# Patient Record
Sex: FEMALE | Race: WHITE | NOT HISPANIC OR LATINO | Employment: UNEMPLOYED
[De-identification: names, ages, dates, MRNs, and addresses within clinical notes are randomized per-mention and may not be internally consistent; named-entity substitution may affect disease eponyms.]

---

## 2017-10-12 ENCOUNTER — RECORDS - HEALTHEAST (OUTPATIENT)
Dept: ADMINISTRATIVE | Facility: OTHER | Age: 57
End: 2017-10-12

## 2018-01-16 ENCOUNTER — RECORDS - HEALTHEAST (OUTPATIENT)
Dept: ADMINISTRATIVE | Facility: OTHER | Age: 58
End: 2018-01-16

## 2019-01-11 ENCOUNTER — RECORDS - HEALTHEAST (OUTPATIENT)
Dept: ADMINISTRATIVE | Facility: OTHER | Age: 59
End: 2019-01-11

## 2019-06-27 ENCOUNTER — OFFICE VISIT - HEALTHEAST (OUTPATIENT)
Dept: FAMILY MEDICINE | Facility: CLINIC | Age: 59
End: 2019-06-27

## 2019-06-27 DIAGNOSIS — R05.9 COUGH: ICD-10-CM

## 2019-06-27 DIAGNOSIS — J01.90 ACUTE NON-RECURRENT SINUSITIS, UNSPECIFIED LOCATION: ICD-10-CM

## 2019-06-27 RX ORDER — GABAPENTIN 300 MG/1
300 CAPSULE ORAL
Status: SHIPPED | COMMUNITY
Start: 2019-03-14 | End: 2021-10-20

## 2019-06-27 RX ORDER — ALBUTEROL SULFATE 90 UG/1
AEROSOL, METERED RESPIRATORY (INHALATION)
Status: SHIPPED | COMMUNITY
Start: 2019-03-14 | End: 2022-10-07

## 2019-07-03 ENCOUNTER — OFFICE VISIT - HEALTHEAST (OUTPATIENT)
Dept: FAMILY MEDICINE | Facility: CLINIC | Age: 59
End: 2019-07-03

## 2019-07-03 DIAGNOSIS — Z91.09 ENVIRONMENTAL ALLERGIES: ICD-10-CM

## 2019-07-03 DIAGNOSIS — G25.81 RESTLESS LEG SYNDROME: ICD-10-CM

## 2019-07-03 DIAGNOSIS — F32.A ANXIETY AND DEPRESSION: ICD-10-CM

## 2019-07-03 DIAGNOSIS — Z76.89 ENCOUNTER TO ESTABLISH CARE: ICD-10-CM

## 2019-07-03 DIAGNOSIS — K29.50 CHRONIC GASTRITIS WITHOUT BLEEDING, UNSPECIFIED GASTRITIS TYPE: ICD-10-CM

## 2019-07-03 DIAGNOSIS — D68.62 LUPUS ANTICOAGULANT DISORDER (H): ICD-10-CM

## 2019-07-03 DIAGNOSIS — F41.9 ANXIETY AND DEPRESSION: ICD-10-CM

## 2019-07-03 ASSESSMENT — MIFFLIN-ST. JEOR: SCORE: 1164.17

## 2019-07-08 ENCOUNTER — COMMUNICATION - HEALTHEAST (OUTPATIENT)
Dept: ADMINISTRATIVE | Facility: HOSPITAL | Age: 59
End: 2019-07-08

## 2019-07-08 ENCOUNTER — COMMUNICATION - HEALTHEAST (OUTPATIENT)
Dept: ONCOLOGY | Facility: CLINIC | Age: 59
End: 2019-07-08

## 2019-07-26 ENCOUNTER — OFFICE VISIT - HEALTHEAST (OUTPATIENT)
Dept: ONCOLOGY | Facility: CLINIC | Age: 59
End: 2019-07-26

## 2019-07-26 ENCOUNTER — OFFICE VISIT - HEALTHEAST (OUTPATIENT)
Dept: ALLERGY | Facility: CLINIC | Age: 59
End: 2019-07-26

## 2019-07-26 DIAGNOSIS — J30.1 SEASONAL ALLERGIC RHINITIS DUE TO POLLEN: ICD-10-CM

## 2019-07-26 DIAGNOSIS — I82.402 DEEP VEIN THROMBOSIS (DVT) OF LEFT LOWER EXTREMITY, UNSPECIFIED CHRONICITY, UNSPECIFIED VEIN (H): ICD-10-CM

## 2019-07-26 DIAGNOSIS — L29.9 ITCHING: ICD-10-CM

## 2019-07-26 DIAGNOSIS — J30.81 ALLERGIC RHINITIS DUE TO DOG HAIR: ICD-10-CM

## 2019-07-26 DIAGNOSIS — R76.0 LUPUS ANTICOAGULANT POSITIVE: ICD-10-CM

## 2019-07-26 ASSESSMENT — MIFFLIN-ST. JEOR
SCORE: 1161.9
SCORE: 1159.63

## 2019-07-30 ENCOUNTER — COMMUNICATION - HEALTHEAST (OUTPATIENT)
Dept: ALLERGY | Facility: CLINIC | Age: 59
End: 2019-07-30

## 2019-07-30 LAB
A ALTERNATA IGE QN: <0.35 KU/L
A FUMIGATUS IGE QN: <0.35 KU/L
C HERBARUM IGE QN: <0.35 KU/L
CAT DANDER IGG QN: <0.35 KU/L
COCKSFOOT IGE QN: <0.35 KU/L
COMMON RAGWEED IGE QN: <0.35 KU/L
COTTONWOOD IGE QN: <0.35 KU/L
D FARINAE IGE QN: <0.35 KU/L
D PTERONYSS IGE QN: <0.35 KU/L
DOG DANDER+EPITH IGE QN: 2.64 KU/L
KENT BLUE GRASS IGE QN: <0.35 KU/L
MAPLE IGE QN: <0.35 KU/L
ROACH IGE QN: <0.35 KU/L
SILVER BIRCH IGE QN: <0.35 KU/L
TIMOTHY IGE QN: <0.35 KU/L
TOTAL IGE - HISTORICAL: 221 KU/L (ref 0–100)
WHITE ASH IGE QN: <0.35 KU/L
WHITE ELM IGE QN: <0.35 KU/L
WHITE OAK IGE QN: <0.35 KU/L

## 2019-08-02 ENCOUNTER — COMMUNICATION - HEALTHEAST (OUTPATIENT)
Dept: ALLERGY | Facility: CLINIC | Age: 59
End: 2019-08-02

## 2019-08-02 ENCOUNTER — COMMUNICATION - HEALTHEAST (OUTPATIENT)
Dept: ONCOLOGY | Facility: CLINIC | Age: 59
End: 2019-08-02

## 2019-08-02 DIAGNOSIS — I82.402 DEEP VEIN THROMBOSIS (DVT) OF LEFT LOWER EXTREMITY, UNSPECIFIED CHRONICITY, UNSPECIFIED VEIN (H): ICD-10-CM

## 2019-08-11 ENCOUNTER — OFFICE VISIT - HEALTHEAST (OUTPATIENT)
Dept: FAMILY MEDICINE | Facility: CLINIC | Age: 59
End: 2019-08-11

## 2019-08-11 DIAGNOSIS — N30.00 ACUTE CYSTITIS WITHOUT HEMATURIA: ICD-10-CM

## 2019-08-11 DIAGNOSIS — R39.9 UTI SYMPTOMS: ICD-10-CM

## 2019-08-11 LAB
ALBUMIN UR-MCNC: NEGATIVE MG/DL
APPEARANCE UR: ABNORMAL
BACTERIA #/AREA URNS HPF: ABNORMAL HPF
BILIRUB UR QL STRIP: NEGATIVE
COLOR UR AUTO: YELLOW
GLUCOSE UR STRIP-MCNC: NEGATIVE MG/DL
HGB UR QL STRIP: ABNORMAL
KETONES UR STRIP-MCNC: NEGATIVE MG/DL
LEUKOCYTE ESTERASE UR QL STRIP: ABNORMAL
NITRATE UR QL: NEGATIVE
PH UR STRIP: 6.5 [PH] (ref 5–8)
RBC #/AREA URNS AUTO: ABNORMAL HPF
SP GR UR STRIP: 1.02 (ref 1–1.03)
SQUAMOUS #/AREA URNS AUTO: ABNORMAL LPF
UROBILINOGEN UR STRIP-ACNC: ABNORMAL
WBC #/AREA URNS AUTO: ABNORMAL HPF

## 2019-08-13 ENCOUNTER — COMMUNICATION - HEALTHEAST (OUTPATIENT)
Dept: ALLERGY | Facility: CLINIC | Age: 59
End: 2019-08-13

## 2019-08-13 ENCOUNTER — COMMUNICATION - HEALTHEAST (OUTPATIENT)
Dept: FAMILY MEDICINE | Facility: CLINIC | Age: 59
End: 2019-08-13

## 2019-08-13 DIAGNOSIS — N30.00 ACUTE CYSTITIS WITHOUT HEMATURIA: ICD-10-CM

## 2019-08-13 LAB — BACTERIA SPEC CULT: ABNORMAL

## 2019-08-27 ENCOUNTER — COMMUNICATION - HEALTHEAST (OUTPATIENT)
Dept: ALLERGY | Facility: CLINIC | Age: 59
End: 2019-08-27

## 2019-08-30 ENCOUNTER — OFFICE VISIT - HEALTHEAST (OUTPATIENT)
Dept: FAMILY MEDICINE | Facility: CLINIC | Age: 59
End: 2019-08-30

## 2019-09-02 ENCOUNTER — AMBULATORY - HEALTHEAST (OUTPATIENT)
Dept: FAMILY MEDICINE | Facility: CLINIC | Age: 59
End: 2019-09-02

## 2019-09-02 DIAGNOSIS — N39.0 URINARY TRACT INFECTION WITHOUT HEMATURIA, SITE UNSPECIFIED: ICD-10-CM

## 2019-09-03 ENCOUNTER — AMBULATORY - HEALTHEAST (OUTPATIENT)
Dept: LAB | Facility: CLINIC | Age: 59
End: 2019-09-03

## 2019-09-03 DIAGNOSIS — N39.0 URINARY TRACT INFECTION WITHOUT HEMATURIA, SITE UNSPECIFIED: ICD-10-CM

## 2019-09-03 LAB
ALBUMIN UR-MCNC: NEGATIVE MG/DL
APPEARANCE UR: CLEAR
BACTERIA #/AREA URNS HPF: ABNORMAL HPF
BILIRUB UR QL STRIP: NEGATIVE
COLOR UR AUTO: YELLOW
GLUCOSE UR STRIP-MCNC: NEGATIVE MG/DL
HGB UR QL STRIP: NEGATIVE
KETONES UR STRIP-MCNC: NEGATIVE MG/DL
LEUKOCYTE ESTERASE UR QL STRIP: ABNORMAL
NITRATE UR QL: NEGATIVE
PH UR STRIP: 6.5 [PH] (ref 5–8)
RBC #/AREA URNS AUTO: ABNORMAL HPF
SP GR UR STRIP: 1.01 (ref 1–1.03)
SQUAMOUS #/AREA URNS AUTO: ABNORMAL LPF
UROBILINOGEN UR STRIP-ACNC: ABNORMAL
WBC #/AREA URNS AUTO: ABNORMAL HPF

## 2019-09-04 ENCOUNTER — HOSPITAL ENCOUNTER (OUTPATIENT)
Dept: ULTRASOUND IMAGING | Facility: CLINIC | Age: 59
Setting detail: RADIATION/ONCOLOGY SERIES
Discharge: STILL ADMITTED OR ADMINISTRATIVE | End: 2019-09-04
Attending: INTERNAL MEDICINE

## 2019-09-04 ENCOUNTER — AMBULATORY - HEALTHEAST (OUTPATIENT)
Dept: INFUSION THERAPY | Facility: CLINIC | Age: 59
End: 2019-09-04

## 2019-09-04 DIAGNOSIS — I82.402 DEEP VEIN THROMBOSIS (DVT) OF LEFT LOWER EXTREMITY, UNSPECIFIED CHRONICITY, UNSPECIFIED VEIN (H): ICD-10-CM

## 2019-09-04 DIAGNOSIS — R76.0 LUPUS ANTICOAGULANT POSITIVE: ICD-10-CM

## 2019-09-04 LAB
BACTERIA SPEC CULT: NO GROWTH
BASOPHILS # BLD AUTO: 0.1 THOU/UL (ref 0–0.2)
BASOPHILS NFR BLD AUTO: 1 % (ref 0–2)
D DIMER PPP FEU-MCNC: 0.32 FEU UG/ML
EOSINOPHIL # BLD AUTO: 0.3 THOU/UL (ref 0–0.4)
EOSINOPHIL NFR BLD AUTO: 2 % (ref 0–6)
ERYTHROCYTE [DISTWIDTH] IN BLOOD BY AUTOMATED COUNT: 13.5 % (ref 11–14.5)
HCT VFR BLD AUTO: 47.7 % (ref 35–47)
HGB BLD-MCNC: 15.5 G/DL (ref 12–16)
LYMPHOCYTES # BLD AUTO: 2.9 THOU/UL (ref 0.8–4.4)
LYMPHOCYTES NFR BLD AUTO: 28 % (ref 20–40)
MCH RBC QN AUTO: 33.1 PG (ref 27–34)
MCHC RBC AUTO-ENTMCNC: 32.5 G/DL (ref 32–36)
MCV RBC AUTO: 102 FL (ref 80–100)
MONOCYTES # BLD AUTO: 0.5 THOU/UL (ref 0–0.9)
MONOCYTES NFR BLD AUTO: 5 % (ref 2–10)
NEUTROPHILS # BLD AUTO: 6.6 THOU/UL (ref 2–7.7)
NEUTROPHILS NFR BLD AUTO: 64 % (ref 50–70)
PLATELET # BLD AUTO: 218 THOU/UL (ref 140–440)
PMV BLD AUTO: 11.9 FL (ref 8.5–12.5)
RBC # BLD AUTO: 4.68 MILL/UL (ref 3.8–5.4)
WBC: 10.3 THOU/UL (ref 4–11)

## 2019-09-06 LAB
CARDIOLIPIN IGG SER IA-ACNC: <1.6 GPL-U/ML (ref 0–19.9)
CARDIOLIPIN IGM SER IA-ACNC: 0.4 MPL-U/ML (ref 0–19.9)

## 2019-09-09 LAB — LA PPP-IMP: POSITIVE

## 2019-09-10 ENCOUNTER — OFFICE VISIT - HEALTHEAST (OUTPATIENT)
Dept: ONCOLOGY | Facility: CLINIC | Age: 59
End: 2019-09-10

## 2019-09-10 DIAGNOSIS — K22.2 ESOPHAGEAL STRICTURE: ICD-10-CM

## 2019-09-10 DIAGNOSIS — D68.62 LUPUS ANTICOAGULANT DISORDER (H): ICD-10-CM

## 2019-09-10 DIAGNOSIS — R14.0 BLOATING: ICD-10-CM

## 2019-09-10 DIAGNOSIS — Z71.6 ENCOUNTER FOR TOBACCO USE CESSATION COUNSELING: ICD-10-CM

## 2019-09-10 DIAGNOSIS — I82.402 DEEP VEIN THROMBOSIS (DVT) OF LEFT LOWER EXTREMITY, UNSPECIFIED CHRONICITY, UNSPECIFIED VEIN (H): ICD-10-CM

## 2019-09-13 ENCOUNTER — COMMUNICATION - HEALTHEAST (OUTPATIENT)
Dept: ONCOLOGY | Facility: CLINIC | Age: 59
End: 2019-09-13

## 2019-09-13 ENCOUNTER — RECORDS - HEALTHEAST (OUTPATIENT)
Dept: ADMINISTRATIVE | Facility: OTHER | Age: 59
End: 2019-09-13

## 2019-10-15 ENCOUNTER — RECORDS - HEALTHEAST (OUTPATIENT)
Dept: ADMINISTRATIVE | Facility: OTHER | Age: 59
End: 2019-10-15

## 2019-11-04 ENCOUNTER — COMMUNICATION - HEALTHEAST (OUTPATIENT)
Dept: FAMILY MEDICINE | Facility: CLINIC | Age: 59
End: 2019-11-04

## 2019-11-18 ENCOUNTER — OFFICE VISIT - HEALTHEAST (OUTPATIENT)
Dept: FAMILY MEDICINE | Facility: CLINIC | Age: 59
End: 2019-11-18

## 2019-11-18 ENCOUNTER — RECORDS - HEALTHEAST (OUTPATIENT)
Dept: GENERAL RADIOLOGY | Facility: CLINIC | Age: 59
End: 2019-11-18

## 2019-11-18 DIAGNOSIS — Z23 NEED FOR TETANUS, DIPHTHERIA, AND ACELLULAR PERTUSSIS (TDAP) VACCINE IN PATIENT OF ADOLESCENT AGE OR OLDER: ICD-10-CM

## 2019-11-18 DIAGNOSIS — M54.2 CERVICALGIA: ICD-10-CM

## 2019-11-18 DIAGNOSIS — Z71.84 TRAVEL ADVICE ENCOUNTER: ICD-10-CM

## 2019-11-18 DIAGNOSIS — Z12.31 VISIT FOR SCREENING MAMMOGRAM: ICD-10-CM

## 2019-11-18 DIAGNOSIS — M54.2 CERVICAL PAIN (NECK): ICD-10-CM

## 2019-11-21 ENCOUNTER — AMBULATORY - HEALTHEAST (OUTPATIENT)
Dept: FAMILY MEDICINE | Facility: CLINIC | Age: 59
End: 2019-11-21

## 2019-11-21 DIAGNOSIS — M54.2 CERVICAL PAIN (NECK): ICD-10-CM

## 2019-11-27 ENCOUNTER — HOSPITAL ENCOUNTER (OUTPATIENT)
Dept: PHYSICAL MEDICINE AND REHAB | Facility: CLINIC | Age: 59
Discharge: HOME | End: 2019-11-27
Attending: FAMILY MEDICINE

## 2019-11-27 DIAGNOSIS — Z98.890 HISTORY OF MANDIBULAR SURGERY: ICD-10-CM

## 2019-11-27 DIAGNOSIS — G89.29 CHRONIC JAW PAIN: ICD-10-CM

## 2019-11-27 DIAGNOSIS — M54.50 BILATERAL LOW BACK PAIN WITHOUT SCIATICA, UNSPECIFIED CHRONICITY: ICD-10-CM

## 2019-11-27 DIAGNOSIS — M54.12 RADICULITIS OF RIGHT CERVICAL REGION: ICD-10-CM

## 2019-11-27 DIAGNOSIS — G89.29 CHRONIC NECK PAIN: ICD-10-CM

## 2019-11-27 DIAGNOSIS — R68.84 CHRONIC JAW PAIN: ICD-10-CM

## 2019-11-27 DIAGNOSIS — M54.2 CHRONIC NECK PAIN: ICD-10-CM

## 2019-11-27 DIAGNOSIS — R26.89 BALANCE PROBLEMS: ICD-10-CM

## 2019-11-27 DIAGNOSIS — M43.12 SPONDYLOLISTHESIS OF CERVICAL REGION: ICD-10-CM

## 2019-11-27 ASSESSMENT — MIFFLIN-ST. JEOR: SCORE: 1173.24

## 2019-11-29 ENCOUNTER — OFFICE VISIT - HEALTHEAST (OUTPATIENT)
Dept: FAMILY MEDICINE | Facility: CLINIC | Age: 59
End: 2019-11-29

## 2019-11-29 DIAGNOSIS — R10.2 VAGINAL PAIN: ICD-10-CM

## 2019-11-29 DIAGNOSIS — R42 VERTIGO: ICD-10-CM

## 2019-12-02 ENCOUNTER — RECORDS - HEALTHEAST (OUTPATIENT)
Dept: ADMINISTRATIVE | Facility: OTHER | Age: 59
End: 2019-12-02

## 2019-12-02 ENCOUNTER — HOSPITAL ENCOUNTER (OUTPATIENT)
Dept: RADIOLOGY | Facility: CLINIC | Age: 59
Discharge: HOME | End: 2019-12-02

## 2019-12-02 ENCOUNTER — HOSPITAL ENCOUNTER (OUTPATIENT)
Dept: CT IMAGING | Facility: CLINIC | Age: 59
Discharge: HOME | End: 2019-12-02
Attending: FAMILY MEDICINE

## 2019-12-02 ENCOUNTER — HOSPITAL ENCOUNTER (OUTPATIENT)
Dept: MRI IMAGING | Facility: CLINIC | Age: 59
Discharge: HOME | End: 2019-12-02

## 2019-12-02 DIAGNOSIS — Z98.890 HISTORY OF MANDIBULAR SURGERY: ICD-10-CM

## 2019-12-02 DIAGNOSIS — R68.84 CHRONIC JAW PAIN: ICD-10-CM

## 2019-12-02 DIAGNOSIS — R26.89 BALANCE PROBLEMS: ICD-10-CM

## 2019-12-02 DIAGNOSIS — M43.12 SPONDYLOLISTHESIS OF CERVICAL REGION: ICD-10-CM

## 2019-12-02 DIAGNOSIS — G89.29 CHRONIC NECK PAIN: ICD-10-CM

## 2019-12-02 DIAGNOSIS — R42 VERTIGO: ICD-10-CM

## 2019-12-02 DIAGNOSIS — G89.29 CHRONIC JAW PAIN: ICD-10-CM

## 2019-12-02 DIAGNOSIS — M54.12 RADICULITIS OF RIGHT CERVICAL REGION: ICD-10-CM

## 2019-12-02 DIAGNOSIS — M54.50 BILATERAL LOW BACK PAIN WITHOUT SCIATICA, UNSPECIFIED CHRONICITY: ICD-10-CM

## 2019-12-02 DIAGNOSIS — M54.2 CHRONIC NECK PAIN: ICD-10-CM

## 2019-12-04 ENCOUNTER — COMMUNICATION - HEALTHEAST (OUTPATIENT)
Dept: PHYSICAL MEDICINE AND REHAB | Facility: CLINIC | Age: 59
End: 2019-12-04

## 2019-12-04 ENCOUNTER — AMBULATORY - HEALTHEAST (OUTPATIENT)
Dept: PHYSICAL MEDICINE AND REHAB | Facility: CLINIC | Age: 59
End: 2019-12-04

## 2019-12-04 DIAGNOSIS — G89.29 CHRONIC JAW PAIN: ICD-10-CM

## 2019-12-04 DIAGNOSIS — M54.2 CHRONIC NECK PAIN: ICD-10-CM

## 2019-12-04 DIAGNOSIS — M54.12 RADICULITIS OF RIGHT CERVICAL REGION: ICD-10-CM

## 2019-12-04 DIAGNOSIS — M48.02 FORAMINAL STENOSIS OF CERVICAL REGION: ICD-10-CM

## 2019-12-04 DIAGNOSIS — G89.29 CHRONIC NECK PAIN: ICD-10-CM

## 2019-12-04 DIAGNOSIS — R68.84 CHRONIC JAW PAIN: ICD-10-CM

## 2019-12-04 DIAGNOSIS — M54.50 BILATERAL LOW BACK PAIN WITHOUT SCIATICA, UNSPECIFIED CHRONICITY: ICD-10-CM

## 2019-12-04 DIAGNOSIS — M43.12 SPONDYLOLISTHESIS OF CERVICAL REGION: ICD-10-CM

## 2019-12-04 DIAGNOSIS — R26.89 BALANCE PROBLEMS: ICD-10-CM

## 2019-12-04 DIAGNOSIS — Z98.890 HISTORY OF MANDIBULAR SURGERY: ICD-10-CM

## 2019-12-09 ENCOUNTER — RECORDS - HEALTHEAST (OUTPATIENT)
Dept: ADMINISTRATIVE | Facility: OTHER | Age: 59
End: 2019-12-09

## 2019-12-13 ENCOUNTER — OFFICE VISIT - HEALTHEAST (OUTPATIENT)
Dept: PHYSICAL THERAPY | Facility: REHABILITATION | Age: 59
End: 2019-12-13

## 2019-12-13 DIAGNOSIS — M26.623 BILATERAL TEMPOROMANDIBULAR JOINT PAIN: ICD-10-CM

## 2019-12-13 DIAGNOSIS — G89.29 CHRONIC BILATERAL LOW BACK PAIN WITHOUT SCIATICA: ICD-10-CM

## 2019-12-13 DIAGNOSIS — M54.50 CHRONIC BILATERAL LOW BACK PAIN WITHOUT SCIATICA: ICD-10-CM

## 2019-12-13 DIAGNOSIS — M54.2 CERVICAL PAIN: ICD-10-CM

## 2019-12-13 DIAGNOSIS — M62.81 GENERALIZED MUSCLE WEAKNESS: ICD-10-CM

## 2019-12-16 ENCOUNTER — OFFICE VISIT - HEALTHEAST (OUTPATIENT)
Dept: PHYSICAL THERAPY | Facility: REHABILITATION | Age: 59
End: 2019-12-16

## 2019-12-16 DIAGNOSIS — M54.2 CERVICAL PAIN: ICD-10-CM

## 2019-12-16 DIAGNOSIS — M54.50 CHRONIC BILATERAL LOW BACK PAIN WITHOUT SCIATICA: ICD-10-CM

## 2019-12-16 DIAGNOSIS — M26.623 BILATERAL TEMPOROMANDIBULAR JOINT PAIN: ICD-10-CM

## 2019-12-16 DIAGNOSIS — G89.29 CHRONIC BILATERAL LOW BACK PAIN WITHOUT SCIATICA: ICD-10-CM

## 2019-12-16 DIAGNOSIS — M62.81 GENERALIZED MUSCLE WEAKNESS: ICD-10-CM

## 2019-12-17 ENCOUNTER — HOSPITAL ENCOUNTER (OUTPATIENT)
Dept: CT IMAGING | Facility: CLINIC | Age: 59
Discharge: HOME | End: 2019-12-17
Attending: INTERNAL MEDICINE

## 2019-12-17 DIAGNOSIS — K59.00 CONSTIPATION: ICD-10-CM

## 2019-12-17 LAB
CREAT BLD-MCNC: 0.9 MG/DL (ref 0.6–1.1)
GFR SERPL CREATININE-BSD FRML MDRD: >60 ML/MIN/1.73M2

## 2019-12-18 ENCOUNTER — OFFICE VISIT - HEALTHEAST (OUTPATIENT)
Dept: PHYSICAL THERAPY | Facility: REHABILITATION | Age: 59
End: 2019-12-18

## 2019-12-18 DIAGNOSIS — M26.623 BILATERAL TEMPOROMANDIBULAR JOINT PAIN: ICD-10-CM

## 2019-12-18 DIAGNOSIS — M54.2 CERVICAL PAIN: ICD-10-CM

## 2019-12-18 DIAGNOSIS — M62.81 GENERALIZED MUSCLE WEAKNESS: ICD-10-CM

## 2019-12-18 DIAGNOSIS — G89.29 CHRONIC BILATERAL LOW BACK PAIN WITHOUT SCIATICA: ICD-10-CM

## 2019-12-18 DIAGNOSIS — M54.50 CHRONIC BILATERAL LOW BACK PAIN WITHOUT SCIATICA: ICD-10-CM

## 2020-01-13 ENCOUNTER — OFFICE VISIT - HEALTHEAST (OUTPATIENT)
Dept: PHYSICAL THERAPY | Facility: REHABILITATION | Age: 60
End: 2020-01-13

## 2020-01-13 DIAGNOSIS — M54.50 CHRONIC BILATERAL LOW BACK PAIN WITHOUT SCIATICA: ICD-10-CM

## 2020-01-13 DIAGNOSIS — M62.81 GENERALIZED MUSCLE WEAKNESS: ICD-10-CM

## 2020-01-13 DIAGNOSIS — M54.2 CERVICAL PAIN: ICD-10-CM

## 2020-01-13 DIAGNOSIS — M26.623 BILATERAL TEMPOROMANDIBULAR JOINT PAIN: ICD-10-CM

## 2020-01-13 DIAGNOSIS — G89.29 CHRONIC BILATERAL LOW BACK PAIN WITHOUT SCIATICA: ICD-10-CM

## 2020-01-20 ENCOUNTER — OFFICE VISIT - HEALTHEAST (OUTPATIENT)
Dept: PHYSICAL THERAPY | Facility: REHABILITATION | Age: 60
End: 2020-01-20

## 2020-01-20 DIAGNOSIS — M54.50 CHRONIC BILATERAL LOW BACK PAIN WITHOUT SCIATICA: ICD-10-CM

## 2020-01-20 DIAGNOSIS — M62.81 GENERALIZED MUSCLE WEAKNESS: ICD-10-CM

## 2020-01-20 DIAGNOSIS — G89.29 CHRONIC BILATERAL LOW BACK PAIN WITHOUT SCIATICA: ICD-10-CM

## 2020-01-20 DIAGNOSIS — M26.623 BILATERAL TEMPOROMANDIBULAR JOINT PAIN: ICD-10-CM

## 2020-01-20 DIAGNOSIS — M54.2 CERVICAL PAIN: ICD-10-CM

## 2020-01-22 ENCOUNTER — COMMUNICATION - HEALTHEAST (OUTPATIENT)
Dept: ONCOLOGY | Facility: CLINIC | Age: 60
End: 2020-01-22

## 2020-01-24 ENCOUNTER — OFFICE VISIT - HEALTHEAST (OUTPATIENT)
Dept: PHYSICAL THERAPY | Facility: REHABILITATION | Age: 60
End: 2020-01-24

## 2020-01-24 DIAGNOSIS — M62.81 GENERALIZED MUSCLE WEAKNESS: ICD-10-CM

## 2020-01-24 DIAGNOSIS — M54.2 CERVICAL PAIN: ICD-10-CM

## 2020-01-24 DIAGNOSIS — M26.623 BILATERAL TEMPOROMANDIBULAR JOINT PAIN: ICD-10-CM

## 2020-01-24 DIAGNOSIS — G89.29 CHRONIC BILATERAL LOW BACK PAIN WITHOUT SCIATICA: ICD-10-CM

## 2020-01-24 DIAGNOSIS — M54.50 CHRONIC BILATERAL LOW BACK PAIN WITHOUT SCIATICA: ICD-10-CM

## 2020-01-31 ENCOUNTER — HOSPITAL ENCOUNTER (OUTPATIENT)
Dept: MAMMOGRAPHY | Facility: CLINIC | Age: 60
Discharge: HOME | End: 2020-01-31
Attending: FAMILY MEDICINE

## 2020-01-31 DIAGNOSIS — Z12.31 VISIT FOR SCREENING MAMMOGRAM: ICD-10-CM

## 2020-03-09 ENCOUNTER — RECORDS - HEALTHEAST (OUTPATIENT)
Dept: ADMINISTRATIVE | Facility: OTHER | Age: 60
End: 2020-03-09

## 2020-06-16 ENCOUNTER — COMMUNICATION - HEALTHEAST (OUTPATIENT)
Dept: FAMILY MEDICINE | Facility: CLINIC | Age: 60
End: 2020-06-16

## 2020-06-16 DIAGNOSIS — M54.2 CERVICAL PAIN (NECK): ICD-10-CM

## 2020-06-24 ENCOUNTER — COMMUNICATION - HEALTHEAST (OUTPATIENT)
Dept: SCHEDULING | Facility: CLINIC | Age: 60
End: 2020-06-24

## 2020-06-24 ENCOUNTER — OFFICE VISIT - HEALTHEAST (OUTPATIENT)
Dept: FAMILY MEDICINE | Facility: CLINIC | Age: 60
End: 2020-06-24

## 2020-06-24 DIAGNOSIS — L29.9 ITCHING: ICD-10-CM

## 2020-07-16 ENCOUNTER — VIRTUAL VISIT (OUTPATIENT)
Dept: FAMILY MEDICINE | Facility: OTHER | Age: 60
End: 2020-07-16

## 2020-07-17 ENCOUNTER — AMBULATORY - HEALTHEAST (OUTPATIENT)
Dept: FAMILY MEDICINE | Facility: CLINIC | Age: 60
End: 2020-07-17

## 2020-07-17 DIAGNOSIS — Z20.822 SUSPECTED COVID-19 VIRUS INFECTION: ICD-10-CM

## 2020-07-19 NOTE — PROGRESS NOTES
"Date: 2020 08:53:03  Clinician: Keyla Jennings  Clinician NPI: [id number]  Patient: Trenton Milian  Patient : 1960  [place] [address] [id number]  Patient Phone: [phone]  Visit Protocol: URI  Patient Summary:  Trenton is a 60 year old ( : 1960 ) female who initiated a Visit for COVID-19 (Coronavirus) evaluation and screening. When asked the question \"Please sign me up to receive news, health information and promotions. \", Trenton responded \"No\".   A synchronous visit is necessary because the patient reported the following abnormal symptoms:   Recent facial or sinus surgery (requires clarification)   Trenton states her symptoms started gradually 5-6 days ago.   Her symptoms consist of rhinitis, malaise, a cough, and nasal congestion. She is experiencing mild difficulty breathing with activities but can speak normally in full sentences.   Symptom details     Nasal secretions: The color of her mucus is clear.    Cough: Trenton coughs a few times an hour and her cough is not more bothersome at night. Phlegm comes into her throat when she coughs. She does not believe her cough is caused by post-nasal drip. The color of the phlegm is clear.      Trenton denies having wheezing, nausea, teeth pain, ageusia, diarrhea, vomiting, ear pain, headache, chills, sore throat, enlarged lymph nodes, myalgias, anosmia, facial pain or pressure, and fever. She also denies taking antibiotic medication in the past month and double sickening (worsening symptoms after initial improvement).   Precipitating events  She has not recently been exposed to someone with influenza. Trenton has been in close contact with the following high risk individuals: people with asthma, heart disease or diabetes and adults 65 or older.   Pertinent COVID-19 (Coronavirus) information  In the past 14 days, Trenton has not worked in a congregate living setting.   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 She does not provide direct patient care. Additional job details as reported by the patient (free text): [employer] ,   Trenton also has not lived in a congregate living setting in the past 14 days. She does not live with a healthcare worker.   Trenton has not had a close contact with a laboratory-confirmed COVID-19 patient within 14 days of symptom onset.   Pertinent medical history  Trenton does not get yeast infections when she takes antibiotics.   Trenton does not need a return to work/school note.   Weight: 138 lbs   Trenton smokes or uses smokeless tobacco.   Weight: 138 lbs    MEDICATIONS: Zyrtec oral, Zoloft oral, Xarelto oral, Prilosec oral, ALLERGIES: Penicillins, Augmentin, tramadol, Septra, fentanyl, Zyban  Clinician Response:  Dear Trenton,   Your symptoms show that you may have coronavirus (COVID-19). This illness can cause fever, cough and trouble breathing. Many people get a mild case and get better on their own. Some people can get very sick.  What should I do?  We would like to test you for this virus.   1. Please call [phone] to schedule your visit. Explain that you were referred by [facility] to have a COVID-19 test. Be ready to share your [facility] visit ID number.  The following will serve as your written order for this COVID Test, ordered by me, for the indication of suspected COVID [Z20.828]: The test will be ordered in [employer], our electronic health record, after you are scheduled. It will show as ordered and authorized by Willian Vazquez MD.  Order: COVID-19 (Coronavirus) PCR for SYMPTOMATIC testing from [facility].      2. When it's time for your COVID test:  Stay at least 6 feet away from others. (If someone will drive you to your test, stay in the backseat, as far away from the  as you can.)   Cover your mouth and nose with a mask, tissue or washcloth.  Go straight to the testing site.  "Don't make any stops on the way there or back.      3.Starting now: Stay home and away from others (self-isolate) until:   You've had no fever---and no medicine that reduces fever---for 3 full days (72 hours). And...   Your other symptoms have gotten better. For example, your cough or breathing has improved. And...   At least 10 days have passed since your symptoms started.       During this time, don't leave the house except for testing or medical care.   Stay in your own room, even for meals. Use your own bathroom if you can.   Stay away from others in your home. No hugging, kissing or shaking hands. No visitors.  Don't go to work, school or anywhere else.    Clean \"high touch\" surfaces often (doorknobs, counters, handles, etc.). Use a household cleaning spray or wipes. You'll find a full list of  on the EPA website: www.epa.gov/pesticide-registration/list-n-disinfectants-use-against-sars-cov-2.   Cover your mouth and nose with a mask, tissue or washcloth to avoid spreading germs.  Wash your hands and face often. Use soap and water.  Caregivers in these groups are at risk for severe illness due to COVID-19:  o People 65 years and older  o People who live in a nursing home or long-term care facility  o People with chronic disease (lung, heart, cancer, diabetes, kidney, liver, immunologic)  o People who have a weakened immune system, including those who:   Are in cancer treatment  Take medicine that weakens the immune system, such as corticosteroids  Had a bone marrow or organ transplant  Have an immune deficiency  Have poorly controlled HIV or AIDS  Are obese (body mass index of 40 or higher)  Smoke regularly   o Caregivers should wear gloves while washing dishes, handling laundry and cleaning bedrooms and bathrooms.  o Use caution when washing and drying laundry: Don't shake dirty laundry, and use the warmest water setting that you can.  o For more tips, go to " www.cdc.gov/coronavirus/2019-ncov/downloads/10Things.pdf.       How can I take care of myself?   Get lots of rest. Drink extra fluids (unless a doctor has told you not to).   Take Tylenol (acetaminophen) for fever or pain. If you have liver or kidney problems, ask your family doctor if it's okay to take Tylenol.   Adults can take either:    650 mg (two 325 mg pills) every 4 to 6 hours, or...   1,000 mg (two 500 mg pills) every 8 hours as needed.    Note: Don't take more than 3,000 mg in one day. Acetaminophen is found in many medicines (both prescribed and over-the-counter medicines). Read all labels to be sure you don't take too much.   For children, check the Tylenol bottle for the right dose. The dose is based on the child's age or weight.    If you have other health problems (like cancer, heart failure, an organ transplant or severe kidney disease): Call your specialty clinic if you don't feel better in the next 2 days.       Know when to call 911. Emergency warning signs include:    Trouble breathing or shortness of breath Pain or pressure in the chest that doesn't go away Feeling confused like you haven't felt before, or not being able to wake up Bluish-colored lips or face.  Where can I get more information?   [facility] -- About COVID-19: [email or web address]   CDC -- What to Do If You're Sick: www.cdc.gov/coronavirus/2019-ncov/about/steps-when-sick.html   CDC -- Ending Home Isolation: www.cdc.gov/coronavirus/2019-ncov/hcp/disposition-in-home-patients.html   CDC -- Caring for Someone: www.cdc.gov/coronavirus/2019-ncov/if-you-are-sick/care-for-someone.html   [facility] -- Interim Guidance for Hospital Discharge to Home: www.health.[facility].[place]/diseases/coronavirus/hcp/hospdischarge.pdf   [facility] clinical trials (COVID-19 research studies): clinicalaffairs.[facility]-clinical-trials    Below are the COVID-19 hotlines at the [place] Department of Health ([facility]). Interpreters are  available.    For health questions: Call 122-483-6863 or 1-860.810.4576 (7 a.m. to 7 p.m.) For questions about schools and childcare: Call 705-254-9589 or 1-669.859.1912 (7 a.m. to 7 p.m.)    Diagnosis: Cough  Diagnosis ICD: R05  Triage Notes: I reviewed the patient's history, verified their identity, and explained the Visit process.    called pt.  had a tooth implant done with root canal done over a month ago.  had a bit of slow recovery from that, but reports has recovered.  she also indicates upcoming travel.  does travel a lot with her job.  recently travelled to [facility] through [place].  Synchronous Triage: phone, status: completed, duration: 384 seconds

## 2020-07-21 ENCOUNTER — COMMUNICATION - HEALTHEAST (OUTPATIENT)
Dept: FAMILY MEDICINE | Facility: CLINIC | Age: 60
End: 2020-07-21

## 2020-07-21 DIAGNOSIS — M54.2 CERVICAL PAIN (NECK): ICD-10-CM

## 2020-07-23 ENCOUNTER — COMMUNICATION - HEALTHEAST (OUTPATIENT)
Dept: FAMILY MEDICINE | Facility: CLINIC | Age: 60
End: 2020-07-23

## 2020-09-15 ENCOUNTER — OFFICE VISIT - HEALTHEAST (OUTPATIENT)
Dept: FAMILY MEDICINE | Facility: CLINIC | Age: 60
End: 2020-09-15

## 2020-09-15 DIAGNOSIS — F32.A ANXIETY AND DEPRESSION: ICD-10-CM

## 2020-09-15 DIAGNOSIS — M79.10 MUSCLE PAIN: ICD-10-CM

## 2020-09-15 DIAGNOSIS — Z11.59 NEED FOR HEPATITIS C SCREENING TEST: ICD-10-CM

## 2020-09-15 DIAGNOSIS — Z23 NEED FOR INFLUENZA VACCINATION: ICD-10-CM

## 2020-09-15 DIAGNOSIS — F41.9 ANXIETY AND DEPRESSION: ICD-10-CM

## 2020-09-15 DIAGNOSIS — E55.9 VITAMIN D DEFICIENCY: ICD-10-CM

## 2020-09-15 DIAGNOSIS — K29.50 CHRONIC GASTRITIS WITHOUT BLEEDING, UNSPECIFIED GASTRITIS TYPE: ICD-10-CM

## 2020-09-15 DIAGNOSIS — Z91.09 ENVIRONMENTAL ALLERGIES: ICD-10-CM

## 2020-09-15 DIAGNOSIS — Z00.00 ROUTINE GENERAL MEDICAL EXAMINATION AT A HEALTH CARE FACILITY: ICD-10-CM

## 2020-09-15 DIAGNOSIS — Z23 NEED FOR PNEUMOCOCCAL VACCINATION: ICD-10-CM

## 2020-09-15 LAB
ALBUMIN SERPL-MCNC: 4.1 G/DL (ref 3.5–5)
ALP SERPL-CCNC: 149 U/L (ref 45–120)
ALT SERPL W P-5'-P-CCNC: 38 U/L (ref 0–45)
ANION GAP SERPL CALCULATED.3IONS-SCNC: 12 MMOL/L (ref 5–18)
AST SERPL W P-5'-P-CCNC: 30 U/L (ref 0–40)
BILIRUB SERPL-MCNC: 0.3 MG/DL (ref 0–1)
BUN SERPL-MCNC: 17 MG/DL (ref 8–22)
CALCIUM SERPL-MCNC: 9.7 MG/DL (ref 8.5–10.5)
CHLORIDE BLD-SCNC: 106 MMOL/L (ref 98–107)
CHOLEST SERPL-MCNC: 295 MG/DL
CO2 SERPL-SCNC: 24 MMOL/L (ref 22–31)
CREAT SERPL-MCNC: 0.9 MG/DL (ref 0.6–1.1)
ERYTHROCYTE [DISTWIDTH] IN BLOOD BY AUTOMATED COUNT: 11.2 % (ref 11–14.5)
FASTING STATUS PATIENT QL REPORTED: YES
GFR SERPL CREATININE-BSD FRML MDRD: >60 ML/MIN/1.73M2
GLUCOSE BLD-MCNC: 100 MG/DL (ref 70–125)
HCT VFR BLD AUTO: 48.9 % (ref 35–47)
HDLC SERPL-MCNC: 60 MG/DL
HGB BLD-MCNC: 16.3 G/DL (ref 12–16)
LDLC SERPL CALC-MCNC: 207 MG/DL
MCH RBC QN AUTO: 33.6 PG (ref 27–34)
MCHC RBC AUTO-ENTMCNC: 33.3 G/DL (ref 32–36)
MCV RBC AUTO: 101 FL (ref 80–100)
PLATELET # BLD AUTO: 255 THOU/UL (ref 140–440)
PMV BLD AUTO: 9.6 FL (ref 7–10)
POTASSIUM BLD-SCNC: 5.1 MMOL/L (ref 3.5–5)
PROT SERPL-MCNC: 7.3 G/DL (ref 6–8)
RBC # BLD AUTO: 4.85 MILL/UL (ref 3.8–5.4)
SODIUM SERPL-SCNC: 142 MMOL/L (ref 136–145)
TRIGL SERPL-MCNC: 140 MG/DL
WBC: 8 THOU/UL (ref 4–11)

## 2020-09-15 ASSESSMENT — MIFFLIN-ST. JEOR: SCORE: 1174.82

## 2020-09-16 LAB
25(OH)D3 SERPL-MCNC: 33.7 NG/ML (ref 30–80)
25(OH)D3 SERPL-MCNC: 33.7 NG/ML (ref 30–80)
HCV AB SERPL QL IA: NEGATIVE

## 2020-09-21 ENCOUNTER — OFFICE VISIT - HEALTHEAST (OUTPATIENT)
Dept: FAMILY MEDICINE | Facility: CLINIC | Age: 60
End: 2020-09-21

## 2020-09-21 DIAGNOSIS — N90.89 PERINEAL CYST IN FEMALE: ICD-10-CM

## 2020-09-28 ENCOUNTER — AMBULATORY - HEALTHEAST (OUTPATIENT)
Dept: FAMILY MEDICINE | Facility: CLINIC | Age: 60
End: 2020-09-28

## 2020-10-05 ENCOUNTER — OFFICE VISIT - HEALTHEAST (OUTPATIENT)
Dept: FAMILY MEDICINE | Facility: CLINIC | Age: 60
End: 2020-10-05

## 2020-10-05 ENCOUNTER — COMMUNICATION - HEALTHEAST (OUTPATIENT)
Dept: SCHEDULING | Facility: CLINIC | Age: 60
End: 2020-10-05

## 2020-10-05 DIAGNOSIS — L50.9 HIVES: ICD-10-CM

## 2020-10-05 ASSESSMENT — MIFFLIN-ST. JEOR: SCORE: 1002.23

## 2020-10-19 ENCOUNTER — RECORDS - HEALTHEAST (OUTPATIENT)
Dept: ADMINISTRATIVE | Facility: OTHER | Age: 60
End: 2020-10-19

## 2020-12-01 ENCOUNTER — OFFICE VISIT - HEALTHEAST (OUTPATIENT)
Dept: FAMILY MEDICINE | Facility: CLINIC | Age: 60
End: 2020-12-01

## 2020-12-01 DIAGNOSIS — N30.01 ACUTE CYSTITIS WITH HEMATURIA: ICD-10-CM

## 2020-12-01 LAB
ALBUMIN UR-MCNC: NEGATIVE MG/DL
APPEARANCE UR: CLEAR
BACTERIA #/AREA URNS HPF: ABNORMAL HPF
BILIRUB UR QL STRIP: NEGATIVE
COLOR UR AUTO: YELLOW
GLUCOSE UR STRIP-MCNC: NEGATIVE MG/DL
HGB UR QL STRIP: ABNORMAL
KETONES UR STRIP-MCNC: NEGATIVE MG/DL
LEUKOCYTE ESTERASE UR QL STRIP: ABNORMAL
NITRATE UR QL: NEGATIVE
PH UR STRIP: 5.5 [PH] (ref 5–8)
RBC #/AREA URNS AUTO: ABNORMAL HPF
SP GR UR STRIP: 1.02 (ref 1–1.03)
SQUAMOUS #/AREA URNS AUTO: ABNORMAL LPF
TRANS CELLS #/AREA URNS HPF: ABNORMAL LPF
UROBILINOGEN UR STRIP-ACNC: ABNORMAL
WBC #/AREA URNS AUTO: >100 HPF

## 2020-12-01 ASSESSMENT — MIFFLIN-ST. JEOR: SCORE: 1180.71

## 2020-12-03 ENCOUNTER — COMMUNICATION - HEALTHEAST (OUTPATIENT)
Dept: FAMILY MEDICINE | Facility: CLINIC | Age: 60
End: 2020-12-03

## 2020-12-03 LAB — BACTERIA SPEC CULT: ABNORMAL

## 2020-12-18 ENCOUNTER — OFFICE VISIT - HEALTHEAST (OUTPATIENT)
Dept: FAMILY MEDICINE | Facility: CLINIC | Age: 60
End: 2020-12-18

## 2020-12-18 DIAGNOSIS — N39.0 URINARY TRACT INFECTION: ICD-10-CM

## 2020-12-18 LAB
ALBUMIN UR-MCNC: ABNORMAL MG/DL
APPEARANCE UR: ABNORMAL
BACTERIA #/AREA URNS HPF: ABNORMAL HPF
BILIRUB UR QL STRIP: NEGATIVE
COLOR UR AUTO: YELLOW
GLUCOSE UR STRIP-MCNC: NEGATIVE MG/DL
HGB UR QL STRIP: ABNORMAL
KETONES UR STRIP-MCNC: NEGATIVE MG/DL
LEUKOCYTE ESTERASE UR QL STRIP: ABNORMAL
NITRATE UR QL: NEGATIVE
PH UR STRIP: 6 [PH] (ref 5–8)
RBC #/AREA URNS AUTO: ABNORMAL HPF
SP GR UR STRIP: 1.02 (ref 1–1.03)
SQUAMOUS #/AREA URNS AUTO: ABNORMAL LPF
UROBILINOGEN UR STRIP-ACNC: ABNORMAL
WBC #/AREA URNS AUTO: >100 HPF

## 2020-12-20 LAB — BACTERIA SPEC CULT: ABNORMAL

## 2021-02-01 ENCOUNTER — AMBULATORY - HEALTHEAST (OUTPATIENT)
Dept: NURSING | Facility: CLINIC | Age: 61
End: 2021-02-01

## 2021-02-22 ENCOUNTER — AMBULATORY - HEALTHEAST (OUTPATIENT)
Dept: NURSING | Facility: CLINIC | Age: 61
End: 2021-02-22

## 2021-03-02 ENCOUNTER — OFFICE VISIT - HEALTHEAST (OUTPATIENT)
Dept: FAMILY MEDICINE | Facility: CLINIC | Age: 61
End: 2021-03-02

## 2021-03-02 ENCOUNTER — RECORDS - HEALTHEAST (OUTPATIENT)
Dept: GENERAL RADIOLOGY | Facility: CLINIC | Age: 61
End: 2021-03-02

## 2021-03-02 DIAGNOSIS — M54.2 CERVICAL PAIN (NECK): ICD-10-CM

## 2021-03-02 DIAGNOSIS — M54.41 RIGHT-SIDED LOW BACK PAIN WITH RIGHT-SIDED SCIATICA, UNSPECIFIED CHRONICITY: ICD-10-CM

## 2021-03-02 DIAGNOSIS — Z71.84 TRAVEL ADVICE ENCOUNTER: ICD-10-CM

## 2021-03-02 DIAGNOSIS — M54.41 LUMBAGO WITH SCIATICA, RIGHT SIDE: ICD-10-CM

## 2021-03-02 DIAGNOSIS — D68.62 LUPUS ANTICOAGULANT DISORDER (H): ICD-10-CM

## 2021-03-02 DIAGNOSIS — Z91.09 ENVIRONMENTAL ALLERGIES: ICD-10-CM

## 2021-03-02 DIAGNOSIS — K29.50 CHRONIC GASTRITIS WITHOUT BLEEDING, UNSPECIFIED GASTRITIS TYPE: ICD-10-CM

## 2021-03-02 RX ORDER — RIVAROXABAN 20 MG/1
20 TABLET, FILM COATED ORAL DAILY
Qty: 90 TABLET | Refills: 2 | Status: SHIPPED | OUTPATIENT
Start: 2021-03-02 | End: 2021-11-23

## 2021-03-02 RX ORDER — CETIRIZINE HYDROCHLORIDE 10 MG/1
10 TABLET ORAL DAILY
Qty: 90 TABLET | Refills: 1 | Status: SHIPPED | OUTPATIENT
Start: 2021-03-02 | End: 2021-09-16

## 2021-03-02 ASSESSMENT — ANXIETY QUESTIONNAIRES
GAD7 TOTAL SCORE: 2
3. WORRYING TOO MUCH ABOUT DIFFERENT THINGS: NOT AT ALL
5. BEING SO RESTLESS THAT IT IS HARD TO SIT STILL: SEVERAL DAYS
4. TROUBLE RELAXING: SEVERAL DAYS
1. FEELING NERVOUS, ANXIOUS, OR ON EDGE: NOT AT ALL
7. FEELING AFRAID AS IF SOMETHING AWFUL MIGHT HAPPEN: NOT AT ALL
IF YOU CHECKED OFF ANY PROBLEMS ON THIS QUESTIONNAIRE, HOW DIFFICULT HAVE THESE PROBLEMS MADE IT FOR YOU TO DO YOUR WORK, TAKE CARE OF THINGS AT HOME, OR GET ALONG WITH OTHER PEOPLE: NOT DIFFICULT AT ALL
6. BECOMING EASILY ANNOYED OR IRRITABLE: NOT AT ALL
2. NOT BEING ABLE TO STOP OR CONTROL WORRYING: NOT AT ALL

## 2021-03-02 ASSESSMENT — MIFFLIN-ST. JEOR: SCORE: 1184.91

## 2021-03-02 ASSESSMENT — PATIENT HEALTH QUESTIONNAIRE - PHQ9: SUM OF ALL RESPONSES TO PHQ QUESTIONS 1-9: 0

## 2021-03-03 ENCOUNTER — AMBULATORY - HEALTHEAST (OUTPATIENT)
Dept: FAMILY MEDICINE | Facility: CLINIC | Age: 61
End: 2021-03-03

## 2021-03-03 DIAGNOSIS — M54.41 RIGHT-SIDED LOW BACK PAIN WITH RIGHT-SIDED SCIATICA, UNSPECIFIED CHRONICITY: ICD-10-CM

## 2021-03-03 LAB
SARS-COV-2 PCR COMMENT: NORMAL
SARS-COV-2 RNA SPEC QL NAA+PROBE: NEGATIVE
SARS-COV-2 VIRUS SPECIMEN SOURCE: NORMAL

## 2021-03-04 ENCOUNTER — COMMUNICATION - HEALTHEAST (OUTPATIENT)
Dept: SCHEDULING | Facility: CLINIC | Age: 61
End: 2021-03-04

## 2021-03-06 ENCOUNTER — COMMUNICATION - HEALTHEAST (OUTPATIENT)
Dept: SCHEDULING | Facility: CLINIC | Age: 61
End: 2021-03-06

## 2021-03-15 ENCOUNTER — HOSPITAL ENCOUNTER (OUTPATIENT)
Dept: CT IMAGING | Facility: CLINIC | Age: 61
Discharge: HOME | End: 2021-03-15
Attending: FAMILY MEDICINE

## 2021-03-15 ENCOUNTER — OFFICE VISIT - HEALTHEAST (OUTPATIENT)
Dept: FAMILY MEDICINE | Facility: CLINIC | Age: 61
End: 2021-03-15

## 2021-03-15 DIAGNOSIS — L03.90 CELLULITIS, UNSPECIFIED CELLULITIS SITE: ICD-10-CM

## 2021-03-15 DIAGNOSIS — M54.41 RIGHT-SIDED LOW BACK PAIN WITH RIGHT-SIDED SCIATICA, UNSPECIFIED CHRONICITY: ICD-10-CM

## 2021-03-15 RX ORDER — LINACLOTIDE 72 UG/1
CAPSULE, GELATIN COATED ORAL
Status: SHIPPED | COMMUNITY
Start: 2021-01-04 | End: 2021-10-20

## 2021-04-05 ENCOUNTER — OFFICE VISIT - HEALTHEAST (OUTPATIENT)
Dept: FAMILY MEDICINE | Facility: CLINIC | Age: 61
End: 2021-04-05

## 2021-04-05 DIAGNOSIS — R30.0 BURNING WITH URINATION: ICD-10-CM

## 2021-04-05 DIAGNOSIS — N30.01 ACUTE CYSTITIS WITH HEMATURIA: ICD-10-CM

## 2021-04-05 LAB
ALBUMIN UR-MCNC: NEGATIVE G/DL
APPEARANCE UR: CLEAR
BACTERIA #/AREA URNS HPF: ABNORMAL /[HPF]
BILIRUB UR QL STRIP: NEGATIVE
COLOR UR AUTO: YELLOW
GLUCOSE UR STRIP-MCNC: NEGATIVE MG/DL
HGB UR QL STRIP: ABNORMAL
KETONES UR STRIP-MCNC: NEGATIVE MG/DL
LEUKOCYTE ESTERASE UR QL STRIP: ABNORMAL
NITRATE UR QL: NEGATIVE
PH UR STRIP: 7 [PH] (ref 5–8)
RBC #/AREA URNS AUTO: ABNORMAL HPF
SP GR UR STRIP: 1.02 (ref 1–1.03)
SQUAMOUS #/AREA URNS AUTO: ABNORMAL LPF
UROBILINOGEN UR STRIP-ACNC: ABNORMAL
WBC #/AREA URNS AUTO: >100 HPF

## 2021-04-07 ENCOUNTER — OFFICE VISIT - HEALTHEAST (OUTPATIENT)
Dept: FAMILY MEDICINE | Facility: CLINIC | Age: 61
End: 2021-04-07

## 2021-04-07 ENCOUNTER — COMMUNICATION - HEALTHEAST (OUTPATIENT)
Dept: FAMILY MEDICINE | Facility: CLINIC | Age: 61
End: 2021-04-07

## 2021-04-07 ENCOUNTER — AMBULATORY - HEALTHEAST (OUTPATIENT)
Dept: LAB | Facility: CLINIC | Age: 61
End: 2021-04-07

## 2021-04-07 DIAGNOSIS — Z11.52 ENCOUNTER FOR SCREENING FOR COVID-19: ICD-10-CM

## 2021-04-07 LAB — BACTERIA SPEC CULT: ABNORMAL

## 2021-04-09 ENCOUNTER — COMMUNICATION - HEALTHEAST (OUTPATIENT)
Dept: SCHEDULING | Facility: CLINIC | Age: 61
End: 2021-04-09

## 2021-04-12 ENCOUNTER — COMMUNICATION - HEALTHEAST (OUTPATIENT)
Dept: FAMILY MEDICINE | Facility: CLINIC | Age: 61
End: 2021-04-12

## 2021-04-12 DIAGNOSIS — Z11.52 ENCOUNTER FOR SCREENING FOR COVID-19: ICD-10-CM

## 2021-04-16 ENCOUNTER — COMMUNICATION - HEALTHEAST (OUTPATIENT)
Dept: FAMILY MEDICINE | Facility: CLINIC | Age: 61
End: 2021-04-16

## 2021-04-18 ENCOUNTER — AMBULATORY - HEALTHEAST (OUTPATIENT)
Dept: FAMILY MEDICINE | Facility: CLINIC | Age: 61
End: 2021-04-18

## 2021-04-18 DIAGNOSIS — Z11.52 ENCOUNTER FOR SCREENING FOR COVID-19: ICD-10-CM

## 2021-04-20 ENCOUNTER — COMMUNICATION - HEALTHEAST (OUTPATIENT)
Dept: SCHEDULING | Facility: CLINIC | Age: 61
End: 2021-04-20

## 2021-05-11 ENCOUNTER — COMMUNICATION - HEALTHEAST (OUTPATIENT)
Dept: FAMILY MEDICINE | Facility: CLINIC | Age: 61
End: 2021-05-11

## 2021-05-11 DIAGNOSIS — Z11.52 ENCOUNTER FOR SCREENING FOR COVID-19: ICD-10-CM

## 2021-05-18 ENCOUNTER — COMMUNICATION - HEALTHEAST (OUTPATIENT)
Dept: FAMILY MEDICINE | Facility: CLINIC | Age: 61
End: 2021-05-18

## 2021-05-19 ENCOUNTER — AMBULATORY - HEALTHEAST (OUTPATIENT)
Dept: FAMILY MEDICINE | Facility: CLINIC | Age: 61
End: 2021-05-19

## 2021-05-19 DIAGNOSIS — G47.00 INSOMNIA, UNSPECIFIED TYPE: ICD-10-CM

## 2021-05-19 DIAGNOSIS — G25.81 RESTLESS LEG SYNDROME: ICD-10-CM

## 2021-05-27 ASSESSMENT — PATIENT HEALTH QUESTIONNAIRE - PHQ9: SUM OF ALL RESPONSES TO PHQ QUESTIONS 1-9: 0

## 2021-05-28 ASSESSMENT — ANXIETY QUESTIONNAIRES: GAD7 TOTAL SCORE: 2

## 2021-05-30 NOTE — PROGRESS NOTES
Chief complaint: Establish allergy care    History of present illness: This is a 59-year-old woman I was asked to see by Dr. Layton for evaluation of allergies.  She states she had a lifelong history of allergies.  She states her symptoms consist of itchy eyes, sneezing and drainage.  She has repeated sinus infections as well.  She is been on allergy shots before but her  was in the .  For this reason, they moved multiple times.  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  She is wanting to restart allergy shots.  She states she was positive to multiple aeroallergens in [place] where she received her allergy shots.  She has had sinus surgery.  She had this done in 1994.  She reports she takes Zyrtec daily.  She has a lot of itching.  She takes the Zyrtec and it seems to help but she will have some breakthrough symptoms.  She was told she had a history of asthma previously.  Recently she was told she did not have asthma.  She did need her rescue inhaler this morning, however.  She does not use nasal sprays or rinses.  She reports they did not seem to help.    Past medical history: Lupus anticoagulant, GERD    Social history: She lives in a home with center in a basement.  They are retiring here in [place].  She is a smoker.  She has a dog.    Family history: Mother, brother with allergies and grandmother with asthma    Review of Systems performed as above and the remainder is negative.       Current Outpatient Medications:      calcium carbonate-vitamin D3 (CALTRATE 600 PLUS D3) 600 mg(1,500mg) -400 unit per tablet, , Disp: , Rfl:      CERAVE Crea cream, , Disp: , Rfl:      omeprazole (PRILOSEC) 20 MG capsule, , Disp: , Rfl:      PROAIR HFA 90 mcg/actuation inhaler, , Disp: , Rfl:      sertraline (ZOLOFT) 25 MG tablet, , Disp: , Rfl:      VOLTAREN 1 % Gel, , Disp: , Rfl:      XARELTO 20 mg tablet, , Disp: , Rfl:      cetirizine (ZYRTEC) 10 MG tablet, , Disp: , Rfl:       "clobetasol-emollient (OLUX-E) 0.05 % topical foam, Apply topically 2 (two) times a day., Disp: , Rfl:      diazePAM (VALIUM) 5 MG tablet, , Disp: , Rfl:      fluticasone propionate (FLONASE) 50 mcg/actuation nasal spray, , Disp: , Rfl:      gabapentin (NEURONTIN) 300 MG capsule, , Disp: , Rfl:      MUCINEX 600 mg 12 hr tablet, , Disp: , Rfl:     Allergies   Allergen Reactions     Augmentin [Amoxicillin-Pot Clavulanate]      itchy     Fentanyl      itchy     Penicillins      itchy     Septra [Sulfamethoxazole-Trimethoprim]      itchy     Tramadol      itchy     Zyban [Bupropion Hcl]      Personality Change       BP 92/59   Pulse 84   Ht 5' 3\" (1.6 m)   Wt 139 lb (63 kg)   LMP 06/27/1989   BMI 24.62 kg/m    Gen: Pleasant female not in acute distress  HEENT: Eyes no erythema of the bulbar or palpebral conjunctiva, no edema. Ears: TMs well visualized, no effusions. Nose: No congestion, mucosa normal. Mouth: Throat clear, no lip or tongue edema.   Cardiac: Regular rate and rhythm, no murmurs, rubs or gallops  Respiratory: Clear to auscultation bilaterally, no adventitious breath sounds  Lymph: No supraclavicular or cervical lymphadenopathy  Skin: No rashes or lesions  Psych: Alert and oriented times 3    Last Percutaneous Allergy Test Results  Trees  Mayank, White  1:20 H  (W/F in mm): 0-0 (07/26/19 1053)  Birch Mix 1:20 H (W/F in mm): 0-0 (07/26/19 1053)  Elmore, Common 1:20 H (W/F in mm): 0-0 (07/26/19 1053)  Elm, American 1:20 H (W/F in mm): 0-0 (07/26/19 1053)  Manatee, Shagbark 1:20 H (W/F in mm): 0-0 (07/26/19 1053)  Maple, Hard/Sugar 1:20 H (W/F in mm): 0-0 (07/26/19 1053)  Miami Mix 1:20 H (W/F in mm): 0-0 (07/26/19 1053)  Oak, Red 1:20 H (W/F in mm): 0-0 (07/26/19 1053)  Defiance, American 1:20 H (W/F in mm): 0-0 (07/26/19 1053)  [place] Tree 1:20 H (W/F in mm): 0-0 (07/26/19 1053)  Dust Mites  D. Pteronyssinus Mite 30,000 AU/ML H (W/F in mm): 0-0 (07/26/19 1053)  D. Farinae Mite 30,000 AU/ML H (W/F in " mm: 0-0 (07/26/19 1053)  Grasses  Grass Mix #4 10,000 BAU/ML H: 4-15 (07/26/19 1053)  Philip Grass 1:20 H (W/F in mm): 0-0 (07/26/19 1053)  Cockroach  Cockroach Mix 1:10 H (W/F in mm): 0-0 (07/26/19 1053)  Molds/Fungi  Alternaria Tenuis 1:10 H (W/F in mm): 0-0 (07/26/19 1053)  Aspergillus Fumigatus 1:10 H (W/F in mm): 0-0 (07/26/19 1053)  Homodendrum Cladosporioides 1:10 H (W/F in mm): 0-0 (07/26/19 1053)  Penicillin Notatum 1:10 H (W/F in mm): 0-0 (07/26/19 1053)  Epicoccum 1:10 H (W/F in mm): 0-0 (07/26/19 1053)  Weeds  Ragweed, Short 1:20 H (W/F in mm): 0-0 (07/26/19 1053)  Dock, [place] 1:20 H (W/F in mm): 0-0 (07/26/19 1053)  Lamb's Quarter 1:20 H (W/F in mm): 0-0 (07/26/19 1053)  Pigweed, Rough Red Root 1:20 H  (W/F in mm): 0-0 (07/26/19 1053)  Plantain, English 1:20 H  (W/F in mm): 0-0 (07/26/19 1053)  Sagebrush, Mugwort 1:20 H  (W/F in mm): 0-0 (07/26/19 1053)  Animal  Cat 10,000 BAU/ML H (W/F in mm): 0-0 (07/26/19 1053)  Dog 1:10 H (W/F in mm): 4-15 (07/26/19 1053)  Controls  Device Type: QUINTIP (07/26/19 1053)  Neg. control: 50% Glycerine/Saline H (W/F in mm): 0-0 (07/26/19 1053)  Pos. control: Histamine 6mg/ML (W/F in mms): 5-20 (07/26/19 1053)    Impression report and plan:  1.  Allergic rhinitis    Testing only positive for dog and grass.  I would like to check specific IgE testing and obtain her records from [place].  Pending these results, could consider allergy shots.  I went over the risks and benefits of allergy shots.  I stated risks include hives, swelling, shortness of breath.  I did state that one in 2.5 million shot administrations can result in death.  I stated they must wait in the office for 30 minutes following the shot and carry an epinephrine device on the day of the shot.  I stated that shots are effective in about 90% of patients.  I stated that they should check with the insurance company prior to proceeding.  They understand the risks and benefits and will let me know if she  would like to proceed.  Consent forms with her.    2.  Itching    Recommended increasing Zyrtec to twice daily.  Stated this is often not due to a specific allergic trigger.  She can increase her take up to 2 tablets twice daily if she so desires.  Went over specific triggers for chronic itch.

## 2021-05-30 NOTE — CONSULTS
[facility] Hematology and Oncology Consult Note    Patient: Trenton Milian  MRN: [id number]  Date of Service: 07/26/2019        Reason for Visit    I was consulted by Dr. Layton regarding a left leg DVT and lupus anticoagulant positivity.    Assessment/Plan    Ms. Trenton Milian is a 59 y.o. woman who apparently had a left lower leg DVT in February or March 2017.  She was started on Xarelto anticoagulation at that time.  Apparently she had some lupus anticoagulant testing done in [place] at that time and was told that she has the lupus anticoagulant disorder.  Because of that she has continued on Xarelto since then.  She has not had any other significant blood clots.  She describes having phlebitis in her arms when she was 16 or 17 years old after she had an ovarian cyst surgery.  From her description this does not sound to be DVTs.    1.  I discussed with her that from her description the left leg DVT does not appear to be particularly risky.  I am not too sure about the history of lupus anticoagulant positivity since she remembers the testing as being done while she was on Xarelto.  It is known that doing the testing while a person is on an anticoagulation agent that can lead to false positivity.  Particularly with agents like Xarelto.  I discussed with her that we will need to obtain the records from that time and see what exactly happened before we decide that she really has the lupus anticoagulant syndrome.  She voiced understanding.    2.  We decided to go ahead and obtain new bilateral lower extremity ultrasounds done to see whether there is any residual blood clot in the left leg especially.  I think this is important because if she is going to leave in [place] long-term, there is always a possibility that a residual blood clot in the left leg can be mistaken for a new blood clot.  She voiced understanding.    3.  If it turns out that the testing for lupus anticoagulant that was done in the 2017 was  doubtful based on the records, then we will not need to do further testing after stopping Xarelto for a couple of weeks.  I told her that I would prefer to do all the testing at one go instead of having her go through testing in several different phases.    4.  I have asked her to sign a release of information forms for the 2 clinics where she had care for the blood clots in [place].  I will await the records.  After reviewing them I will give her a call.  Depending on what find in the records we will arrange for appropriate follow-up and testing.  I asked her to expect a call from me next week.  She voiced understanding.      ECOG Performance   ECOG Performance Status: 0  Distress Assessment  Distress Assessment Score: No distress        Problem List    1. Lupus anticoagulant positive  US Venous Legs Bilateral   2. Deep vein thrombosis (DVT) of left lower extremity, unspecified chronicity, unspecified vein (H)  US Venous Legs Bilateral           CC: Chacorta Layton MD      ______________________________________________________________________________    History    Ms. Trenton Milian is a 59-year-old woman who is here for the consultation alone.    She says that her  retired recently from the  and they decided to come back home to [place].    While they lived in [place], [place], one day she says that she was taking her left shoe laces when she developed left foot pain.  This progressed to pain up into her cough.  She went to an urgent care and was diagnosed with a blood clot in her left leg near the knee.  She was started on Xarelto.  She thinks this was in February or March 2017.  Later she was seen by hematologist at [facility] in [place].  She says that at that time she had lupus anticoagulant testing done which came back positive.  Apparently the test was repeated once and was still positive.  She does not remember being told to stop Xarelto for the testing.  She  "has continued on Xarelto since then.    She has tolerated Xarelto anticoagulation very well.  She has not had any significant bleeding problems.  She has noticed that if she cuts her finger she does bleed more than she would earlier.  She says that couple weeks ago she had an episode of some pain in her left leg which went away after taking an aspirin.    She remembers having some painful swelling in her forearms after she had an ovarian cyst surgery when she was 16 or 17 years old.  She did have IVs in her arms.  She was told that it was \"phlebitis\" at that time.    She feels that she is doing well now physically.  She has no particular complaints.  Has some sinus issues going on off and on.  She has some residual mild numbness after she had a mandibular surgery.  She has some heartburn off and on.    Please see below.  A 14 point review of system is otherwise completely negative.    Past History  Past Medical History:   Diagnosis Date     Asthma      Depression      Environmental allergies      GERD (gastroesophageal reflux disease)      Left leg DVT (H)      Lupus (H)      Past Surgical History:   Procedure Laterality Date     ABDOMINOPLASTY       APPENDECTOMY        SECTION  1985     EXCISION / CURETTAGE BONE CYST PHALANGES OF FOOT Left 2008     MANDIBLE SURGERY      lower jaw advancement. Left over numbness of tongue and gums.Totally 5 surgeries.     OVARIAN CYST SURGERY Right      SINUS SURGERY Bilateral      TOTAL ABDOMINAL HYSTERECTOMY W/ BILATERAL SALPINGOOPHORECTOMY      for ovarian infection.     Family History   Problem Relation Age of Onset     Hypertension Mother      Thyroid disease Mother      Arthritis Mother      Hypertension Father      Heart disease Father         Valve replacement ,bypass     Arthritis Father      Gout Son      Breast cancer Maternal Grandmother      Other Maternal Grandmother         some sort blood clot.     Breast cancer Paternal Aunt      " Anxiety disorder Brother      Obesity Brother      Gout Son      Social History     Socioeconomic History     Marital status:      Spouse name: None     Number of children: None     Years of education: None     Highest education level: None   Occupational History     Employer: RETIRED   Social Needs     Financial resource strain: None     Food insecurity:     Worry: None     Inability: None     Transportation needs:     Medical: None     Non-medical: None   Tobacco Use     Smoking status: Current Every Day Smoker     Packs/day: 0.50     Years: 40.00     Pack years: 20.00     Types: Cigarettes     Smokeless tobacco: Never Used   Substance and Sexual Activity     Alcohol use: Never     Frequency: Never     Drug use: Never     Sexual activity: Yes     Partners: Male   Lifestyle     Physical activity:     Days per week: None     Minutes per session: None     Stress: None   Relationships     Social connections:     Talks on phone: None     Gets together: None     Attends Jewish service: None     Active member of club or organization: None     Attends meetings of clubs or organizations: None     Relationship status: None     Intimate partner violence:     Fear of current or ex partner: None     Emotionally abused: None     Physically abused: None     Forced sexual activity: None   Other Topics Concern     None   Social History Narrative     None     Allergies    Allergies   Allergen Reactions     Augmentin [Amoxicillin-Pot Clavulanate]      itchy     Fentanyl      itchy     Penicillins      itchy     Septra [Sulfamethoxazole-Trimethoprim]      itchy     Tramadol      itchy     Zyban [Bupropion Hcl]      Personality Change       Review of Systems    General  General (WDL): Exceptions to WDL  Fatigue: Yes - Chronic (Greater than 3 months)  Fever: None  Generalized Muscle Weakness: Yes - Chronic (Greater than 3 months)  Weight Loss: No  ENT  ENT (WDL): Exceptions to WDL  Vertigo (Dizziness): None  Changes in  vision: None  Glasses or Contacts: Yes - Chronic (Greater than 3 months)  Hearing loss: None  Hearing Aids: None  Tinnitus: Yes - Chronic (Greater than 3 months)  Pain/Pressure in ears: Yes - Recent (Less than 3 months)  Sinus Congestion/Drainage: Yes - Chronic (Greater than 3 months)  Hoarseness: None  Sore Throat: None  Dental Problems: None  Dentures: None  Respiratory  Respiratory (WDL): All respiratory elements are within defined limits  Cardiovascular  Cardiovascular (WDL): Exceptions to WDL  Palpitations: None  Edema Limbs: Yes - Recent (Less than 3 months)  Irregular Heart Beat: None  Chest Pain: None  Lightheadedness: Yes - Recent (Less than 3 months)  Endocrine  Endocrine (WDL): All endocrine elements are within defined limits  Gastrointestinal  Gastrointestinal (WDL): Exceptions to WDL  Difficulty Swallowing: None  Heartburn: Yes - Chronic (Greater than 3 months)  Constipation: None  Yellowish skin and/or eyes: None  Blood from rectum: None  Nausea and Vomiting: None  Abdominal Pain: None  Diarrhea: None  Have had black or tan stools?: None  Hemorrhoids: None  Poor Appetite: None  Musculoskeletal  Musculoskeletal (WDL): All musculoskeletal elements are within defined limits  Neurological  Neurological (WDL): Exceptions to WDL  History of LOC?: None  Headaches: None  Difficulty walking: None  Difficulty with speech: None  Difficulty with memory: None  Vertigo (Dizziness): None  Dominant Hand: Right  Seizures: None  Difficulty with Balance: None  Numbness and/or tingling: Yes - Recent (Less than 3 months)  Psychological/Emotional  Psychological/Emotional (WDL): Exceptions to WDL  Depression: None  Insomnia: None  Panic attacks: Yes - Chronic (Greater than 3 months)  Anxiety: Yes - Chronic (Greater than 3 months)  Daytime sleepiness: Yes - Recent (Less than 3 months)  Hallucinations: None  Psychosocial needs or not coping well: None  Hematological/Lymphatic  Hematological/Lymphatic (WDL): All  "hematological/lymphatic elements are within defined limits  Dermatological  Dermatologic (WDL): All dermatological elements are within defined limits  Genitourinary/Reproductive  Genitourinary/Reproductive (WDL): All genitourinary/reproductive elements are within defined limits  Reproductive (Females only)     Pain  Currently in Pain: Yes  Pain Score (Initial OR Reassessment): 3  Pain Frequency: Constant/continuous  Location: jaw, tongue  Pain Characteristics : Burning;Numbness  Pain Intervention(s): Other (Comment)    Physical Exam    Recent Vitals 7/26/2019   Height 5' 3\"   Weight 139 lbs 8 oz   BSA (m2) 1.68 m2   /64   Pulse 90   Temp -   Temp src -   SpO2 92       GENERAL: Alert and oriented to time place and person. Seated comfortably. In no distress.  She looks well overall.    HEAD: Atraumatic and normocephalic.    EYES: BELEN, EOMI.  No pallor.  No icterus.    Oral cavity: no mucosal lesion or tonsillar enlargement.    NECK: supple. JVP normal.  No thyroid enlargement.    LYMPH NODES: No palpable, cervical, axillary or inguinal lymphadenopathy.    CHEST: clear to auscultation bilaterally.  Resonant to percussion throughout bilaterally.  Symmetrical breath movements bilaterally.    CVS: S1 and S2 are heard. Regular rate and rhythm.  No murmur or gallop or rub heard.  No peripheral edema.    ABDOMEN: Soft. Not tender. Not distended.  No palpable hepatomegaly or splenomegaly.  No other mass palpable.  Bowel sounds heard.    EXTREMITIES: Warm.  There is no leg swelling on either side.  Homans sign negative and calf tenderness negative on both sides.    SKIN: no rash, or bruising or purpura.  Has a full head of hair.      Lab Results    No results found for this or any previous visit (from the past 168 hour(s)).    Imaging Results    No results found.      Signed by: Verito Bejarano MD  "

## 2021-05-30 NOTE — TELEPHONE ENCOUNTER
WQ order received for Hematology Consult, referring physician Calin Horn MD.  Spoke with the patient to set up appointment. Same scheduled for Friday, July 26, 2019 at 1:00 pm with Dr. Bejarano, with a nurse apt at 12:45 pm. Packet sent via [employer] to pt with informational letter, MD bio card, [facility] intake form, medication and allergy list, and appointment letter. Work up for Lupus Anticoagulant Disorder has been done at [facility].   Medical records will collect any outside records and ensure all records are available at the time of consultation.    I explained that the pt. would be coming to a Cancer Center and that the doctors are Oncologists as well as Hematologists. Explained the appointment process of arriving early and bringing her Health History and medication allergy list along to her appointment.   My phone number was given to the pt. for any question or concerns - [phone].

## 2021-05-30 NOTE — PROGRESS NOTES
Subjective:      Patient ID: Trenton Milian is a 58 y.o. female.    Chief Complaint:    Recently moved from [place] with her .    Cough   This is a new problem. The current episode started 1 to 4 weeks ago. The problem occurs constantly. The problem has been gradually worsening. Associated symptoms include chest pain, chills, congestion, coughing, fatigue and headaches. Pertinent negatives include no fever, neck pain, sore throat, swollen glands or vomiting. The symptoms are aggravated by coughing and exertion. She has tried NSAIDs for the symptoms. The treatment provided mild relief.   Sinus Problem   This is a recurrent (Has a history of chronic sinus issues with seasonal allergy.) problem. The current episode started 1 to 4 weeks ago. The problem has been gradually worsening since onset. There has been no fever. The pain is moderate. Associated symptoms include chills, congestion, coughing, headaches and sinus pressure. Pertinent negatives include no ear pain, neck pain, sore throat or swollen glands. Past treatments include acetaminophen and spray decongestants.       The following portions of the patient's history were reviewed and updated as appropriate: allergies, current medications, past family history, past medical history, past social history, past surgical history and problem list.       No past medical history on file.    No past surgical history on file.    No family history on file.    Social History     Tobacco Use     Smoking status: Current Every Day Smoker     Smokeless tobacco: Never Used   Substance Use Topics     Alcohol use: Not on file     Drug use: Not on file       Review of Systems   Constitutional: Positive for chills and fatigue. Negative for fever.   HENT: Positive for congestion, postnasal drip, sinus pressure and sinus pain. Negative for ear pain and sore throat.    Respiratory: Positive for cough and chest tightness. Negative for wheezing.    Cardiovascular: Positive for  chest pain.   Gastrointestinal: Negative for vomiting.   Musculoskeletal: Negative for neck pain.   Neurological: Positive for headaches. Negative for light-headedness.       Objective:     /66 (Patient Site: Right Arm, Patient Position: Sitting, Cuff Size: Adult Regular)   Pulse 85   Temp 98.4  F (36.9  C) (Oral)   Resp 18   Wt 142 lb (64.4 kg)   LMP 06/27/1989   SpO2 96%   Breastfeeding? No     Physical Exam   Constitutional: She appears well-developed and well-nourished.   HENT:   Right Ear: Tympanic membrane normal.   Left Ear: Tympanic membrane normal.  No middle ear effusion.   Nose: Right sinus exhibits maxillary sinus tenderness. Right sinus exhibits no frontal sinus tenderness. Left sinus exhibits maxillary sinus tenderness. Left sinus exhibits no frontal sinus tenderness.   Postnasal drip.   Neck: Normal range of motion.   Cardiovascular: Normal rate and regular rhythm.   Pulmonary/Chest: Effort normal. She has decreased breath sounds. She has no wheezes.   Reduced breath sounds diffusely on both sides.  Moderate improvement with DuoNeb.   Lymphadenopathy:     She has no cervical adenopathy.   Neurological: She is alert.       Assessment:     Procedures    1. Acute non-recurrent sinusitis, unspecified location  - azithromycin (ZITHROMAX Z-NORTH) 250 MG tablet; Take 500 mg (2 x 250 mg tablets) on day 1 followed by 250 mg (1 tablet) on days 2-5.  Dispense: 6 tablet; Refill: 0    2. Cough  - ipratropium-albuterol 0.5-2.5 mg/3 mL nebulizer solution 3 mL (DUO-NEB)      Plan:   Does have sinusitis and will treat as noted above.Advised to use her Inhalers, and take some NSAIDs to lessen pain.Follow up if not better.

## 2021-05-30 NOTE — PROGRESS NOTES
ASSESSMENT:  1. Environmental allergies  - Ambulatory referral to Allergy  According to her request she did get a referral to the allergist.  I will discuss possibility of get some further allergy shots following testing.  2. Lupus anticoagulant disorder (H)  - Ambulatory referral to Hematology  She is not having any major concerns at this time regarding the anticoagulant disorder, but she wanted to establish care in case.  3. Encounter to establish care  4. Chronic gastritis without bleeding, unspecified gastritis type  Currently being controlled with omeprazole she has had an EGD in the past.  She will continue with the omeprazole at this time.    5. Anxiety and depression    6. Restless leg syndrome  All of her major concerns are stable.  We will continue to follow and refill her medication when she asked for that.  I spent 40 mins with patient and more than half of that time was spent on counseling ,review of reports and treatment planning.      PLAN:  She did sign a release of information and will await formal information to get to us regarding previous health maintenance completions.    Orders Placed This Encounter   Procedures     Ambulatory referral to Allergy     Referral Priority:   Routine     Referral Type:   Allergy, Asthma, and Immunology     Referral Reason:   Evaluation and Treatment     Requested Specialty:   Allergy     Number of Visits Requested:   1     Ambulatory referral to Hematology     Referral Priority:   Routine     Referral Type:   Consultation     Referral Reason:   Evaluation and Treatment     Requested Specialty:   Hematology     Number of Visits Requested:   1     There are no discontinued medications.    No follow-ups on file.      CHIEF COMPLAINT:  Chief Complaint   Patient presents with     Establish Care     Referral     allergist and hematologist        HISTORY OF PRESENT ILLNESS:  Trenton is a 58 y.o. female presenting to the clinic today for establishment of care . Newly moved  to the [place] from [place].  Today she does not really have any major concerns.  She was recently seen and treated for sinus infection about a week ago.  She noted that she is still recovering but still having some runny nose and coughing up some phlegm.  Still feels plugged up in the ear but otherwise feels good.  We did review her health history, she does have a history of lupus anticoagulant deficiency for which she has had a history of DVTs but on the upper extremities as well as the lower extremity.  She is currently on Xarelto for that.  She also has a history of mild depression, and takes about 12.5 mg of sertraline which has been helpful for her.  She has had mandibular surgery and that caused complications that damaged her nebs.  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  For that she is been taking diazepam as well as gabapentin as needed.  She does have allergy that is environmental but noted worsening seasonal.  She was on allergy shots in the past, wanting to have a referral to see the allergist at this time in order to consider having allergy shots again.  She does have a history of restless leg syndrome and uses gabapentin as well for that.  There is also a history of gastritis which is controlled with Prilosec.  For all these have medical concerns she does not really have any at this time for those.    REVIEW OF SYSTEMS:   As noted above she does have some cough with fullness to the ear.  But no chills and no fevers at this time.  Cough is productive.  No chest pain or shortness of breath mild intermittent wheezing.  Denies having any joint aches and pain, no abdominal discomfort, no constipation and no diarrhea.  Denies having any headaches at this time.  She does feel well and is able to be physically active as she can.  All other systems are negative.    PFSH:  Reviewed, as below.    Social History     Tobacco Use   Smoking Status Current Every Day Smoker      Packs/day: 0.50   Smokeless Tobacco Never Used       Family History   Problem Relation Age of Onset     Hypertension Mother      Thyroid disease Mother      Arthritis Mother      Hypertension Father      Heart disease Father         Valve replacement ,bypass     Arthritis Father        Social History     Socioeconomic History     Marital status:      Spouse name: Not on file     Number of children: Not on file     Years of education: Not on file     Highest education level: Not on file   Occupational History     Not on file   Social Needs     Financial resource strain: Not on file     Food insecurity:     Worry: Not on file     Inability: Not on file     Transportation needs:     Medical: Not on file     Non-medical: Not on file   Tobacco Use     Smoking status: Current Every Day Smoker     Packs/day: 0.50     Smokeless tobacco: Never Used   Substance and Sexual Activity     Alcohol use: Not on file     Drug use: Not on file     Sexual activity: Yes     Partners: Male   Lifestyle     Physical activity:     Days per week: Not on file     Minutes per session: Not on file     Stress: Not on file   Relationships     Social connections:     Talks on phone: Not on file     Gets together: Not on file     Attends Congregation service: Not on file     Active member of club or organization: Not on file     Attends meetings of clubs or organizations: Not on file     Relationship status: Not on file     Intimate partner violence:     Fear of current or ex partner: Not on file     Emotionally abused: Not on file     Physically abused: Not on file     Forced sexual activity: Not on file   Other Topics Concern     Not on file   Social History Narrative     Not on file       Past Surgical History:   Procedure Laterality Date     MANDIBLE SURGERY         Allergies   Allergen Reactions     Augmentin [Amoxicillin-Pot Clavulanate]      itchy     Fentanyl      itchy     Penicillins      itchy     Septra  "[Sulfamethoxazole-Trimethoprim]      itchy     Tramadol      itchy     Zyban [Bupropion Hcl]      Personality Change       Active Ambulatory Problems     Diagnosis Date Noted     No Active Ambulatory Problems     Resolved Ambulatory Problems     Diagnosis Date Noted     No Resolved Ambulatory Problems     Past Medical History:   Diagnosis Date     DVT of lower extremity (deep venous thrombosis) (H)      DVT of upper extremity (deep vein thrombosis) (H)      Lupus (H)        Current Outpatient Medications   Medication Sig Dispense Refill     calcium carbonate-vitamin D3 (CALTRATE 600 PLUS D3) 600 mg(1,500mg) -400 unit per tablet        CERAVE Crea cream        cetirizine (ZYRTEC) 10 MG tablet        clobetasol-emollient (OLUX-E) 0.05 % topical foam Apply topically 2 (two) times a day.       diazePAM (VALIUM) 5 MG tablet        fluticasone propionate (FLONASE) 50 mcg/actuation nasal spray        gabapentin (NEURONTIN) 300 MG capsule        MUCINEX 600 mg 12 hr tablet        omeprazole (PRILOSEC) 20 MG capsule        PROAIR HFA 90 mcg/actuation inhaler        sertraline (ZOLOFT) 25 MG tablet        VOLTAREN 1 % Gel        XARELTO 20 mg tablet        No current facility-administered medications for this visit.        VITALS:  Vitals:    07/03/19 0912   BP: 104/68   Pulse: 88   SpO2: 96%   Weight: 140 lb (63.5 kg)   Height: 5' 3\" (1.6 m)     Wt Readings from Last 3 Encounters:   07/03/19 140 lb (63.5 kg)   06/27/19 142 lb (64.4 kg)     Body mass index is 24.8 kg/m .    PHYSICAL EXAM:  General Appearance: Alert, cooperative, no distress, appears stated age.  Afebrile to touch and not pale.  HEENT: Pupils are equal and reactive, extraocular motions is normal.  Oropharynx is moist.  Neck is supple no notable thyromegaly and no lymphadenopathy.  External ears are normal.  Tympanic membrane look normal.  Lungs: Clear to auscultation bilaterally, respirations unlabored.  Heart: Regular rate and rhythm, S1 and S2 " normal.  Abdomen: Soft, nontender no masses were felt.  Skin: Normal with no rashes and normal skin turgor.  Musculoskeletal: Normal range of motion. No joint swelling or deformity.   Neurologic:  Alert and oriented times 3. Cranial nerves II-XII intact.   Psychiatric: Normal mood and affect.    MEDICATIONS:  Current Outpatient Medications   Medication Sig Dispense Refill     calcium carbonate-vitamin D3 (CALTRATE 600 PLUS D3) 600 mg(1,500mg) -400 unit per tablet        CERAVE Crea cream        cetirizine (ZYRTEC) 10 MG tablet        clobetasol-emollient (OLUX-E) 0.05 % topical foam Apply topically 2 (two) times a day.       diazePAM (VALIUM) 5 MG tablet        fluticasone propionate (FLONASE) 50 mcg/actuation nasal spray        gabapentin (NEURONTIN) 300 MG capsule        MUCINEX 600 mg 12 hr tablet        omeprazole (PRILOSEC) 20 MG capsule        PROAIR HFA 90 mcg/actuation inhaler        sertraline (ZOLOFT) 25 MG tablet        VOLTAREN 1 % Gel        XARELTO 20 mg tablet        No current facility-administered medications for this visit.

## 2021-05-31 NOTE — PROGRESS NOTES
Chief Complaint   Patient presents with     Urinary Tract Infection     buring, seems to get better as day goes by then burning when wakes up, getting worse         HPI      Patient is here for a week of dysuria with increased urinary frequency. No fever, gross hematuria, abdominal pain, flank pain.    ROS: Pertinent ROS noted in HPI.     Allergies   Allergen Reactions     Augmentin [Amoxicillin-Pot Clavulanate]      itchy     Fentanyl      itchy     Penicillins      itchy     Septra [Sulfamethoxazole-Trimethoprim]      itchy     Tramadol      itchy     Zyban [Bupropion Hcl]      Personality Change       Patient Active Problem List   Diagnosis     Left leg DVT (H)       Family History   Problem Relation Age of Onset     Hypertension Mother      Thyroid disease Mother      Arthritis Mother      Hypertension Father      Heart disease Father         Valve replacement ,bypass     Arthritis Father      Gout Son      Breast cancer Maternal Grandmother      Other Maternal Grandmother         some sort blood clot.     Breast cancer Paternal Aunt      Anxiety disorder Brother      Obesity Brother      Gout Son        Social History     Socioeconomic History     Marital status:      Spouse name: Not on file     Number of children: Not on file     Years of education: Not on file     Highest education level: Not on file   Occupational History     Employer: RETIRED   Social Needs     Financial resource strain: Not on file     Food insecurity:     Worry: Not on file     Inability: Not on file     Transportation needs:     Medical: Not on file     Non-medical: Not on file   Tobacco Use     Smoking status: Current Every Day Smoker     Packs/day: 0.50     Years: 40.00     Pack years: 20.00     Types: Cigarettes     Smokeless tobacco: Never Used   Substance and Sexual Activity     Alcohol use: Never     Frequency: Never     Drug use: Never     Sexual activity: Yes     Partners: Male   Lifestyle     Physical activity:     Days  per week: Not on file     Minutes per session: Not on file     Stress: Not on file   Relationships     Social connections:     Talks on phone: Not on file     Gets together: Not on file     Attends Hindu service: Not on file     Active member of club or organization: Not on file     Attends meetings of clubs or organizations: Not on file     Relationship status: Not on file     Intimate partner violence:     Fear of current or ex partner: Not on file     Emotionally abused: Not on file     Physically abused: Not on file     Forced sexual activity: Not on file   Other Topics Concern     Not on file   Social History Narrative     Not on file         Objective:    Vitals:    08/11/19 0842   BP: 93/61   Pulse: 88   Resp: 16   Temp: 98.2  F (36.8  C)   SpO2: 95%       Gen:NAD  CV: RRR, no M, R, G  Pulm: CTAB  Abd: Normal bowel sounds, normal palpation, no mass/HSM  MSK: normal palpation of back, no CVA tenderness.     Recent Results (from the past 24 hour(s))   Urinalysis-UC if Indicated   Result Value Ref Range    Color, UA Yellow Colorless, Yellow, Straw, Light Yellow    Clarity, UA Slightly Cloudy (!) Clear    Glucose, UA Negative Negative    Bilirubin, UA Negative Negative    Ketones, UA Negative Negative    Specific Gravity, UA 1.020 1.005 - 1.030    Blood, UA Moderate (!) Negative    pH, UA 6.5 5.0 - 8.0    Protein, UA Negative Negative mg/dL    Urobilinogen, UA 0.2 E.U./dL 0.2 E.U./dL, 1.0 E.U./dL    Nitrite, UA Negative Negative    Leukocytes, UA Large (!) Negative    Bacteria, UA Few (!) None Seen hpf    RBC, UA 5-10 (!) None Seen, 0-2 hpf    WBC, UA 10-25 (!) None Seen, 0-5 hpf    Squam Epithel, UA 0-5 None Seen, 0-5 lpf         Acute cystitis without hematuria  -     nitrofurantoin, macrocrystal-monohydrate, (MACROBID) 100 MG capsule; Take 1 capsule (100 mg total) by mouth 2 (two) times a day for 5 days.    UTI symptoms  -     Urinalysis-UC if Indicated  -     Culture, Urine

## 2021-05-31 NOTE — TELEPHONE ENCOUNTER
Intermediate coverage of nitrofurantoin which patient was prescribed.  Called patient, she reports no improvement in symptoms.  Culture shows sensitivity to 3rd generation cephalosporins, rx for cefdinir sent to pharmacy, she will discontinue nitrofurantoin.  Follow up in one week if no improvement.

## 2021-05-31 NOTE — TELEPHONE ENCOUNTER
KAYE at [facility], [facility] [phone], and asked for the fax number there I can send TIANA to.

## 2021-05-31 NOTE — TELEPHONE ENCOUNTER
I was able to talk to Mrs. Milian on the telephone.  I explained to her that we did obtain the medical records from [place].  The records confirm that the 2 times she had the lupus anticoagulant tested she was on Xarelto.  Apparently the DRV VT was positive with a negative anticardiolipin antibodies and beta-2 glycoprotein antibodies.    The other notable thing was that she had the DVTs a week after she was started on Premarin.  This makes it likely that this was a provoked event.  In addition to the right leg DVT she also had a left popliteal vein thrombosis.    I discussed with her that from the information that we have, I think there is a good possibility that lupus anticoagulant testing was falsely positive.    I recommended to her that she should stop the Xarelto now and recheck the labs including the lupus anticoagulant panel in 3 weeks time.  If the lupus anticoagulant is negative at that time then we know that it is truly negative and she need not continue on anticoagulation.    Meanwhile to try to reduce risk of other blood clots she can take a baby aspirin.  She voiced understanding.    I asked her to expect a call on Monday to set up the lab appointment.  She voiced understanding.    Verito Bejarano MD

## 2021-06-01 ENCOUNTER — COMMUNICATION - HEALTHEAST (OUTPATIENT)
Dept: FAMILY MEDICINE | Facility: CLINIC | Age: 61
End: 2021-06-01

## 2021-06-01 ENCOUNTER — OFFICE VISIT - HEALTHEAST (OUTPATIENT)
Dept: FAMILY MEDICINE | Facility: CLINIC | Age: 61
End: 2021-06-01

## 2021-06-01 DIAGNOSIS — G25.81 RESTLESS LEG SYNDROME: ICD-10-CM

## 2021-06-01 DIAGNOSIS — G47.00 INSOMNIA, UNSPECIFIED TYPE: ICD-10-CM

## 2021-06-01 DIAGNOSIS — M54.2 CERVICAL PAIN (NECK): ICD-10-CM

## 2021-06-01 LAB
BASOPHILS # BLD AUTO: 0.1 THOU/UL (ref 0–0.2)
BASOPHILS NFR BLD AUTO: 1 % (ref 0–2)
EOSINOPHIL # BLD AUTO: 0.3 THOU/UL (ref 0–0.4)
EOSINOPHIL NFR BLD AUTO: 3 % (ref 0–6)
ERYTHROCYTE [DISTWIDTH] IN BLOOD BY AUTOMATED COUNT: 13 % (ref 11–14.5)
FOLATE SERPL-MCNC: 15.5 NG/ML
HCT VFR BLD AUTO: 43.9 % (ref 35–47)
HGB BLD-MCNC: 14.5 G/DL (ref 12–16)
IMM GRANULOCYTES # BLD: 0 THOU/UL
IMM GRANULOCYTES NFR BLD: 0 %
IRON SATN MFR SERPL: 25 % (ref 20–50)
IRON SERPL-MCNC: 107 UG/DL (ref 42–175)
LYMPHOCYTES # BLD AUTO: 2.8 THOU/UL (ref 0.8–4.4)
LYMPHOCYTES NFR BLD AUTO: 27 % (ref 20–40)
MCH RBC QN AUTO: 33 PG (ref 27–34)
MCHC RBC AUTO-ENTMCNC: 33 G/DL (ref 32–36)
MCV RBC AUTO: 100 FL (ref 80–100)
MONOCYTES # BLD AUTO: 0.8 THOU/UL (ref 0–0.9)
MONOCYTES NFR BLD AUTO: 7 % (ref 2–10)
NEUTROPHILS # BLD AUTO: 6.2 THOU/UL (ref 2–7.7)
NEUTROPHILS NFR BLD AUTO: 61 % (ref 50–70)
PLATELET # BLD AUTO: 278 THOU/UL (ref 140–440)
PMV BLD AUTO: 11.5 FL (ref 7–10)
RBC # BLD AUTO: 4.39 MILL/UL (ref 3.8–5.4)
TIBC SERPL-MCNC: 424 UG/DL (ref 313–563)
TRANSFERRIN SERPL-MCNC: 339 MG/DL (ref 212–360)
VIT B12 SERPL-MCNC: 477 PG/ML (ref 213–816)
WBC: 10.2 THOU/UL (ref 4–11)

## 2021-06-01 RX ORDER — TRAZODONE HYDROCHLORIDE 50 MG/1
50 TABLET, FILM COATED ORAL AT BEDTIME
Qty: 90 TABLET | Refills: 0 | Status: SHIPPED | OUTPATIENT
Start: 2021-06-01 | End: 2021-08-31

## 2021-06-01 RX ORDER — DIAZEPAM 10 MG
TABLET ORAL
Qty: 30 TABLET | Refills: 5 | Status: SHIPPED | OUTPATIENT
Start: 2021-06-01 | End: 2022-01-27

## 2021-06-01 ASSESSMENT — ANXIETY QUESTIONNAIRES
3. WORRYING TOO MUCH ABOUT DIFFERENT THINGS: NOT AT ALL
GAD7 TOTAL SCORE: 7
7. FEELING AFRAID AS IF SOMETHING AWFUL MIGHT HAPPEN: NOT AT ALL
IF YOU CHECKED OFF ANY PROBLEMS ON THIS QUESTIONNAIRE, HOW DIFFICULT HAVE THESE PROBLEMS MADE IT FOR YOU TO DO YOUR WORK, TAKE CARE OF THINGS AT HOME, OR GET ALONG WITH OTHER PEOPLE: SOMEWHAT DIFFICULT
6. BECOMING EASILY ANNOYED OR IRRITABLE: NOT AT ALL
4. TROUBLE RELAXING: NEARLY EVERY DAY
5. BEING SO RESTLESS THAT IT IS HARD TO SIT STILL: NEARLY EVERY DAY
1. FEELING NERVOUS, ANXIOUS, OR ON EDGE: SEVERAL DAYS
2. NOT BEING ABLE TO STOP OR CONTROL WORRYING: NOT AT ALL

## 2021-06-01 ASSESSMENT — PATIENT HEALTH QUESTIONNAIRE - PHQ9: SUM OF ALL RESPONSES TO PHQ QUESTIONS 1-9: 13

## 2021-06-01 NOTE — TELEPHONE ENCOUNTER
I recommendation is to hold Xarelto for 4 days before the procedure.  If the procedure goes well she can restart her Xarelto the next day.    Verito Bejarano MD

## 2021-06-01 NOTE — PROGRESS NOTES
[facility] Hematology and Oncology Progress Note    Patient: Trenton Milian  MRN: [id number]  Date of Service: 09/10/2019        Reason for Visit    Follow-up regarding left leg DVT and lupus anticoagulant positivity.    Assessment and Plan      ECOG Performance   ECOG Performance Status: 0    Distress Assessment  Distress Assessment Score: No distress    Pain  Pain Score (Initial OR Reassessment): 4: Continue with regular management of headaches.    Ms. Trenton Milian is a 59 y.o. woman who had a left lower leg DVT in early February 2017.  Apparently the DVT occurred in the peroneal vein a couple of weeks after she was started on Premarin.  She was started on Xarelto anticoagulation at that time.  She had lupus anticoagulant testing in [place] at that time and was told that she has the lupus anticoagulant disorder.  On reviewing her previous medical records, it appears that both times this is done without stopping anticoagulation.  She has continued on Xarelto since then.  She has not had any other significant blood clots.  She is a history of having superficial phlebitis in her arms when she was 16 or 17 years old when she had an ovarian cyst surgery.    1.  With the permission we have obtained records from [facility] where she was seen.  We could not obtain records from the clinic where she urgently had the DVT diagnosed.  I also access to her records from [facility] after she signed the care everywhere consent.  This confirms the history that she gives.  Some additional information was also available.  This was updated in the medical record.    2.  She has had a new venous ultrasound of the legs done on 9/4/2019 and it shows that there is no residual DVT to be seen.    3.  In the last 3 or 4 weeks when she has been off of Xarelto she has not had any bleeding or clotting problems.    4.  The lab test that we did on 9/4/2019, counts are essentially normal.  D-dimer is low.  This is  reassuring that there is no active blood clotting going on.  The antiphospholipid antibodies were negative but the lupus anticoagulant was again positive with the platelet neutralization test being positive.    5.  I discussed with the patient that with the lupus anticoagulant coming back a third time positive, this time years out from having the blood clot and after being off Xarelto for 3 weeks, it is clear that she has lupus anticoagulant syndrome.  I explained to her what this is.  I explained to her that this does not mean that she has lupus.  It is not an anticoagulant, it actually increase the risk of blood clotting.  At this point I think we do need to put her back on anticoagulation.  I discussed with her that there are some things in her history which are reassuring like the fact that the blood clot was a distal blood clot in her leg and a relatively small, the fact that it happened after she was on Premarin and has not been repeated since.  However I think we should put her on anticoagulation and to prevent further blood clots.  We discussed whether to try the low-dose of Xarelto at 10 mg that is an option.  She decided that she would like to discontinue the regular dose of Xarelto 20 mg p.o. daily which she was comfortable with.  She says that I do not need to send a new prescription because she is still has refills left.  I told her that it is okay to continue at Xarelto 20 mg p.o. daily.  She should receive further refills from her primary physician.    6.  We discussed about measures that she can take to prevent further blood clots.  I advised her to stay away from any hormonal agents.  She should remain physically active.    7.  She is still smoking.  I discussed with her that smoking increases risk by 7 times over about the risk that she already has.  Thus it is very important that she quit smoking.  We discussed about setting a quit date in getting her to hold the cigarettes at home.  I am giving  her prescriptions for nicotine patches and nicotine gum.  I am also referring her to the quit plan clinic.  Additional time spent: 6 minutes.    8.  When I reviewed the medical records from [facility] I noted that she was admitted to the hospital on 9/15/2014 with chest pain and in the ED she was found to have an esophageal stricture and a gastric ulcer.  She says that even now she has little bit of difficulty swallowing at times where she feels that the food gets stuck.  She also has a sensation of bloating in the stomach.  She has not had any GI follow-up since 2014.  I am referring her to [place] [facility].    9.  At this point I do not think she needs ongoing hematology follow-up.  She should continue her regular follow-up with her PCP.  I remain available for any assistance down the line.    Time spend >25 minutes face to face with the patient. More than 50 % in counseling and coordination of care.      Problem List    1. Deep vein thrombosis (DVT) of left lower extremity, unspecified chronicity, unspecified vein (H)     2. Lupus anticoagulant disorder (H)     3. Encounter for tobacco use cessation counseling  Ambulatory referral to Tobacco Cessation Program    nicotine (NICODERM CQ) 7 mg/24 hr    nicotine polacrilex (NICORETTE) 2 mg gum   4. Esophageal stricture  Ambulatory referral to Gastroenterology   5. Bloating  Ambulatory referral to Gastroenterology        CC: Chacorta Layton MD    ______________________________________________________________________________    History of Present Illness    Ms. Trenton Milian is here for follow-up.  She has a bit of a headache going on.  Otherwise she says that she is feeling well.  She has been doing well in the last few months.  She has not had any bleeding or clotting problems.    She admits that she is still smoking about 15 cigarettes/day.  She says that she has quit completely in the past but when she feels stressed out that is  when she smokes.    She admits that occasionally she feels a sensation acid food is getting stuck when she swallows.  She also has off-and-on a feeling of abdominal bloating.  She says that she really has not had the esophageal stricture or the gastric ulcer followed up after .    Please see below.  A 14 point review of system is otherwise completely negative.    Pain Status  Currently in Pain: Yes    Review of Systems    Constitutional  Constitutional (WDL): All constitutional elements are within defined limits  Neurosensory     Cardiovascular  Cardiovascular (WDL): All cardiovascular elements are within defined limits  Pulmonary  Respiratory (WDL): Within Defined Limits  Gastrointestinal  Gastrointestinal (WDL): All gastrointestinal elements are within defined limits  Genitourinary  Genitourinary (WDL): All genitourinary elements are within defined limits  Integumentary  Integumentary (WDL): All integumentary elements are within defined limits  Patient Coping  Patient Coping: Open/discussion  Distress Assessment  Distress Assessment Score: No distress  Accompanied by  Accompanied by: Alone    Past History  Past Medical History:   Diagnosis Date     Asthma      Depression      Environmental allergies      GERD (gastroesophageal reflux disease)      Left leg DVT (H)      Lupus (H)          Past Surgical History:   Procedure Laterality Date     ABDOMINOPLASTY       APPENDECTOMY        SECTION       ESOPHAGOGASTRODUODENOSCOPY  09/15/2014    Esophageal stricture and gastric ulceration.     EXCISION / CURETTAGE BONE CYST PHALANGES OF FOOT Left      MANDIBLE SURGERY  2007    lower jaw advancement. Left over numbness of tongue and gums.Totally 5 surgeries.     OVARIAN CYST SURGERY Right 1977     SINUS SURGERY Bilateral      TOTAL ABDOMINAL HYSTERECTOMY W/ BILATERAL SALPINGOOPHORECTOMY      for ovarian infection.       Physical Exam    Recent Vitals 9/10/2019   Height -   Weight 143 lbs  3 oz   BSA (m2) 1.7 m2   /54   Pulse 99   Temp 98.4   Temp src 1   SpO2 95   Some recent data might be hidden       GENERAL: Alert and oriented to time place and person. Seated comfortably. In no distress.  She looks well overall.  Very pleasant.    HEAD: Atraumatic and normocephalic.    EYES: BELEN, EOMI.  No pallor.  No icterus.    Oral cavity: no mucosal lesion or tonsillar enlargement.    NECK: supple. JVP normal.  No thyroid enlargement.    LYMPH NODES: No palpable, cervical, axillary or inguinal lymphadenopathy.    ABDOMEN: Soft. Not tender. Not distended.    EXTREMITIES: Warm.  No leg edema.    SKIN: no rash, or bruising or purpura.  Has a full head of hair.      Lab Results    Recent Results (from the past 168 hour(s))   Urinalysis-UC if Indicated   Result Value Ref Range    Color, UA Yellow Colorless, Yellow, Straw, Light Yellow    Clarity, UA Clear Clear    Glucose, UA Negative Negative    Bilirubin, UA Negative Negative    Ketones, UA Negative Negative    Specific Gravity, UA 1.010 1.005 - 1.030    Blood, UA Negative Negative    pH, UA 6.5 5.0 - 8.0    Protein, UA Negative Negative mg/dL    Urobilinogen, UA 0.2 E.U./dL 0.2 E.U./dL, 1.0 E.U./dL    Nitrite, UA Negative Negative    Leukocytes, UA Trace (!) Negative    Bacteria, UA None Seen None Seen hpf    RBC, UA 0-2 None Seen, 0-2 hpf    WBC, UA 0-5 None Seen, 0-5 hpf    Squam Epithel, UA 0-5 None Seen, 0-5 lpf   Culture, Urine   Result Value Ref Range    Culture No Growth    D-dimer, Quantitative   Result Value Ref Range    D-Dimer, Quant 0.32 <=0.50 FEU ug/mL   Lupus Anticoagulant   Result Value Ref Range    Lupus Result Positive (!) NEG   Phospholipid Ab (Cardiolip) IgM/IgG   Result Value Ref Range    Cardiolipin [place] IgG <1.6 0.0 - 19.9 GPL-U/mL    Cardiolipin [place] IgM 0.4 0.0 - 19.9 MPL-U/mL   HM1 (CBC with Diff)   Result Value Ref Range    WBC 10.3 4.0 - 11.0 thou/uL    RBC 4.68 3.80 - 5.40 mill/uL    Hemoglobin 15.5 12.0 - 16.0 g/dL     Hematocrit 47.7 (H) 35.0 - 47.0 %     (H) 80 - 100 fL    MCH 33.1 27.0 - 34.0 pg    MCHC 32.5 32.0 - 36.0 g/dL    RDW 13.5 11.0 - 14.5 %    Platelets 218 140 - 440 thou/uL    MPV 11.9 8.5 - 12.5 fL    Neutrophils % 64 50 - 70 %    Lymphocytes % 28 20 - 40 %    Monocytes % 5 2 - 10 %    Eosinophils % 2 0 - 6 %    Basophils % 1 0 - 2 %    Neutrophils Absolute 6.6 2.0 - 7.7 thou/uL    Lymphocytes Absolute 2.9 0.8 - 4.4 thou/uL    Monocytes Absolute 0.5 0.0 - 0.9 thou/uL    Eosinophils Absolute 0.3 0.0 - 0.4 thou/uL    Basophils Absolute 0.1 0.0 - 0.2 thou/uL       Imaging    Us Venous Legs Bilateral    Result Date: 9/4/2019  EXAM: US VENOUS LEGS BILATERAL LOCATION: [facility] DATE/TIME: 9/4/2019 2:51 PM INDICATION: Left leg pain. Previous history of DVT. COMPARISON: None available. TECHNIQUE: Routine exam with compression, augmentation, and duplex utilizing 2D gray-scale imaging, Doppler interrogation with color-flow and spectral waveform analysis. FINDINGS: The common femoral, femoral, popliteal, and segmentally visualized calf veins were evaluated. Right leg veins are negative for deep venous thrombosis. Left leg veins are negative for deep venous thrombosis. No popliteal cysts.     CONCLUSION: 1.  Bilateral leg veins are negative for DVT.        Signed by: Verito Bejarano MD

## 2021-06-01 NOTE — TELEPHONE ENCOUNTER
Spoke with scheduling and instructed them of Dr Bejarano's recommendations. Understanding was verbalized and patient will be contacted with information from [facility]. Hansa Bradshaw, [facility]

## 2021-06-01 NOTE — TELEPHONE ENCOUNTER
[facility] is calling requesting bridging and hold information for Paulacelsa. She is having a colonoscopy and upper endoscopy. Will review with Dr Bejarano for instruction recommendation. Hansa Bradshaw, [facility]

## 2021-06-03 VITALS
OXYGEN SATURATION: 95 % | SYSTOLIC BLOOD PRESSURE: 128 MMHG | WEIGHT: 145.38 LBS | DIASTOLIC BLOOD PRESSURE: 86 MMHG | HEART RATE: 91 BPM | BODY MASS INDEX: 25.75 KG/M2

## 2021-06-03 VITALS
WEIGHT: 143.2 LBS | SYSTOLIC BLOOD PRESSURE: 108 MMHG | BODY MASS INDEX: 25.37 KG/M2 | HEART RATE: 99 BPM | TEMPERATURE: 98.4 F | OXYGEN SATURATION: 95 % | DIASTOLIC BLOOD PRESSURE: 54 MMHG

## 2021-06-03 VITALS — HEIGHT: 63 IN | BODY MASS INDEX: 24.72 KG/M2 | WEIGHT: 139.5 LBS

## 2021-06-03 VITALS — BODY MASS INDEX: 24.8 KG/M2 | HEIGHT: 63 IN | WEIGHT: 140 LBS

## 2021-06-03 VITALS — BODY MASS INDEX: 24.63 KG/M2 | WEIGHT: 139 LBS | HEIGHT: 63 IN

## 2021-06-03 VITALS — BODY MASS INDEX: 25.08 KG/M2 | WEIGHT: 141.6 LBS

## 2021-06-03 VITALS — WEIGHT: 142 LBS

## 2021-06-03 NOTE — PROGRESS NOTES
Assessment/Plan:        Diagnoses and all orders for this visit:    Cervical pain (neck)  -     XR Cervical Spine 2 - 3 VWS; Future; Expected date: 11/18/2019  -     traMADol (ULTRAM) 50 mg tablet; Take 1 tablet (50 mg total) by mouth 2 (two) times a day.  Dispense: 20 tablet; Refill: 0  -     diazePAM (VALIUM) 10 MG tablet; Take 0.5-1 tablets (5-10 mg total) by mouth at bedtime as needed for muscle spasms.  Dispense: 30 tablet; Refill: 0    Need for tetanus, diphtheria, and acellular pertussis (Tdap) vaccine in patient of adolescent age or older  -     Tdap vaccine,  6yo or older,  IM    Travel advice encounter  -     Typhoid, Inactive, Inj    Visit for screening mammogram  -     Mammo Screening Bilateral; Future; Expected date: 11/18/2019    Other orders  -     Cancel: Influenza, Recombinant, Inj, Quadrivalent, PF, 18+YRS  I did order for an x-ray of the neck to help with the evaluation. I think that she might be having some degenerative disc since this has been going on intermittently for some time now.  I will await the radiologist read, but I did encourage her to consider physical therapy following the x-ray depending on the findings.  In the meantime we will try to control the pain.  I did give her a shot for typhoid because of the travel that she is going to have.  We also discussed health maintenance she will get her tetanus, she will also schedule her mammogram as well as other health maintenance needs.         Subjective:    Patient ID: Trenton Milian is a 59 y.o. female.    She comes in today with concerns of having neck pain.  Neck pain she still has been going on for some months, and had recently gotten worse over the past 3 weeks to 1 month now.  She did have a past history of having had some surgical procedures done to the with some transection of the lingual nerve and since then has had right-sided neck pain.  But noted that this current pain is different from that.    Neck Pain    This is a  recurrent problem. The problem occurs constantly. The problem has been gradually worsening. The pain is associated with nothing. The pain is present in the right side (Usually worse on the left side and intermittently right side but currently it is on the right side.). The quality of the pain is described as aching and burning. The pain is moderate. The symptoms are aggravated by bending, twisting, sneezing and position. Stiffness is present in the morning. Associated symptoms include tingling. Pertinent negatives include no chest pain, fever, headaches, numbness, pain with swallowing or photophobia. She has tried chiropractic manipulation and home exercises for the symptoms. The treatment provided mild relief.   She is also going to be traveling to [place] a week.  And the need to review her immunizations.  She is going to be there for 3 days only.    The following portions of the patient's history were reviewed and updated as appropriate: allergies, current medications, past family history, past medical history, past social history, past surgical history and problem list.    Review of Systems   Constitutional: Negative for activity change, fatigue and fever.   HENT: Negative.    Eyes: Negative for photophobia.   Respiratory: Negative.    Cardiovascular: Negative for chest pain.   Musculoskeletal: Positive for neck pain and neck stiffness. Negative for joint swelling.   Neurological: Positive for tingling. Negative for numbness and headaches.     Vitals:    11/18/19 0931   BP: 128/86   Pulse: 91   SpO2: 95%   Weight: 145 lb 6 oz (65.9 kg)             Objective:    Physical Exam   Constitutional: She appears well-developed and well-nourished.   There is mild distress due to pain.   HENT:   Mouth/Throat: Oropharynx is clear and moist.   Neck: No thyromegaly present.   Cardiovascular: Normal rate and regular rhythm.   Pulmonary/Chest: Effort normal and breath sounds normal.   Musculoskeletal:      Comments: There is a  tenderness noted on the right side of the neck, there is also mild muscle spasms on the right mid trapezius region as well.  She does have some limitations to the range of motion especially sideways and extension of flexion but negative axial loading.   Lymphadenopathy:     She has no cervical adenopathy.   Neurological: She is alert.

## 2021-06-03 NOTE — PATIENT INSTRUCTIONS - HE
~Please call Nurse Navigation line [phone] with any questions or concerns about your treatment plan, if symptoms worsen and you would like to be seen urgently, or if you have problems controlling bladder and bowel function.      [facility] Scheduling    Please call [phone] to schedule your image(s) (select option #1). There are 3 different locations, see below. You can do walk-in visits for xray only images if you want.     [facility]  [address] [place] [id number]    [facility]   [address] [address]    [place] [id number] [facility] [address]

## 2021-06-03 NOTE — PROGRESS NOTES
Assessment/Plan:        Diagnoses and all orders for this visit:    Vaginal pain  -     Ambulatory referral to Obstetrics / Gynecology    Vertigo  -     CT Head Without Contrast; Future; Expected date: 11/29/2019  The vaginal pain I am not really sure what is causing it but it does appear that the rugae is hypertrophic and it is painful and tender to touch and will need to be evaluated by an OB/GYN.  I did put in an order for that and I did refer her to see the OB/GYN for further evaluation.  I also think that because of an increase in the frequency of the vertigo and dizziness it is a good idea to have her do a CT scan to see if that will show any abnormality that needs to be that with.  She will make that appointment and in the CT scan done.  We will follow-up with her for that.      Subjective:    Patient ID: Trenton Milian is a 59 y.o. female.    59-year-old female who comes in today with concerns of having some vaginal pain.  Noted that the pain started about a week or so ago.  She also feels a small bump around the vaginal area.  She noted that it was very painful sexual intercourse.  She denies having any constitutional symptoms at this time.  Denies having any back pain no urinary symptoms.  She does not have any discharges.  She is postmenopausal.  She noted having the same symptoms about some years back out of the state and when she was seen at the emergency room they have referred her to see an OB/GYN who did an incision and drainage and was supposed to have sent it to the lab but she did not hear any what.  So she does not know what diagnosis they had what made her symptoms she had.  She is also having some lightheadedness.  She describes it from a sitting position and will feel like she is going to fall.  She does have some headaches mainly on the upper neck and she does have cervical radiculopathy with numbness on the hand.  When she has the lightheadedness of vertigo she does not fall to any specific  side.  She denied having any nausea does not have any visual problems with it.      The following portions of the patient's history were reviewed and updated as appropriate: allergies, current medications, past family history, past medical history, past social history, past surgical history and problem list.    Review of Systems   Constitutional: Negative.    HENT: Negative.    Gastrointestinal: Negative for abdominal pain, constipation, diarrhea, nausea and vomiting.   Genitourinary: Positive for dyspareunia and vaginal pain. Negative for difficulty urinating, dysuria, flank pain, hematuria and pelvic pain.   Musculoskeletal: Positive for neck pain. Negative for back pain and gait problem.   Neurological: Positive for dizziness. Negative for seizures and weakness.     Vitals:    11/29/19 0910   BP: 122/82   Pulse: 73   SpO2: 97%   Weight: 146 lb (66.2 kg)             Objective:    Physical Exam   Constitutional: She is oriented to person, place, and time. She appears well-developed and well-nourished. No distress.   She is worried looking but not distressed.   Neck: No thyromegaly present.   Cardiovascular: Intact distal pulses.   Pulmonary/Chest: Effort normal.   Genitourinary:    Genitourinary Comments: Vaginal exam did show normal external vagina internal vagina did show some hypertrophy of the vaginal rugae especially on the posterior vaginal area which was tender.  It does not look erythematous.  There is no discharges noted.     Musculoskeletal: Normal range of motion.   Lymphadenopathy:     She has no cervical adenopathy.   Neurological: She is alert and oriented to person, place, and time.   Psychiatric: She has a normal mood and affect.

## 2021-06-03 NOTE — PROGRESS NOTES
ASSESSMENT: Trenton Milian is a 59 y.o. female presents for consultation at the request of PCP Chacorta Layton MD, with past medical history significant for lupus, left leg DVT, esophageal stricture, right jaw osteomyelitis history with multiple mandible reconstruction surgery at [facility] in [place], left foot surgery, hysterectomy, , current smoker who presents today for new patient evaluation of :     -Chronic neck pain with progressive right cervical radiculitis with paresthesias x2 months, mild symptoms on the left.    -Progressive worsening balance problems over the last 1 month, question cervical myelopathy.    -Acute mild thoracic and lumbar spine pain x2 months as well.  No radicular pain however patient does notice weakness where her ankles give out right greater than left over the last 2 months.    -Chronic right jaw pain related to multiple reconstructive surgeries, osteomyelitis, multiple episodes of braces last completed 1 year ago.    NDI Score: 42    WHO 5: 16       Diagnoses and all orders for this visit:    Radiculitis of right cervical region  -     MR Cervical Spine Without Contrast; Future; Expected date: 2019  -     XR Cervical Spine Flexion Extension 2-3; Future; Expected date: 2019    Balance problems  -     MR Cervical Spine Without Contrast; Future; Expected date: 2019  -     XR Cervical Spine Flexion Extension 2-3; Future; Expected date: 2019    Bilateral low back pain without sciatica, unspecified chronicity  -     MR Cervical Spine Without Contrast; Future; Expected date: 2019    Chronic neck pain  -     MR Cervical Spine Without Contrast; Future; Expected date: 2019    Chronic jaw pain  -     MR Cervical Spine Without Contrast; Future; Expected date: 2019    History of mandibular surgery  -     MR Cervical Spine Without Contrast; Future; Expected date: 2019    Spondylolisthesis of cervical region  -     XR  Cervical Spine Flexion Extension 2-3; Future; Expected date: 11/27/2019       PLAN:  Reviewed spine anatomy and disease process. Discussed diagnosis and treatment options with the patient today. A shared decision making model was used. The patient's values and choices were respected. The following represents what was discussed and decided upon by the provider and the patient.     -DIAGNOSTIC TESTS:  Images were personally reviewed and interpreted and explained to patient today using spine model.   --Ordered cervical spine MRI and cervical flexion extension x-ray to further evaluate radiculitis with paresthesias and progressive balance problems again concerning for myelopathy.  --Consider lumbar spine/thoracic spine x-rays if symptoms are not improving pending cervical MRI review.  --Cervical spine x-ray 11/18/2019 shows 1 mm spondylolisthesis C3-4 and C4-5.  Mild disc height loss, no high-grade arthropathy.    -PHYSICAL THERAPY: If more mild findings on MRI consider physical therapy options which she would be interested in.  Discussed the importance of core strengthening, ROM, stretching exercises with the patient and how each of these entities is important in decreasing pain.  Explained to the patient that the purpose of physical therapy is to teach the patient a home exercise program.  These exercises need to be performed every day in order to decrease pain and prevent future occurrences of pain.  Likened it to brushing one's teeth.      -MEDICATIONS: No change in medications advised patient to continue gabapentin, tramadol which she takes only as needed and Valium which is ongoing medication for her jaw numbness and tingling/pain.  -Potentially could increase gabapentin down the road if symptoms are not improving.  Discussed multiple medication options today with patient. Discussed risks, side effects, and proper use of medications. Patient verbalized understanding.    -INTERVENTIONS: Could consider cervical  spine injections pending MRI review.  Discussed risks and benefits of injections with patient today.    -PATIENT EDUCATION: 45 minutes of total visit time was spent face to face with the patient today, greater than 50% of total time spent with patient was spent on counseling, education, and coordinating care.   -10 minutes spent outside of visit time, non-face-to-face time, reviewing chart.    -FOLLOW-UP:   Follow-up after obtaining MRI images to review results and ongoing plan.    Advised patient to call the [facility] if symptoms worsen or you have problems controlling bladder and bowel function.   ______________________________________________________________________    SUBJECTIVE:   Trenton Milian  is a 59 y.o. female who presents today for new patient evaluation of neck pain that is chronic in nature however typically more mild and tolerable.  Patient reports that in the last 2 months she has had progressive right neck and right arm symptoms specifically in the biceps, trapezius region with numbness and tingling into the right hand as well as weakness right greater than left upper extremities where she feels her  strength is not as strong. Current neck pain is 3/10 however worsening at times.     Patient reports over the last 2 months she is felt quite unbalanced and has had multiple episodes where she felt she was in a fall however was always able to catch herself therefore denies any trips or falls.    Patient denies bowel or bladder loss control, denies saddle anesthesia.  Patient does report that since her neck pain is been more progressive she is having midline thoracic and lumbar spine pain as well, denies any lower extremity pain however.  She does report that her neck pain is most intense.     Patient does report long-standing history with right jaw pain related to reconstructive surgery, jaw infections and multiple bouts of braces.  Patient reports the last braces were removed 1 year ago which  did realign her jaw and gave her some benefit with her right jaw pain however she does have chronic nerve damage with numbness in her  tongue, teeth on the right lower jaw as well as decreased sensation to the skin on her lower jaw and upper anterior neck on the right.    -Treatment to Date: No prior spinal surgery or spinal injection.  No prior physical therapy for neck pain.  Chiropractic treatments long-term for neck and back pain with benefit.    -Medications:  Gabapentin 30 mg for restless leg  Tramadol prescribed by PCP only as needed.  Valium 10 mg long-term medication for chronic right jaw pain.    Current Outpatient Medications on File Prior to Encounter   Medication Sig Dispense Refill     diazePAM (VALIUM) 10 MG tablet Take 0.5-1 tablets (5-10 mg total) by mouth at bedtime as needed for muscle spasms. 30 tablet 0     gabapentin (NEURONTIN) 300 MG capsule 300 mg.              traMADol (ULTRAM) 50 mg tablet Take 1 tablet (50 mg total) by mouth 2 (two) times a day. 20 tablet 0     calcium carbonate-vitamin D3 (CALTRATE 600 PLUS D3) 600 mg(1,500mg) -400 unit per tablet        CERAVE Crea cream        cetirizine (ZYRTEC) 10 MG tablet Take 10 mg by mouth daily.              MUCINEX 600 mg 12 hr tablet        omeprazole (PRILOSEC) 20 MG capsule Take 20 mg by mouth daily before breakfast.              PROAIR HFA 90 mcg/actuation inhaler        sertraline (ZOLOFT) 25 MG tablet        VOLTAREN 1 % Gel        XARELTO 20 mg tablet 20 mg once.              No current facility-administered medications on file prior to encounter.        Allergies   Allergen Reactions     Augmentin [Amoxicillin-Pot Clavulanate]      itchy     Fentanyl      itchy     Penicillins      itchy     Septra [Sulfamethoxazole-Trimethoprim]      itchy     Tramadol      itchy     Zyban [Bupropion Hcl]      Personality Change       Past Medical History:   Diagnosis Date     Asthma      Depression      Environmental allergies      Esophageal  stricture 09/15/2014    EGD done at [facility].     Gastric ulcer 09/15/2014    EGD done at [facility].     GERD (gastroesophageal reflux disease)      Left leg DVT (H)      Lupus (H)         Patient Active Problem List   Diagnosis     Left leg DVT (H)     Lupus anticoagulant disorder (H)     Esophageal stricture       Past Surgical History:   Procedure Laterality Date     ABDOMINOPLASTY  1996     APPENDECTOMY  1978      SECTION       ESOPHAGOGASTRODUODENOSCOPY  09/15/2014    Esophageal stricture and gastric ulceration.     EXCISION / CURETTAGE BONE CYST PHALANGES OF FOOT Left 2008     MANDIBLE SURGERY  2007    lower jaw advancement. Left over numbness of tongue and gums.Totally 5 surgeries.     OVARIAN CYST SURGERY Right 1977     SINUS SURGERY Bilateral      TOTAL ABDOMINAL HYSTERECTOMY W/ BILATERAL SALPINGOOPHORECTOMY      for ovarian infection.       Family History   Problem Relation Age of Onset     Hypertension Mother      Thyroid disease Mother      Arthritis Mother      Hypertension Father      Heart disease Father         Valve replacement ,bypass     Arthritis Father      Gout Son      Breast cancer Maternal Grandmother      Other Maternal Grandmother         some sort blood clot.     Breast cancer Paternal Aunt      Anxiety disorder Brother      Obesity Brother      Gout Son        Reviewed past medical, surgical, and family history with patient found on new patient intake packet located in EMR [employer] tab.     SOCIAL HX: Patient is , works as a volunteer.  Patient does exercise on a regular basis.  Patient does smoke tobacco, denies alcohol use, denies history being heavy drinker, denies recreational drug use.    ROS: Positive for joint pain, muscle pain, muscle fatigue, itching, imbalance, dizziness, excessive tiredness, depression, abdominal pain, constipation, reflux, changes in vision, ringing in ears, headache intermittent long-term, weight gain.   "Specifically negative for bowel/bladder dysfunction, foot drop, fevers, chills, appetite changes, nausea/vomiting, unexplained weight loss. Otherwise 13 systems reviewed are negative. Please see the patient's intake questionnaire from today for details.    OBJECTIVE:  /69 (Patient Site: Left Arm, Patient Position: Sitting)   Pulse (!) 110   Ht 5' 3\" (1.6 m)   Wt 142 lb (64.4 kg)   LMP 06/27/1989   SpO2 95%   BMI 25.15 kg/m      PHYSICAL EXAMINATION:  --CONSTITUTIONAL: Vital signs as above. No acute distress. The patient is well nourished and well groomed.  --PSYCHIATRIC: The patient is awake, alert, oriented to person, place, time and answering questions appropriately with clear speech. Appropriate mood and affect   --HEENT: Sclera are non-injected. Extraocular muscles are intact. Thyroid moves easily upon swallowing.  Moist oral mucosa.  --SKIN: Skin over the face, bilateral upper extremities, and posterior torso is clean, dry, intact without rashes.  --RESPIRATORY: Normal rhythm and effort. No abnormal accessory muscle breathing patterns noted.   --GROSS MOTOR: Easily arises from a seated position. Toe walking and heel walking are normal.    --CERVICAL SPINE: Inspection reveals no evidence of deformity. Range of motion not limited in cervical extension greater than flexion and right greater than left lateral rotation.  Tenderness to palpation midline cervical spine as well as right paraspinal muscles.  Spurling maneuver positive on the right.  --SHOULDERS: Full range of motion bilaterally: abduction, flexion, cross chest movement internal/external rotation. Negative empty can.  --UPPER EXTREMITY MOTOR TESTING:  Wrist flexion left 5/5, right 5/5  Wrist extension left 5/5, right 5/5  Pronators left 5/5, right 5/5  Biceps left 5/5, right 5/5   Triceps left 5/5, right 5/5   Shoulder abduction left 5/5, right 5/5   left 5/5, right 5/5  --NEUROLOGIC: CN III-XII are grossly intact. 2/4 symmetric biceps, " brachioradialis, triceps reflexes bilaterally. Sensation to upper extremities is intact.  Negative Richards's bilaterally.    --VASCULAR: 2/4 radial pulses bilaterally. Warm upper limbs bilaterally. Capillary refill in the upper extremities is less than 1 second.    --STANDING EXAMINATION:  Normal lumbar lordosis noted, no lateral shift.  --MUSCULOSKELETAL: Lumbar spine inspection reveals no evidence of deformity. Range of motion is not limited in lumbar flexion, extension, lateral rotation. No tenderness to palpation thoracic or lumbar spine. Straight leg raising in the seated position is negative to radicular pain. Sciatic notch non-tender.  --SACROILIAC JOINT: One Finger point test negative.  --GROSS MOTOR: Gait is non-antalgic. Easily arises from a seated position.   --LOWER EXTREMITY MOTOR TESTING:  Plantar flexion left 5/5, right 5/5   Dorsiflexion left 5/5, right 5/5   Great toe MTP extension left 5/5, right 5/5  Knee flexion left 5/5, right 5/5  Knee extension left 5/5, right 5/5   Hip flexion left 5/5, right 5/5  Hip abduction left 5/5, right 5/5  Hip adduction left 5/5, right 5/5   --HIPS: Full range of motion bilaterally.   --NEUROLOGICAL:  2/4 patellar, medial hamstring, and achilles reflexes bilaterally.  Sensation to light touch is intact in the bilateral L4, L5, and S1 dermatomes. Babinski is negative. No clonus.  --VASCULAR:  2/4 dorsalis pedis and posterior tibialsi pulses bilaterally.  Bilateral lower extremities are warm.  There is no pitting edema of the bilateral lower extremities    RESULTS: Prior medical records from [facility] 6/27/2019 to current and care everywhere were reviewed today.     Imaging:  Cervical spine Imaging was personally reviewed and interpreted today. The images were shown to the patient and the findings were explained using a spine model.      Xr Cervical Spine 2 - 3 Vws  Result Date: 11/19/2019  INDICATION: Cervicalgia COMPARISON: None.   Normal craniocervical and  atlantoaxial alignment. Straightening of cervical lordosis. 1 mm retrolisthesis at C3-C4 and 1 mm anterolisthesis at C4-C5, likely degenerative. No displaced fracture. Vertebral body heights are maintained. No aggressive sclerotic or lytic osseous lesion. Mild disc height loss without significant degenerative endplate changes at C3-C4, C5-C6, and C6-C7. No significant facet arthropathy. Soft tissues and included lungs are normal.

## 2021-06-04 VITALS — BODY MASS INDEX: 25.16 KG/M2 | HEIGHT: 63 IN | WEIGHT: 142 LBS

## 2021-06-04 VITALS
SYSTOLIC BLOOD PRESSURE: 122 MMHG | OXYGEN SATURATION: 97 % | BODY MASS INDEX: 25.86 KG/M2 | DIASTOLIC BLOOD PRESSURE: 82 MMHG | WEIGHT: 146 LBS | HEART RATE: 73 BPM

## 2021-06-04 NOTE — TELEPHONE ENCOUNTER
----- Message from Nneka Bella CNP sent at 12/4/2019 10:45 AM CST -----  Please call patient and notify her that I did review her cervical spine imaging.  Cervical spine x-ray with no instability.  Cervical spine MRI shows mild disc height loss with disc protrusion multiple levels with mild nerve compression bilaterally at C3-4, moderate right at C5-6.  Given the mild nerve compression would recommend starting with physical therapy for core strengthening the cervical spine and cervical nerve glides/nerve flossing exercises to see if we can calm down her symptoms.  If she got no relief with physical therapy injections would be an option as well.

## 2021-06-04 NOTE — PROGRESS NOTES
[facility]   Initial Evaluation    Patient Name: Trenton Milian  Date of evaluation: 12/13/2019  Referral Diagnosis: Radiculitis of right cervical region, Back Pain, Balance Issues and TMJ Pain  Referring provider: Nneka Bella C*  Visit Diagnosis:     ICD-10-CM    1. Cervical pain M54.2    2. Chronic bilateral low back pain without sciatica M54.5     G89.29    3. Bilateral temporomandibular joint pain M26.623    4. Generalized muscle weakness M62.81        Assessment:       Patient presents with chronic pain in multiple body parts, with her cervical region the worst.  She demonstrates pain to palpation, weakness and decreased AROM.  She has a decreased NDI score  Functional limitations are described as occurring with: stairs, turning head, sleep.  Feel she will benefit from physical therapy to work on limitations through stretches, education, and strengthening.  Pt. is appropriate for skilled PT intervention as outlined in the Plan of Care (POC).    Goals:  Pt. will be independent with home exercise program in : 6 weeks    Pt will: Able to sleep through the night without waking due to pain within 12 weeks  Pt will: Able to turn head easier to drive as motion improves and pain decreases 0-2/10 within 12 weeks  Pt will: Able to go up and down stairs with more confidence as LE strength improves within 12 weeks      Patient's expectations/goals are realistic.    Barriers to Learning or Achieving Goals:  Past Medical History:   Diagnosis Date     Asthma      Depression      Environmental allergies      Esophageal stricture 09/15/2014    EGD done at [facility].     Gastric ulcer 09/15/2014    EGD done at [facility].     GERD (gastroesophageal reflux disease)      Left leg DVT (H)      Lupus (H)           Plan / Patient Instructions:        Plan of Care:   Authorization / Certification Start Date: 12/13/19  Authorization / Certification Number of Visits: 12  Communication  with: Referral Source  Patient Related Instruction: Nature of Condition;Treatment plan and rationale;Self Care instruction;Basis of treatment;Body mechanics;Posture  Times per Week: 1-2  Number of Weeks: 8-12  Number of Visits: 8-12  Therapeutic Exercise: ROM;Stretching;Strengthening  Neuromuscular Reeducation: kinesio tape;posture;core  Manual Therapy: soft tissue mobilization;myofascial release;joint mobilization  Modalities: electrical stimulation;TENS      Plan for next visit:   Intro stretches for neck, back, TMJ  KT if helpful  Manual therapy cervical region  Core engagement  edu on posture and body mechanics       Subjective:           History of Present Illness:    Trenton is a 59 y.o. female who presents to therapy today with complaints of pain in multiple areas. She is most concerned with her spine and balance issues.  The neck is most concerning to her.  She has had neck pain for many years on and off.  In September the pain returned in her neck with an insidious onset and has not decreased.  Within the past week it has progressed down her spine.  She has intermittent numbness in her arms.  She will get occasional locking of her right foot on stairs.  Her legs do feel heavy at times and feels this may be what could cause her to fall at times.  She has had multiple TMJ surgeries and problems and feels no one has ever shown her how to help manage the symptoms.       Pain Ratin}  Pain rating at best: 2  Pain rating at worst: 7  Pain description: sharp, soreness, tingling and stiffness, throbbing    Functional limitations are described as occurring with:   stairs, turning head, sleep,     Patient reports benefit from:  laying on hard floor, rolling on foam roller, and bringing knees to chest       Objective:      Patient Outcome Measures :    Neck Disability Score in %: 30     Scores range from 0-100%, where a score of 0% represents minimal pain and maximal function. The minmal clinically important  "difference is a score reduction of 10%.    Examination  1. Cervical pain     2. Chronic bilateral low back pain without sciatica     3. Bilateral temporomandibular joint pain     4. Generalized muscle weakness       Involved side: Bilateral  Posture Observation:      General standing posture is normal.    Trunk AROM WNL, patient felt \"relief\" with motion  1 leg stance: 20+ seconds, instability noted with both LE  Squat: very wide base of support with moderate toe out, knees adduct  Cervical AROM: Pain with cervical extension                             25% limited with rotation left and right   Strength:   UE 4 to 4+/5 throughout shoulder and elbow  LE strength: 4+/5 throughout  Palpation:     Pain over bilateral cervical extensors, SO, UT, LS all R>L                     Bilateral thoracic paraspinals, rhomboids R>L                     Bilateral lumbar paraspinals  Tests;    Cervical compression and distraction + right    Straight leg raise negative bilaterally    Slump test negative bilaterally    TMJ ROM  Motion(normal values) mm Pain Comment (noise/ deviation)   Active Incisal opening (40-60 mm) 46  Pain on right and in right ear   Right laterotrusion (7-12mm) 12     Left laterotrusion(7-12 mm) 10  Pain on right   Protrusion (8-12mm)      Translation: Normal, no popping or clicking with opening or closing   Treatment Today     TREATMENT MINUTES COMMENTS   Evaluation 35 Neck, back, TMJ   Self-care/ Home management     Manual therapy     Neuromuscular Re-education 8+2 KT  2\" , 4 blocks, paper off tension, distal to proximal, cervical extensors   Therapeutic Activity     Therapeutic Exercises 15 Many questions   Gait training     Modality__________________                Total 60    Blank areas are intentional and mean the treatment did not include these items.     PT Evaluation Code: (Please list factors)  Patient History/Comorbidities:   Past Medical History:   Diagnosis Date     Asthma      Depression      " Environmental allergies      Esophageal stricture 09/15/2014    EGD done at [facility].     Gastric ulcer 09/15/2014    EGD done at [facility].     GERD (gastroesophageal reflux disease)      Left leg DVT (H)      Lupus (H)      Examination: pain with palpation, weakness,   Clinical Presentation: stable  Clinical Decision Making: low    Patient History/  Comorbidities Examination  (body structures and functions, activity limitations, and/or participation restrictions) Clinical Presentation Clinical Decision Making (Complexity)   No documented Comorbidities or personal factors 1-2 Elements Stable and/or uncomplicated Low   1-2 documented comorbidities or personal factor 3 Elements Evolving clinical presentation with changing characteristics Moderate   3-4 documented comorbidities or personal factors 4 or more Unstable and unpredictable High            Hortensia Solano, PT  12/13/2019  6:50 AM

## 2021-06-04 NOTE — PROGRESS NOTES
[facility] Rehabilitation Daily Progress     Patient Name: Trenton TAM Votel  Date: 2019  Visit #: 3/10-19   Referral Diagnosis: Radiculitis of right cervical region  Referring provider: Nneka Bella C*  Visit Diagnosis:     ICD-10-CM    1. Cervical pain M54.2    2. Chronic bilateral low back pain without sciatica M54.5     G89.29    3. Bilateral temporomandibular joint pain M26.623    4. Generalized muscle weakness M62.81          Assessment:   Patient felt the TMJ education was very helpful and will be mindful of her tongue position.  She usually keeps her tongue between her teeth.  She is hoping if it is in the rest position that it will relax and not be as painful.     Patient is benefitting from skilled physical therapy and is making steady progress toward functional goals.  Patient is appropriate to continue with skilled physical therapy intervention, as indicated by initial plan of care.    Goal Status:  Pt. will be independent with home exercise program in : 6 weeks    Pt will: Able to sleep through the night without waking due to pain within 12 weeks  Pt will: Able to turn head easier to drive as motion improves and pain decreases 0-2/10 within 12 weeks  Pt will: Able to go up and down stairs with more confidence as LE strength improves within 12 weeks      Plan / Patient Education:     Review stretches prn  KT if helpful  Manual therapy cervical region  Core engagement  edu on posture and body mechanics      Subjective:     Pain Ratin neck                       2 in low back    Think the exercises are going well.  I do them frequently.        Objective:     Patient Active Problem List   Diagnosis     Left leg DVT (H)     Lupus anticoagulant disorder (H)     Esophageal stricture     Exercises:  Exercise #1: chin tuck and scapular retration  10 seconds x 10 reps  Comment #1: UT snd LS, supine thoracic towel roll stretch   hold 30 seconds x 1-3 reps  Exercise #2:  "already does thoracic towel roll stretch over foam roller and corner pec stretch, child's pose, LTR, SKC, cat/cow  Comment #2:    Added:  midback rotation  Exercise #3: TMJ thythmic stabilization      Treatment Today     TREATMENT MINUTES COMMENTS   Evaluation     Self-care/ Home management 20 TMJ handouts issued-posture, head position, tongue position, habits to avoid   Manual therapy     Neuromuscular Re-education Not today-skin was itchy. KT  2\" , 6 blocks, paper off tension, distal to proximal, cervical and upper thoracic extensors   Therapeutic Activity     Therapeutic Exercises 10 See flow sheet or above   Gait training     Modality__________________                Total 30    Blank areas are intentional and mean the treatment did not include these items.       Hortensia Solano, PT  12/18/2019    "

## 2021-06-04 NOTE — TELEPHONE ENCOUNTER
Phone call to patient to review results and provider's recommendations. Results given and explained. Discussed the recommendation to start PT for core strengthening and nerve glides. Stated understanding. She will await a call to set up PT.

## 2021-06-04 NOTE — PROGRESS NOTES
[facility] Rehabilitation Daily Progress     Patient Name: Trenton TAM Votel  Date: 2019  Visit #: 2/10-19   Referral Diagnosis: Radiculitis of right cervical region  Referring provider: Nneka Bella C*  Visit Diagnosis:     ICD-10-CM    1. Cervical pain M54.2    2. Chronic bilateral low back pain without sciatica M54.5     G89.29    3. Bilateral temporomandibular joint pain M26.623    4. Generalized muscle weakness M62.81          Assessment:   Patient felt the KT was very helpful in decreasing pain levels and found it easier to do all tasks with the KT on.  Re-applied today, and went more distal, to see if that helps the thoracic region as well.  Patient is benefitting from skilled physical therapy and is making steady progress toward functional goals.  Patient is appropriate to continue with skilled physical therapy intervention, as indicated by initial plan of care.    Goal Status:  Pt. will be independent with home exercise program in : 6 weeks    Pt will: Able to sleep through the night without waking due to pain within 12 weeks  Pt will: Able to turn head easier to drive as motion improves and pain decreases 0-2/10 within 12 weeks  Pt will: Able to go up and down stairs with more confidence as LE strength improves within 12 weeks      Plan / Patient Education:       Intro/review stretches for neck, back, TMJ  KT if helpful  Manual therapy cervical region  Core engagement  edu on posture and body mechanics      Subjective:     Pain Ratin neck                       2 in low back    Loved the KT!  My neck felt much better when it was on.  It did feel like the pain went below where the tape was.       Objective:     Patient Active Problem List   Diagnosis     Left leg DVT (H)     Lupus anticoagulant disorder (H)     Esophageal stricture     Exercises:  Exercise #1: chin tuck and scapular retration  10 seconds x 0 reps  Comment #1: UT snd LS, supine thoracic towel roll  "stretch   hold 30 seconds x 1-3 reps      Treatment Today   Short appt secondary to mixed up appt time  TREATMENT MINUTES COMMENTS   Evaluation     Self-care/ Home management     Manual therapy     Neuromuscular Re-education 8 KT  2\" , 6 blocks, paper off tension, distal to proximal, cervical and upper thoracic extensors   Therapeutic Activity     Therapeutic Exercises 12 See flow sheet or above   Gait training     Modality__________________                Total 20    Blank areas are intentional and mean the treatment did not include these items.       Hortensia Solano, PT  12/16/2019    "

## 2021-06-05 VITALS
BODY MASS INDEX: 25.46 KG/M2 | WEIGHT: 143.7 LBS | HEART RATE: 90 BPM | DIASTOLIC BLOOD PRESSURE: 70 MMHG | SYSTOLIC BLOOD PRESSURE: 110 MMHG | HEIGHT: 63 IN | OXYGEN SATURATION: 97 % | TEMPERATURE: 98.4 F

## 2021-06-05 VITALS
RESPIRATION RATE: 14 BRPM | OXYGEN SATURATION: 98 % | HEART RATE: 103 BPM | BODY MASS INDEX: 25.11 KG/M2 | SYSTOLIC BLOOD PRESSURE: 102 MMHG | DIASTOLIC BLOOD PRESSURE: 69 MMHG | TEMPERATURE: 98.2 F | WEIGHT: 144 LBS

## 2021-06-05 VITALS
SYSTOLIC BLOOD PRESSURE: 107 MMHG | BODY MASS INDEX: 18.48 KG/M2 | TEMPERATURE: 98.2 F | DIASTOLIC BLOOD PRESSURE: 71 MMHG | HEART RATE: 85 BPM | WEIGHT: 104.3 LBS | OXYGEN SATURATION: 96 % | HEIGHT: 63 IN

## 2021-06-05 VITALS
WEIGHT: 143 LBS | DIASTOLIC BLOOD PRESSURE: 62 MMHG | SYSTOLIC BLOOD PRESSURE: 100 MMHG | RESPIRATION RATE: 19 BRPM | HEART RATE: 93 BPM | OXYGEN SATURATION: 94 % | TEMPERATURE: 98.5 F | BODY MASS INDEX: 25.13 KG/M2

## 2021-06-05 VITALS
HEART RATE: 92 BPM | SYSTOLIC BLOOD PRESSURE: 107 MMHG | OXYGEN SATURATION: 97 % | BODY MASS INDEX: 24.22 KG/M2 | HEIGHT: 64 IN | DIASTOLIC BLOOD PRESSURE: 76 MMHG | WEIGHT: 141.9 LBS | TEMPERATURE: 98.3 F

## 2021-06-05 VITALS
SYSTOLIC BLOOD PRESSURE: 112 MMHG | BODY MASS INDEX: 24.64 KG/M2 | DIASTOLIC BLOOD PRESSURE: 66 MMHG | WEIGHT: 141.31 LBS | OXYGEN SATURATION: 99 % | HEART RATE: 93 BPM

## 2021-06-05 VITALS
WEIGHT: 147.2 LBS | SYSTOLIC BLOOD PRESSURE: 122 MMHG | BODY MASS INDEX: 25.87 KG/M2 | HEART RATE: 101 BPM | DIASTOLIC BLOOD PRESSURE: 74 MMHG

## 2021-06-05 VITALS
HEART RATE: 105 BPM | DIASTOLIC BLOOD PRESSURE: 60 MMHG | HEIGHT: 64 IN | SYSTOLIC BLOOD PRESSURE: 110 MMHG | TEMPERATURE: 98.4 F | BODY MASS INDEX: 24.01 KG/M2 | WEIGHT: 140.6 LBS

## 2021-06-05 NOTE — PROGRESS NOTES
[facility] Discharge Summary  Patient Name: Trenton Milian  Date: 2/24/2020  Referral Diagnosis: TMJ, Cervical, Back Pain  Referring provider: Nneka Bella C*  Visit Diagnosis:   1. Cervical pain     2. Chronic bilateral low back pain without sciatica     3. Bilateral temporomandibular joint pain     4. Generalized muscle weakness         Goals:  Pt. will be independent with home exercise program in : 6 weeks    Pt will: Able to sleep through the night without waking due to pain within 12 weeks  Pt will: Able to turn head easier to drive as motion improves and pain decreases 0-2/10 within 12 weeks  Pt will: Able to go up and down stairs with more confidence as LE strength improves within 12 weeks      Patient was seen for 6 visits from 12/13/19 to 1/24/20 with 1 missed appointments.  Patient progressed very well with her program and progress and feel goals were met.    Therapy will be discontinued at this time.  The patient will need a new referral to resume.    Thank you for your referral.  Hortensia Solano, PT  2/24/2020  8:27 AM    [facility] Rehabilitation Daily Progress     Patient Name: Trenton Milian  Date: 1/24/2020  Visit #: 6/10-12 12/13/19   Referral Diagnosis: Radiculitis of right cervical region  Referring provider: Nneka Bella C*  Visit Diagnosis:     ICD-10-CM    1. Cervical pain M54.2    2. Chronic bilateral low back pain without sciatica M54.5     G89.29    3. Bilateral temporomandibular joint pain M26.623    4. Generalized muscle weakness M62.81          Assessment:   Patient feels she is making good progress and is in less pain so able to do more.  She feels stronger with all activities as well.   She felt the education was very helpful and will look through the sheet and make changes as she needs to.  Patient is benefitting from skilled physical therapy and is making steady progress toward functional goals.  Patient is appropriate to continue with  skilled physical therapy intervention, as indicated by initial plan of care.    Goal Status: ongoing  Pt. will be independent with home exercise program in : 6 weeks    Pt will: Able to sleep through the night without waking due to pain within 12 weeks  Pt will: Able to turn head easier to drive as motion improves and pain decreases 0-2/10 within 12 weeks  Pt will: Able to go up and down stairs with more confidence as LE strength improves within 12 weeks      Plan / Patient Education:     Possible discharge if continues to feel good  Questions on body mechanics?    Subjective:     Pain Ratin neck                       4 in low back but depends what I am doing    I shoveled and it felt good.  I did not get sore at all  I don't wake in pain anymore.  I did a lot of planks this AM.  I tweaked my knee so that is a little sore.    Objective:     Patient Active Problem List   Diagnosis     Left leg DVT (H)     Lupus anticoagulant disorder (H)     Esophageal stricture     Exercises:  Exercise #1: chin tuck and scapular retration  10 seconds x 10 reps  Comment #1: UT snd LS, supine thoracic towel roll stretch   hold 30 seconds x 1-3 reps  Exercise #2: already does thoracic towel roll stretch over foam roller and corner pec stretch, child's pose, LTR, SKC, cat/cow  Comment #2:    Added:  midback rotation  Exercise #3: TMJ thythmic stabilization  Comment #3: sitting hip isometric adduction squeezing pillow  hold 10 seconds x 6-12 reps  Exercise #4: bridges   hold 10 seconds X 6-12 reps  Comment #4: clamshells  x 20  Exercise #6: low abs:  supine hooklying, toe tapping x 20  Comment #6: all 4's  alt arm and leg  hold 10 seconds X 3-6 reps  Exercise #7: green tband:  shoulder extension, scapular retraction, shoulder diagonal  X 20  Comment #7: 1 leg stance, heel and toe raises, towel curls  Exercise #8: wall atif  X 20  Comment #8: reverse wall push-up   hold 10 seconds X -12 reps    Body mechanics:  Pivot, push, pull,  overhead, vacuum, sweep, shovel, golfer's lift, power position  Treatment Today     TREATMENT MINUTES COMMENTS   Evaluation     Self-care/ Home management 10 Body mechanics-handout issued  See above   Manual therapy     Neuromuscular Re-education     Therapeutic Activity     Therapeutic Exercises 15 See above or flow sheet     Gait training     Modality__________________                Total 25    Blank areas are intentional and mean the treatment did not include these items.       Hortensia Solano, PT  1/24/2020

## 2021-06-05 NOTE — PROGRESS NOTES
[facility] Rehabilitation Daily Progress     Patient Name: Trenton TAM Votel  Date: 2020  Visit #: 5/10-19   Referral Diagnosis: Radiculitis of right cervical region  Referring provider: Nneka Bella C*  Visit Diagnosis:     ICD-10-CM    1. Cervical pain M54.2    2. Chronic bilateral low back pain without sciatica M54.5     G89.29    3. Bilateral temporomandibular joint pain M26.623    4. Generalized muscle weakness M62.81          Assessment:   Patient wanted ankle exercises for stability to help with overall strengthening so issued a few exercises  She felt the midback ones will be helpful but may switch them to weights at home versus the band.    Patient is benefitting from skilled physical therapy and is making steady progress toward functional goals.  Patient is appropriate to continue with skilled physical therapy intervention, as indicated by initial plan of care.    Goal Status: ongoing  Pt. will be independent with home exercise program in : 6 weeks    Pt will: Able to sleep through the night without waking due to pain within 12 weeks  Pt will: Able to turn head easier to drive as motion improves and pain decreases 0-2/10 within 12 weeks  Pt will: Able to go up and down stairs with more confidence as LE strength improves within 12 weeks      Plan / Patient Education:     KT if helpful  edu on posture and body mechanics  h-continued to add strengthening    Subjective:     Pain Ratin neck                       4 in low back but depends what I am doing    I did triceps dips this weekend and it irritated my neck.  My  put KT on and it felt much better  I feel I am doing well managing some of the symptoms    Objective:     Patient Active Problem List   Diagnosis     Left leg DVT (H)     Lupus anticoagulant disorder (H)     Esophageal stricture     Exercises:  Exercise #1: chin tuck and scapular retration  10 seconds x 10 reps  Comment #1: UT snd LS, supine thoracic  towel roll stretch   hold 30 seconds x 1-3 reps  Exercise #2: already does thoracic towel roll stretch over foam roller and corner pec stretch, child's pose, LTR, SKC, cat/cow  Comment #2:    Added:  midback rotation  Exercise #3: TMJ thythmic stabilization  Comment #3: sitting hip isometric adduction squeezing pillow  hold 10 seconds x 6-12 reps  Exercise #4: bridges   hold 10 seconds X 6-12 reps  Comment #4: clamshells  x 20  Exercise #6: low abs:  supine hooklying, toe tapping x 20  Comment #6: all 4's  alt arm and leg  hold 10 seconds X 3-6 reps  Exercise #7: green tband:  shoulder extension, scapular retraction, shoulder diagonal  X 20  Comment #7: 1 leg stance, heel and toe raises, towel curls      Treatment Today     TREATMENT MINUTES COMMENTS   Evaluation     Self-care/ Home management     Manual therapy     Neuromuscular Re-education     Therapeutic Activity     Therapeutic Exercises 28 See above or flow sheet  Green tband issued   Gait training     Modality__________________                Total 28    Blank areas are intentional and mean the treatment did not include these items.       Hortensia Solano, PT  1/20/2020

## 2021-06-05 NOTE — PROGRESS NOTES
[facility] Rehabilitation Daily Progress     Patient Name: Trenton TAM Votel  Date: 2020  Visit #: 4/10-19   Referral Diagnosis: Radiculitis of right cervical region  Referring provider: Nneka Bella C*  Visit Diagnosis:     ICD-10-CM    1. Cervical pain M54.2    2. Chronic bilateral low back pain without sciatica M54.5     G89.29    3. Bilateral temporomandibular joint pain M26.623    4. Generalized muscle weakness M62.81          Assessment:   Patient felt the core exercises were helpful for her low back.  She can feel how strengthening will help with stabilizing in this area to decrease pain    Patient is benefitting from skilled physical therapy and is making steady progress toward functional goals.  Patient is appropriate to continue with skilled physical therapy intervention, as indicated by initial plan of care.    Goal Status: ongoing  Pt. will be independent with home exercise program in : 6 weeks    Pt will: Able to sleep through the night without waking due to pain within 12 weeks  Pt will: Able to turn head easier to drive as motion improves and pain decreases 0-2/10 within 12 weeks  Pt will: Able to go up and down stairs with more confidence as LE strength improves within 12 weeks      Plan / Patient Education:     KT if helpful  edu on posture and body mechanics  Review HEP  Add midback strength    Subjective:     Pain Ratin neck                       4 in low back but depends what I am doing    My neck is much better but my low back hurts.   The TMJ is doing good.  I think I am managing with the exercises.   Think my bed is horrible and that is what is causing some of my back pain.      Objective:     Patient Active Problem List   Diagnosis     Left leg DVT (H)     Lupus anticoagulant disorder (H)     Esophageal stricture     Exercises:  Exercise #1: chin tuck and scapular retration  10 seconds x 10 reps  Comment #1: UT snd LS, supine thoracic towel roll stretch    hold 30 seconds x 1-3 reps  Exercise #2: already does thoracic towel roll stretch over foam roller and corner pec stretch, child's pose, LTR, SKC, cat/cow  Comment #2:    Added:  midback rotation  Exercise #3: TMJ thythmic stabilization  Comment #3: sitting hip isometric adduction squeezing pillow  hold 10 seconds x 6-12 reps  Exercise #4: bridges   hold 10 seconds X 6-12 reps  Comment #4: clamshells  x 20  Exercise #6: low abs:  supine hooklying, toe tapping x 20  Comment #6: all 4's  alt arm and leg  hold 10 seconds X 3-6 reps      Treatment Today     TREATMENT MINUTES COMMENTS   Evaluation     Self-care/ Home management     Manual therapy     Neuromuscular Re-education     Therapeutic Activity     Therapeutic Exercises 28 See flow sheet or above   Gait training     Modality__________________                Total 28    Blank areas are intentional and mean the treatment did not include these items.       Hortensia Solano, PT  1/13/2020

## 2021-06-08 NOTE — TELEPHONE ENCOUNTER
Last refill 11/18/2019 30 pills with 0 refill    Last Office visit  11/29/2019 for vaginal pain     checked 6/17/2020    No CSA on file

## 2021-06-08 NOTE — TELEPHONE ENCOUNTER
Controlled Substance Refill Request  Medication Name:   Requested Prescriptions     Pending Prescriptions Disp Refills     diazePAM (VALIUM) 10 MG tablet 30 tablet 0     Sig: Take 0.5-1 tablets (5-10 mg total) by mouth at bedtime as needed.     Date Last Fill: 11/18/19  Requested [facility]acy: [facility]  Submit electronically to pharmacy  Controlled Substance Agreement on file:   Encounter-Level CSA Scan Date:    There are no encounter-level csa scan date.        Last office visit:  11/29/19

## 2021-06-09 NOTE — TELEPHONE ENCOUNTER
Pruritis entire body.  No visible rash.  This has been ongoing for 4-5 days.  Side of tongue itches as well.  Taking 4 Zyrtec a day.  Trying to eliminate any irritants.  Cannot sleep.  Feeling anxious secondary to the itching.    Reason for Disposition    [1] Widespread itching AND [2] cause unknown AND [3] present > 48 hours (Exception: caller knows the cause and can eliminate it)    Protocols used: ITCHING - WIDESPREAD-A-

## 2021-06-09 NOTE — PROGRESS NOTES
"Trenton Milian is a 59 y.o. female who is being evaluated via a billable video visit.      The patient has been notified of following:     \"This video visit will be conducted via a call between you and your physician/provider. We have found that certain health care needs can be provided without the need for an in-person physical exam.  This service lets us provide the care you need with a video conversation.  If a prescription is necessary we can send it directly to your pharmacy.  If lab work is needed we can place an order for that and you can then stop by our lab to have the test done at a later time.    Video visits are billed at different rates depending on your insurance coverage. Please reach out to your insurance provider with any questions.    If during the course of the call the physician/provider feels a video visit is not appropriate, you will not be charged for this service.\"    Patient has given verbal consent to a Video visit? Yes    Will anyone else be joining your video visit? No        Video Start Time: 3:11 PM    Additional provider notes:   Trenton Milian 59 y.o.. female with   Chief Complaint   Patient presents with     body itchy     x 1 week, itchy all over, no change on food, soap.        S:    1 wk of generalized itchiness  Not all at the same time, sporadic, and moving  She thinks she is sensitive to medication and may have developed sensitivity to her meds  No new meds, food, chemical, topicals except 1 month ago had tooth implant   denies similar symptoms  No one else she knows has similar symptoms  Takes zyrtec 4-5 pills per day  No rash, no fever, sore throat, N/V, diarrhea  Sensitive to benadryl    O:  Constitutional: Patient is oriented to person, place, and time. Patient appears well-developed and well-nourished. No distress.   Head: Normocephalic and atraumatic.   Right Ear: External ear normal.   Left Ear: External ear normal.   Nose: Nose normal.   Eyes: Conjunctivae and " EOM are normal. Right eye exhibits no discharge. Left eye exhibits no discharge. No scleral icterus.   Neurological: Patient is alert and oriented to person, place, and time.   The patient has good eye contact.  No psychomotor retardation or stereotypical behaviors.  Speech was regular rate, regular rhythm, adequate responses.  Mood was stable and affect was congruent mood.  No suicidal or homicidal intent.  No hallucination.      A/P:    Itching  Trial hydroxyzine.  May increase up to 100mg three times a day prn for the itchiness.  Reduce zyrtec to 10mg daily (as oppose to 50mg).  Discussed psychosomatic component of itchiness  Continue with skin hydration  F/u with PCP if still not better in 1-2 wks  -     hydrOXYzine HCL (ATARAX) 25 MG tablet; Take 1 tablet (25 mg total) by mouth 3 (three) times a day as needed for itching.  Dispense: 90 tablet; Refill: 0      Video-Visit Details    Type of service:  Video Visit    Video End Time (time video stopped): 3:30 PM  Originating Location (pt. Location): Home    Distant Location (provider location):  [facility]     Platform used for Video Visit: Santiago Parkinson MD

## 2021-06-09 NOTE — TELEPHONE ENCOUNTER
Last refill 6/17/2020 #30  Last OV 6/24/2020 for body itching    checked 7/22/2020 last filled 6/19/2020

## 2021-06-10 ENCOUNTER — COMMUNICATION - HEALTHEAST (OUTPATIENT)
Dept: FAMILY MEDICINE | Facility: CLINIC | Age: 61
End: 2021-06-10

## 2021-06-10 DIAGNOSIS — F32.A ANXIETY AND DEPRESSION: ICD-10-CM

## 2021-06-10 DIAGNOSIS — F41.9 ANXIETY AND DEPRESSION: ICD-10-CM

## 2021-06-11 ENCOUNTER — AMBULATORY - HEALTHEAST (OUTPATIENT)
Dept: LAB | Facility: CLINIC | Age: 61
End: 2021-06-11

## 2021-06-11 DIAGNOSIS — Z11.52 ENCOUNTER FOR SCREENING FOR COVID-19: ICD-10-CM

## 2021-06-11 NOTE — PROGRESS NOTES
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  Dispense: 30 capsule; Refill: 0  At this point I think that she might have a possibility of ingrowing hair, early perineal abscess, sebaceous cyst that is getting inflamed.  At this point it does not have any fluctuance, as such I do not think that is due to be incised.  I am hopeful that we can still be able to do with it with oral antibiotics and warm compresses.  Medication was sent to the pharmacy which included to start taking right away and she can continue to do warm salt sitz baths.  Advised to let us know if there is no improvement when she comes back from her travel.    Subjective:    Patient ID: Trenton Milian is a 60 y.o. female.    60 years old female who comes in today concerned with a sudden onset of a perineal swelling that she noticed to the right is small pimple.  She noted that the eruption grew, became painful.  She has been doing warm compresses, and trying to nina it but has been unable to.  She noted pain when she is sitting down sometimes but able to walk around.  She denied having any fevers or chills.  There is no difficulty with having bowel movements or urination.  And she has not had this in the past.  She also has some questions regarding her cholesterol level.  She has tried different cholesterol medications and they have given her one concern of the other.      The following portions of the patient's history were reviewed and updated as appropriate: allergies, current medications, past family history, past medical history, past social history, past surgical history and problem list.    Review of Systems   Constitutional: Negative.    HENT: Negative.    Cardiovascular: Negative for chest pain.   Genitourinary: Positive for genital sores. Negative for dysuria, flank pain,  hematuria, pelvic pain and vaginal bleeding.   Musculoskeletal: Negative for back pain and neck stiffness.   Skin: Negative for wound.     Vitals:    09/21/20 1416   BP: 112/66   Pulse: 93   SpO2: 99%   Weight: 141 lb 5 oz (64.1 kg)             Objective:    Physical Exam   Constitutional: She appears well-developed and well-nourished. No distress.   Neck: Normal range of motion.   Cardiovascular: Normal rate.   Pulmonary/Chest: Effort normal.   Genitourinary:    Genitourinary Comments: Examination of the perineum using a chaperone show that she has a left sided perineal swelling localized just underneath the introitus between that and the anal area.  This is measuring about 5 cm, it is a superficial, mobile and well-circumscribed.  It is still firm and not fluctuant.  It is tender but not exquisitely tender.

## 2021-06-12 RX ORDER — SERTRALINE HYDROCHLORIDE 25 MG/1
25 TABLET, FILM COATED ORAL DAILY
Qty: 90 TABLET | Refills: 3 | Status: SHIPPED | OUTPATIENT
Start: 2021-06-12 | End: 2023-06-09

## 2021-06-12 NOTE — PROGRESS NOTES
[facility] Note                                                        Date of Visit: 10/5/2020     Chief Complaint   Trenton Milian is a(n) 60 y.o. White or  female who presents to [facility] with the following complaint(s):  Rash (all over body, flat and patchy--rsah started today been itching for 3 days)       Assessment and Plan   1. Hives  - cetirizine (ZYRTEC) 10 MG tablet; Take 1 tablet (10 mg total) by mouth 2 (two) times a day for 15 days.  Dispense: 30 tablet; Refill: 0  - predniSONE (DELTASONE) 20 MG tablet; Take 40 mg by mouth daily for 5 days Take with food..  Dispense: 10 tablet; Refill: 0  - hydrOXYzine HCL (ATARAX) 25 MG tablet; Take 1-2 tablets (25-50 mg total) by mouth every 6 (six) hours as needed for itching.; Refill: 0      Patient with environmental allergies presenting with 3-day history of diffuse pruritus followed by diffuse hives today. No specific trigger identified by history. No signs / symptoms of angioedema or anaphylaxis. Treating with prednisone burst and temporary doubling of cetirizine dosing as listed above. Patient will continue using hydroxyzine as needed for pruritus.     Counseled patient regarding assessment and plan for evaluation and treatment. Questions were answered. See AVS for the specific written instructions and educational handout(s) regarding hives that were provided at the conclusion of the visit.     Discussed signs / symptoms that warrant urgent / emergent medical attention.     Follow up within 3 days if not improved.      History of Present Illness   Primary symptom: Rash  Onset: Today  Location: Full body  Appearance: Red patches  Progression: Comes / goes  Pruritic: Has noted generalized itching for the past 2-3 days.   Painful: No  Discharge: No  Bleeding: No  Fevers: No  Chills: No  Associated sore throat: No  Preceding flu-like symptoms: No  Lip / tongue / face swelling: No  Throat / neck swelling: No  Difficulty speaking: No  Difficulty  "swallowing: No  Difficulty breathing: No  Additional symptoms: None  Home therapies utilized: Took a single dose of hydroxyzine this afternoon with slight improvement in pruritus.   History of similar rash: No history of similar rash, but has episodes of pruritus in the past, most recently in June.   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   New detergent / fabric softener exposure: No  New personal hygiene product exposure: No  Pet / animal exposure: No     Review of Systems   Review of Systems   All other systems reviewed and are negative.       Physical Exam   Vitals:    10/05/20 1651   BP: 107/71   Patient Site: Right Arm   Patient Position: Sitting   Cuff Size: Adult Regular   Pulse: 85   Temp: 98.2  F (36.8  C)   TempSrc: Oral   SpO2: 96%   Weight: 104 lb 4.8 oz (47.3 kg)   Height: 5' 3\" (1.6 m)     Physical Exam  Vitals signs and nursing note reviewed.   Constitutional:       General: She is not in acute distress.     Appearance: She is well-developed and normal weight. She is not ill-appearing, toxic-appearing or diaphoretic.   HENT:      Head: Normocephalic and atraumatic.      Right Ear: Tympanic membrane, ear canal and external ear normal.      Left Ear: Tympanic membrane, ear canal and external ear normal.      Nose: No mucosal edema or rhinorrhea.      Mouth/Throat:      Mouth: Mucous membranes are moist. No oral lesions.      Pharynx: Uvula midline. No oropharyngeal exudate, posterior oropharyngeal erythema or uvula swelling.   Eyes:      General: Lids are normal.      Conjunctiva/sclera: Conjunctivae normal.   Neck:      Musculoskeletal: Neck supple. No edema or erythema.   Cardiovascular:      Rate and Rhythm: Normal rate and regular rhythm.      Heart sounds: S1 normal and S2 normal. No murmur. No friction rub. No gallop.    Pulmonary:      Effort: Pulmonary effort is normal.      Breath " sounds: Normal breath sounds. No stridor. No wheezing, rhonchi or rales.   Lymphadenopathy:      Cervical: No cervical adenopathy.   Skin:     General: Skin is warm and dry.      Coloration: Skin is not pale.      Findings: Rash (neck, upper extremities, torso, and lower extremities) present. Rash is urticarial.      Comments: Scattered areas of excoriation also noted.    Neurological:      General: No focal deficit present.      Mental Status: She is alert and oriented to person, place, and time.          Diagnostic Studies   Laboratory:  N/A  Radiology:  N/A  Electrocardiogram:  N/A     Procedure Note   N/A     Pertinent History   The following portions of the patient's history were reviewed and updated as appropriate: allergies, current medications, past family history, past medical history, past social history, past surgical history and problem list.    Patient has Left leg DVT (H); Lupus anticoagulant disorder (H); and Esophageal stricture on their problem list.    Patient has a past medical history of Asthma, Depression, Environmental allergies, Esophageal stricture (09/15/2014), Gastric ulcer (09/15/2014), GERD (gastroesophageal reflux disease), Left leg DVT (H), and Lupus (H).    Patient has a past surgical history that includes Mandible surgery (); Ovarian cyst surgery (Right, ); Appendectomy ();  section (); Total abdominal hysterectomy w/ bilateral salpingoophorectomy (); Sinus surgery (Bilateral, ); Excision / curettage bone cyst phalanges of foot (Left, ); Abdominoplasty (); and Esophagogastroduodenoscopy (09/15/2014).    Patient's family history includes Anxiety disorder in her brother; Arthritis in her father and mother; Breast cancer in her maternal grandmother and paternal aunt; Breast cancer (age of onset: 85) in her mother; Colon cancer in her brother; Gout in her son and son; Heart disease in her father; Hypertension in her father and mother; Obesity in her  brother; Other in her maternal grandmother; Thyroid disease in her mother.    Patient reports that she has been smoking cigarettes. She has a 20.00 pack-year smoking history. She has never used smokeless tobacco. She reports that she does not drink alcohol or use drugs.     Portions of this note have been dictated using voice recognition software. Any grammatical or contextual distortions are unintentional and inherent to the software.    Afshin Mike MD  [facility]

## 2021-06-12 NOTE — TELEPHONE ENCOUNTER
RN triage call from patient  She is reporting that she has a rash all over  She states she started itching 2 days ago then today the rash appeared  She does say that she had been on clindamycin recently  She reports flat red bumps all over and some purple broken blood vessel areas (these parts do not itch)  She states she is just frantic with itching  No trouble with breathing or swallowing   No lip or tongue swelling  Patient reports temp of 99.3  Patient states her face is red and itching      Gave disposition to be seen now and advised [facility] and patient was agreeable  She will go now and will pull over to call 911 if any worsening symptoms  Alyssia Ayala, RN  [facility] Triage Nurse  4:30 PM  10/5/2020              Reason for Disposition    Purple or blood-colored spots or dots (no fever and sounds well to triager)    Additional Information    Widespread rash also present    Negative: Sudden onset of rash (within last 2 hours) and difficulty with breathing or swallowing    Negative: Difficult to awaken or acting confused (e.g., disoriented, slurred speech)    Negative: Fever and purple or blood-colored spots or dots    Negative: Too weak or sick to stand    Negative: Life-threatening reaction (anaphylaxis) in the past to similar substance (e.g., food, insect bite/sting, chemical, etc.) and < 2 hours since exposure    Negative: Sounds like a life-threatening emergency to the triager    Protocols used: RASH OR REDNESS - WIDESPREAD-A-OH, ITCHING - WIDESPREAD-A-OH

## 2021-06-13 ENCOUNTER — COMMUNICATION - HEALTHEAST (OUTPATIENT)
Dept: SCHEDULING | Facility: CLINIC | Age: 61
End: 2021-06-13

## 2021-06-13 NOTE — TELEPHONE ENCOUNTER
----- Message from Gema Burgess MD sent at 12/3/2020  8:02 AM CST -----  Urine Culture positive for bacteria.  The cephalexin you are taking will cover it.  Finish the full course of antibiotic and follow up if not improving as anticipated.  Gema Burgess MD

## 2021-06-13 NOTE — PROGRESS NOTES
"  Assessment & Plan:       1. Acute cystitis with hematuria  Urinalysis-UC if Indicated    Culture, Urine    cephalexin (KEFLEX) 500 MG capsule      Medical Decision Making  Patient presents with acute onset dysuria.  Urine analysis does show signs consistent with a urinary tract infection.  Minimal concern for pyelonephritis given no fevers and no CVA tenderness.  Will treat patient with oral antibiotics.  Also recommended drinking plenty of clear fluids.  Discussed signs of worsening symptoms and when to follow-up with PCP if no symptom improvement.    Patient Instructions   Analysis of your urine showed signs of a urinary tract infection.     Take the prescribed antibiotics for the entire course, even if symptoms improve.  You should expect improvement to begin in 24 hours. In the meantime, continue to drink plenty of fluids.  Recommend daily use of a probiotic while taking prescribed medication (a common brand is Culturelle, yogurt with \"active cultures\" are also appropriate).    Reasons to come back for re-evaluation:  - Develop a fever, back or flank pain, or nausea and vomiting  - If symptoms have not completely improved after 72 hours  - Recurrent symptoms within a few weeks after treatment has concluded        Subjective:       Trenton Milian is a 60 y.o. female here for evaluation of dysuria.  Onset of symptoms was 3 days ago.  Associated symptoms include increased urinary frequency and suprapubic abdominal pains radiating to the lower back.  Patient otherwise denies fevers, nausea, and vomiting.  No recent urinary tract infections.    The following portions of the patient's history were reviewed and updated as appropriate: allergies, current medications and problem list.    Review of Systems  Pertinent items are noted in HPI.     Allergies  Allergies   Allergen Reactions     Augmentin [Amoxicillin-Pot Clavulanate]      itchy     Clindamycin      Fentanyl      itchy     Penicillins      itchy     Septra " [Sulfamethoxazole-Trimethoprim]      itchy     Tramadol      itchy     Zyban [Bupropion Hcl]      Personality Change       Family History   Problem Relation Age of Onset     Hypertension Mother      Thyroid disease Mother      Arthritis Mother      Breast cancer Mother 85        breast cancer     Hypertension Father      Heart disease Father         Valve replacement ,bypass     Arthritis Father      Gout Son      Breast cancer Maternal Grandmother      Other Maternal Grandmother         some sort blood clot.     Breast cancer Paternal Aunt      Anxiety disorder Brother      Obesity Brother      Colon cancer Brother         Rectal Cancer.     Gout Son        Social History     Socioeconomic History     Marital status:      Spouse name: None     Number of children: None     Years of education: None     Highest education level: None   Occupational History     Employer: RETIRED   Social Needs     Financial resource strain: None     Food insecurity     Worry: None     Inability: None     Transportation needs     Medical: None     Non-medical: None   Tobacco Use     Smoking status: Current Every Day Smoker     Packs/day: 0.50     Years: 40.00     Pack years: 20.00     Types: Cigarettes     Smokeless tobacco: Never Used   Substance and Sexual Activity     Alcohol use: Never     Frequency: Never     Drug use: Never     Sexual activity: Yes     Partners: Male   Lifestyle     Physical activity     Days per week: None     Minutes per session: None     Stress: None   Relationships     Social connections     Talks on phone: None     Gets together: None     Attends [employer] service: None     Active member of club or organization: None     Attends meetings of clubs or organizations: None     Relationship status: None     Intimate partner violence     Fear of current or ex partner: None     Emotionally abused: None     Physically abused: None     Forced sexual activity: None   Other Topics Concern     None   Social History  "Narrative     None         Objective:       /76 (Patient Site: Right Arm, Patient Position: Sitting, Cuff Size: Adult Regular)   Pulse 92   Temp 98.3  F (36.8  C) (Oral)   Ht 5' 3.5\" (1.613 m)   Wt 141 lb 14.4 oz (64.4 kg)   LMP 06/27/1989   SpO2 97%   BMI 24.74 kg/m    General appearance: alert, appears stated age, cooperative, no distress and non-toxic  Back: No CVA tenderness  Lungs: clear to auscultation bilaterally  Heart: regular rate and rhythm, S1, S2 normal, no murmur, click, rub or gallop  Abdomen: Tenderness across the suprapubic region, otherwise abdomen soft, normal bowel sounds, no masses organomegaly  Skin: Skin color, texture, turgor normal. No rashes or lesions     Lab Results    Recent Results (from the past 24 hour(s))   Urinalysis-UC if Indicated   Result Value Ref Range    Color, UA Yellow Colorless, Yellow, Straw, Light Yellow    Clarity, UA Clear Clear    Glucose, UA Negative Negative    Bilirubin, UA Negative Negative    Ketones, UA Negative Negative    Specific Gravity, UA 1.020 1.005 - 1.030    Blood, UA Moderate (!) Negative    pH, UA 5.5 5.0 - 8.0    Protein, UA Negative Negative mg/dL    Urobilinogen, UA 0.2 E.U./dL 0.2 E.U./dL, 1.0 E.U./dL    Nitrite, UA Negative Negative    Leukocytes, UA Small (!) Negative    Bacteria, UA Moderate (!) None Seen hpf    RBC, UA  (!) None Seen, 0-2 hpf    WBC, UA >100 (!) None Seen, 0-5 hpf    Squam Epithel, UA 5-10 (!) None Seen, 0-5 lpf    Trans Epithel, UA 5-10 (!) None Seen lpf     I personally reviewed these results and discussed findings with the patient.      "

## 2021-06-13 NOTE — PROGRESS NOTES
Impression:  Urinary tract infection.  She recently had a Proteus UTI that was sensitive to Keflex.  She was treated with Keflex for 10 days and her symptoms improved but now they have returned.  She could just have a another urinary tract infection.  But also concern for the possibility that she never completely cleared the first 1 for unclear reasons.  If she continues to have these she may need a urologic work-up.  The previous culture was also sensitive to Levaquin so we will go with that this time    Plan:  Levaquin for 10 days.  Fluids.  If she develops recurrent UTIs after that she may need urologic work-up      Chief Complaint:  Chief Complaint   Patient presents with     Urinary Tract Infection     pain in urination     Back Pain     bowel mov't problem         HPI:   Trenton Milian is a 60 y.o. female who presents to this clinic for the evaluation of dysuria.  Patient complains of 2 to 3-day history of dysuria, frequency, urgency, low back pain.  No fever chills nausea or vomiting.  She does have suprapubic pain and tenderness.  No other abdominal pain.  No other complaints.  She did have a urinary tract infection earlier in the month and was treated with Keflex.  Culture grew Proteus which was sensitive to Keflex.  However the symptoms have returned after she has been off antibiotics for several days      PMH:   Past Medical History:   Diagnosis Date     Asthma      Depression      Environmental allergies      Esophageal stricture 09/15/2014    EGD done at [facility].     Gastric ulcer 09/15/2014    EGD done at [facility].     GERD (gastroesophageal reflux disease)      Left leg DVT (H)      Lupus (H)      Past Surgical History:   Procedure Laterality Date     ABDOMINOPLASTY       APPENDECTOMY        SECTION       ESOPHAGOGASTRODUODENOSCOPY  09/15/2014    Esophageal stricture and gastric ulceration.     EXCISION / CURETTAGE BONE CYST PHALANGES OF FOOT Left       MANDIBLE SURGERY  2007    lower jaw advancement. Left over numbness of tongue and gums.Totally 5 surgeries.     OVARIAN CYST SURGERY Right 1977     SINUS SURGERY Bilateral 1996     TOTAL ABDOMINAL HYSTERECTOMY W/ BILATERAL SALPINGOOPHORECTOMY  1987    for ovarian infection.         ROS:  All other systems negative    Meds:    Current Outpatient Medications:      calcium carbonate-vitamin D3 (CALTRATE 600 PLUS D3) 600 mg(1,500mg) -400 unit per tablet, , Disp: , Rfl:      cetirizine (ZYRTEC) 10 MG tablet, Take 1 tablet (10 mg total) by mouth daily., Disp: 90 tablet, Rfl: 1     gabapentin (NEURONTIN) 300 MG capsule, 300 mg.    , Disp: , Rfl:      omeprazole (PRILOSEC) 20 MG capsule, Take 1 capsule (20 mg total) by mouth daily before breakfast., Disp: 90 capsule, Rfl: 1     sertraline (ZOLOFT) 25 MG tablet, Take 1 tablet (25 mg total) by mouth daily., Disp: 90 tablet, Rfl: 1     XARELTO 20 mg tablet, 20 mg once.    , Disp: , Rfl:      CERAVE Crea cream, , Disp: , Rfl:      diazePAM (VALIUM) 10 MG tablet, TAKE 1/2 TO 1 TABLET(5 TO 10 MG) BY MOUTH AT BEDTIME AS NEEDED, Disp: 30 tablet, Rfl: 0     hydrOXYzine HCL (ATARAX) 25 MG tablet, Take 1-2 tablets (25-50 mg total) by mouth every 6 (six) hours as needed for itching., Disp: , Rfl: 0     levoFLOXacin (LEVAQUIN) 500 MG tablet, Take 1 tablet (500 mg total) by mouth daily for 10 days., Disp: 10 tablet, Rfl: 0     MUCINEX 600 mg 12 hr tablet, , Disp: , Rfl:      PROAIR HFA 90 mcg/actuation inhaler, , Disp: , Rfl:      VOLTAREN 1 % Gel, Apply 2 g topically 2 (two) times a day as needed (to Feet)., Disp: 150 g, Rfl: 2        Social:  Social History     Socioeconomic History     Marital status:      Spouse name: Not on file     Number of children: Not on file     Years of education: Not on file     Highest education level: Not on file   Occupational History     Employer: RETIRED   Social Needs     Financial resource strain: Not on file     Food insecurity     Worry:  Not on file     Inability: Not on file     Transportation needs     Medical: Not on file     Non-medical: Not on file   Tobacco Use     Smoking status: Current Every Day Smoker     Packs/day: 0.50     Years: 40.00     Pack years: 20.00     Types: Cigarettes     Smokeless tobacco: Never Used   Substance and Sexual Activity     Alcohol use: Never     Frequency: Never     Drug use: Never     Sexual activity: Yes     Partners: Male   Lifestyle     Physical activity     Days per week: Not on file     Minutes per session: Not on file     Stress: Not on file   Relationships     Social connections     Talks on phone: Not on file     Gets together: Not on file     Attends [employer] service: Not on file     Active member of club or organization: Not on file     Attends meetings of clubs or organizations: Not on file     Relationship status: Not on file     Intimate partner violence     Fear of current or ex partner: Not on file     Emotionally abused: Not on file     Physically abused: Not on file     Forced sexual activity: Not on file   Other Topics Concern     Not on file   Social History Narrative     Not on file         Physical Exam:  Vitals:    12/18/20 0723   BP: 102/69   Pulse: (!) 103   Resp: 14   Temp: 98.2  F (36.8  C)   SpO2: 98%   Weight: 144 lb (65.3 kg)        Neuro: Normal motor and sensory function in all extremities  Psych: Awake, alert, normally responsive      Results:    Recent Results (from the past 24 hour(s))   Urinalysis-UC if Indicated   Result Value Ref Range    Color, UA Yellow Colorless, Yellow, Straw, Light Yellow    Clarity, UA Cloudy (!) Clear    Glucose, UA Negative Negative    Bilirubin, UA Negative Negative    Ketones, UA Negative Negative    Specific Gravity, UA 1.025 1.005 - 1.030    Blood, UA Moderate (!) Negative    pH, UA 6.0 5.0 - 8.0    Protein, UA 30 mg/dL (!) Negative mg/dL    Urobilinogen, UA 0.2 E.U./dL 0.2 E.U./dL, 1.0 E.U./dL    Nitrite, UA Negative Negative    Leukocytes, UA  Large (!) Negative    Bacteria, UA Moderate (!) None Seen hpf    RBC, UA 10-25 (!) None Seen, 0-2 hpf    WBC, UA >100 (!) None Seen, 0-5 hpf    Squam Epithel, UA None Seen None Seen, 0-5 lpf       No results found.      Juanito Beckman MD

## 2021-06-13 NOTE — TELEPHONE ENCOUNTER
Left message for patient to contact the clinic. Ok to relay results.  Results have been sent to [facility] as well.

## 2021-06-13 NOTE — PROGRESS NOTES
Urine culture grew GNR, likely E. Coli. Patient is on Levaquin. Please update patient once speciation and sensitivities results, adjust antibiotics as needed.

## 2021-06-13 NOTE — TELEPHONE ENCOUNTER
Patient Returning Call  Reason for call:  Returning clinic call.  Information relayed to patient:  The patient was read the note entry of Dr Burgess, patient expressed understanding of the information given.  Patient has additional questions:  No  If YES, what are your questions/concerns:  NA  Okay to leave a detailed message?: No call back needed

## 2021-06-15 NOTE — PROGRESS NOTES
ASSESSMENT:  1. Right-sided low back pain with right-sided sciatica, unspecified chronicity to do was able to do that  - XR Lumbar Spine 2 or 3 VWS; Future  - predniSONE (DELTASONE) 20 MG tablet; Take 40 mg by mouth daily.  Dispense: 10 tablet; Refill: 0    2. Chronic gastritis without bleeding, unspecified gastritis type  - omeprazole (PRILOSEC) 20 MG capsule; Take 1 capsule (20 mg total) by mouth daily before breakfast.  Dispense: 90 capsule; Refill: 1    3. Environmental allergies  - cetirizine (ZYRTEC) 10 MG tablet; Take 1 tablet (10 mg total) by mouth daily.  Dispense: 90 tablet; Refill: 1    4. Cervical pain (neck)  - diazePAM (VALIUM) 10 MG tablet; TAKE 1/2 TO 1 TABLET(5 TO 10 MG) BY MOUTH AT BEDTIME AS NEEDED  Dispense: 30 tablet; Refill: 2  - predniSONE (DELTASONE) 20 MG tablet; Take 40 mg by mouth daily.  Dispense: 10 tablet; Refill: 0    5. Lupus anticoagulant disorder (H)  - XARELTO 20 mg tablet; Take 1 tablet (20 mg total) by mouth daily.  Dispense: 90 tablet; Refill: 2    6. Travel advice encounter  - Asymptomatic COVID-19 Virus (CORONAVIRUS) PCR; Future      PLAN / MDM:  We discussed the different concerns that she has.  We will go ahead and have her get an x-ray of the lower back as initial evaluation for the sciatic pain.  I also prescribed prednisone for her and she will take some Tylenol ibuprofen as needed.  And when she is back, will consider referral based on the x-ray report.  I did put in a referral for her to get coronavirus testing.  Medications were refilled.    Orders Placed This Encounter   Procedures     XR Lumbar Spine 2 or 3 VWS     Standing Status:   Future     Standing Expiration Date:   3/2/2022     Order Specific Question:   Reason for Exam (Describe Symptoms):     Answer:   Sciatic pain on the right     Order Specific Question:   Can the procedure be changed per Radiologist protocol?     Answer:   Yes     Asymptomatic COVID-19 Virus (CORONAVIRUS) PCR     Standing Status:   Future      Standing Expiration Date:   3/2/2022     Order Specific Question:   Reason?     Answer:   No Procedure     Order Specific Question:   Asymptomatic or Symptomatic:     Answer:   Asymptomatic     Order Specific Question:   Symptomatic for COVID-19 as defined by CDC?     Answer:   No     Order Specific Question:   Employed in healthcare setting?     Answer:   No     Order Specific Question:   Close contact of Employee or Provider/Resident/Faculty at [facility]?     Answer:   No     Order Specific Question:   Resident lives or works in a congregate care/living setting?     Answer:   No     Order Specific Question:   Pregnant?     Answer:   No     Order Specific Question:   First COVID-19 test?     Answer:   No     Medications Discontinued During This Encounter   Medication Reason     hydrOXYzine HCL (ATARAX) 25 MG tablet Therapy completed     XARELTO 20 mg tablet Reorder     diazePAM (VALIUM) 10 MG tablet Reorder     cetirizine (ZYRTEC) 10 MG tablet Reorder     omeprazole (PRILOSEC) 20 MG capsule Reorder       No follow-ups on file.      CHIEF COMPLAINT:  Chief Complaint   Patient presents with     Follow-up     medication       HISTORY OF PRESENT ILLNESS:  Trenton is a 60 y.o. female presenting to the clinic today, she is here for medication management and needs to have medication refilled. She is also travelling to [facility] and needs to have a Covid 19 testing.  She does have concerns that includes and neck pain that appears to be radiating to the right upper extremity.  She is a previous history of the neck pain.  This was managed initially but now it is beginning to bother her again.  She also has lower back pain which is mild but has a moderate episode of sciatic pain.  This makes it difficult for her to sleep at night because it gives waking her she does have some pain radiating down to the lower leg.  She needs to have some medications are refilled.  She is going to some meetings with her  for  a vacation for 8 days.    REVIEW OF SYSTEMS:   As in the history.  She does not have any fevers and no chills.  She does feel well otherwise.  All other systems are negative.    PFSH:  Reviewed, as below.    Social History     Tobacco Use   Smoking Status Current Every Day Smoker     Packs/day: 0.50     Years: 40.00     Pack years: 20.00     Types: Cigarettes   Smokeless Tobacco Never Used       Family History   Problem Relation Age of Onset     Hypertension Mother      Thyroid disease Mother      Arthritis Mother      Breast cancer Mother 85        breast cancer     Hypertension Father      Heart disease Father         Valve replacement ,bypass     Arthritis Father      Gout Son      Breast cancer Maternal Grandmother      Other Maternal Grandmother         some sort blood clot.     Breast cancer Paternal Aunt      Anxiety disorder Brother      Obesity Brother      Colon cancer Brother         Rectal Cancer.     Gout Son        Social History     Socioeconomic History     Marital status:      Spouse name: Not on file     Number of children: Not on file     Years of education: Not on file     Highest education level: Not on file   Occupational History     Employer: RETIRED   Social Needs     Financial resource strain: Not on file     Food insecurity     Worry: Not on file     Inability: Not on file     Transportation needs     Medical: Not on file     Non-medical: Not on file   Tobacco Use     Smoking status: Current Every Day Smoker     Packs/day: 0.50     Years: 40.00     Pack years: 20.00     Types: Cigarettes     Smokeless tobacco: Never Used   Substance and Sexual Activity     Alcohol use: Never     Frequency: Never     Drug use: Never     Sexual activity: Yes     Partners: Male   Lifestyle     Physical activity     Days per week: Not on file     Minutes per session: Not on file     Stress: Not on file   Relationships     Social connections     Talks on phone: Not on file     Gets together: Not on file      Attends [employer] service: Not on file     Active member of club or organization: Not on file     Attends meetings of clubs or organizations: Not on file     Relationship status: Not on file     Intimate partner violence     Fear of current or ex partner: Not on file     Emotionally abused: Not on file     Physically abused: Not on file     Forced sexual activity: Not on file   Other Topics Concern     Not on file   Social History Narrative     Not on file       Past Surgical History:   Procedure Laterality Date     ABDOMINOPLASTY       APPENDECTOMY  1978      SECTION  1985     ESOPHAGOGASTRODUODENOSCOPY  09/15/2014    Esophageal stricture and gastric ulceration.     EXCISION / CURETTAGE BONE CYST PHALANGES OF FOOT Left 2008     MANDIBLE SURGERY  2007    lower jaw advancement. Left over numbness of tongue and gums.Totally 5 surgeries.     OVARIAN CYST SURGERY Right 1977     SINUS SURGERY Bilateral      TOTAL ABDOMINAL HYSTERECTOMY W/ BILATERAL SALPINGOOPHORECTOMY      for ovarian infection.       Allergies   Allergen Reactions     Augmentin [Amoxicillin-Pot Clavulanate]      itchy     Clindamycin      Fentanyl      itchy     Penicillins      itchy     Septra [Sulfamethoxazole-Trimethoprim]      itchy     Tramadol      itchy     Zyban [Bupropion Hcl]      Personality Change       Active Ambulatory Problems     Diagnosis Date Noted     Left leg DVT (H)      Lupus anticoagulant disorder (H) 09/10/2019     Esophageal stricture 09/10/2019     Resolved Ambulatory Problems     Diagnosis Date Noted     No Resolved Ambulatory Problems     Past Medical History:   Diagnosis Date     Asthma      Depression      Environmental allergies      Gastric ulcer 09/15/2014     GERD (gastroesophageal reflux disease)      Lupus (H)        Current Outpatient Medications   Medication Sig Dispense Refill     calcium carbonate-vitamin D3 (CALTRATE 600 PLUS D3) 600 mg(1,500mg) -400 unit per tablet        CERAVE Crea  "cream        cetirizine (ZYRTEC) 10 MG tablet Take 1 tablet (10 mg total) by mouth daily. 90 tablet 1     omeprazole (PRILOSEC) 20 MG capsule Take 1 capsule (20 mg total) by mouth daily before breakfast. 90 capsule 1     sertraline (ZOLOFT) 25 MG tablet Take 1 tablet (25 mg total) by mouth daily. 90 tablet 1     XARELTO 20 mg tablet Take 1 tablet (20 mg total) by mouth daily. 90 tablet 2     diazePAM (VALIUM) 10 MG tablet TAKE 1/2 TO 1 TABLET(5 TO 10 MG) BY MOUTH AT BEDTIME AS NEEDED 30 tablet 2     gabapentin (NEURONTIN) 300 MG capsule 300 mg.              MUCINEX 600 mg 12 hr tablet        predniSONE (DELTASONE) 20 MG tablet Take 40 mg by mouth daily. 10 tablet 0     PROAIR HFA 90 mcg/actuation inhaler        VOLTAREN 1 % Gel Apply 2 g topically 2 (two) times a day as needed (to Feet). 150 g 2     No current facility-administered medications for this visit.        VITALS:  Vitals:    03/02/21 0930   BP: 110/70   Patient Site: Right Arm   Patient Position: Sitting   Cuff Size: Adult Regular   Pulse: 90   Temp: 98.4  F (36.9  C)   TempSrc: Oral   SpO2: 97%   Weight: 143 lb 11.2 oz (65.2 kg)   Height: 5' 3.25\" (1.607 m)     Wt Readings from Last 3 Encounters:   03/02/21 143 lb 11.2 oz (65.2 kg)   12/18/20 144 lb (65.3 kg)   12/01/20 141 lb 14.4 oz (64.4 kg)     Body mass index is 25.25 kg/m .    PHYSICAL EXAM:  General Appearance: Alert, cooperative, no distress, appears stated age  HEENT: Pupils are equal and reactive, extraocular motions is normal.  Oropharynx is moist.  Neck is supple no notable thyromegaly.  External ears are normal.  Lungs: Clear to auscultation bilaterally, respirations unlabored  Heart: Regular rhythm and normal rate,S1 and S2 normal, no murmur, rub, or gallop  Abdomen: Soft  Musculoskeletal: Normal range of motion, with neck muscle spasm, into the trapezius. Some lower back spasm.  Neurologic:  Alert and oriented times 3. Normal reflexes. Cranial nerves II-XII intact.   Psychiatric: Normal " mood and affect.    MEDICATIONS:  Current Outpatient Medications   Medication Sig Dispense Refill     calcium carbonate-vitamin D3 (CALTRATE 600 PLUS D3) 600 mg(1,500mg) -400 unit per tablet        CERAVE Crea cream        cetirizine (ZYRTEC) 10 MG tablet Take 1 tablet (10 mg total) by mouth daily. 90 tablet 1     omeprazole (PRILOSEC) 20 MG capsule Take 1 capsule (20 mg total) by mouth daily before breakfast. 90 capsule 1     sertraline (ZOLOFT) 25 MG tablet Take 1 tablet (25 mg total) by mouth daily. 90 tablet 1     XARELTO 20 mg tablet Take 1 tablet (20 mg total) by mouth daily. 90 tablet 2     diazePAM (VALIUM) 10 MG tablet TAKE 1/2 TO 1 TABLET(5 TO 10 MG) BY MOUTH AT BEDTIME AS NEEDED 30 tablet 2     gabapentin (NEURONTIN) 300 MG capsule 300 mg.              MUCINEX 600 mg 12 hr tablet        predniSONE (DELTASONE) 20 MG tablet Take 40 mg by mouth daily. 10 tablet 0     PROAIR HFA 90 mcg/actuation inhaler        VOLTAREN 1 % Gel Apply 2 g topically 2 (two) times a day as needed (to Feet). 150 g 2     No current facility-administered medications for this visit.

## 2021-06-16 PROBLEM — K22.2 ESOPHAGEAL STRICTURE: Status: ACTIVE | Noted: 2019-09-10

## 2021-06-16 PROBLEM — D68.62 LUPUS ANTICOAGULANT DISORDER (H): Status: ACTIVE | Noted: 2019-09-10

## 2021-06-16 NOTE — PROGRESS NOTES
ASSESMENT AND PLAN  1. Burning with urination  Urinalysis-UC if Indicated    Culture, Urine   2. Acute cystitis with hematuria  cefdinir (OMNICEF) 300 MG capsule      UA consistent with UTI.  Will treat with cefdinir.  Discussed medications proper administration and side effects.  Given this seems to be recurring more frequently advised her to follow-up with her PCP or urologist.    The plan of care was discussed with the patient. They understand and agree with the course of treatment prescribed. A printed summary was given including instructions and medications.  25 minutes spent on the date of the encounter doing chart review, history and exam, documentation and further activities per the note    Jovanni Lewis PA-C  The use of Dragon/Twistleation services may have been used to construct the content in this note; any grammatical or spelling errors are non-intentional. Please contact the author of this note directly if you are in need of any clarification.     SUBJECTIVE  Chief Complaint   Patient presents with     Urinary Frequency     With burning x over wk      Bloated     HPI:  Trenton Milian is a 60 y.o. female who presents today with 1 week of urinary burning, frequency, incomplete emptying and some bloating.  Denies any new onset back pain, nausea, vomiting, abdominal pain, fever or chills.  No vaginal discharge or lesions.    ROS:  Pertinent ROS neg other than the symptoms noted above in the HPI.     OBJECTIVE  Vitals:    04/05/21 1050   BP: 100/62   Patient Site: Right Arm   Patient Position: Sitting   Cuff Size: Adult Regular   Pulse: 93   Resp: 19   Temp: 98.5  F (36.9  C)   TempSrc: Oral   SpO2: 94%   Weight: 143 lb (64.9 kg)     Physical Exam:  General: No distress, alert and cooperative  Musculoskeletal: No CVA tenderness    Labs:  Recent Results (from the past 72 hour(s))   Urinalysis-UC if Indicated   Result Value Ref Range    Color, UA Yellow Colorless, Yellow, Straw, Light Yellow     Clarity, UA Clear Clear    Glucose, UA Negative Negative    Protein, UA Negative Negative    Bilirubin, UA Negative Negative    Urobilinogen, UA 0.2 E.U./dL 0.2 E.U./dL, 1.0 E.U./dL    pH, UA 7.0 5.0 - 8.0    Blood, UA Moderate (!) Negative    Ketones, UA Negative Negative    Nitrite, UA Negative Negative    Leukocytes, UA Moderate (!) Negative    Specific Gravity, UA 1.025 1.005 - 1.030    RBC, UA 25-50 (!) None Seen, 0-2 hpf    WBC, UA >100 (!) None Seen, 0-5 hpf    Bacteria, UA Moderate (!) None Seen    Squam Epithel, UA 0-5 None Seen, 0-5 lpf       Radiology:      Problem List:  2019-09: Lupus anticoagulant disorder (H)  2019-09: Esophageal stricture  Left leg DVT (H)      Past Medical History:   Diagnosis Date     Asthma      Depression      Environmental allergies      Esophageal stricture 09/15/2014    EGD done at [facility].     Gastric ulcer 09/15/2014    EGD done at [facility].     GERD (gastroesophageal reflux disease)      Left leg DVT (H)      Lupus (H)        Current Outpatient Medications on File Prior to Visit   Medication Sig Dispense Refill     calcium carbonate-vitamin D3 (CALTRATE 600 PLUS D3) 600 mg(1,500mg) -400 unit per tablet        CERAVE Crea cream        cetirizine (ZYRTEC) 10 MG tablet Take 1 tablet (10 mg total) by mouth daily. 90 tablet 1     diazePAM (VALIUM) 10 MG tablet TAKE 1/2 TO 1 TABLET(5 TO 10 MG) BY MOUTH AT BEDTIME AS NEEDED 30 tablet 2     gabapentin (NEURONTIN) 300 MG capsule 300 mg.              LINZESS 72 mcg cap capsule        MUCINEX 600 mg 12 hr tablet        omeprazole (PRILOSEC) 20 MG capsule Take 1 capsule (20 mg total) by mouth daily before breakfast. 90 capsule 1     PROAIR HFA 90 mcg/actuation inhaler        sertraline (ZOLOFT) 25 MG tablet Take 1 tablet (25 mg total) by mouth daily. 90 tablet 1     VOLTAREN 1 % Gel Apply 2 g topically 2 (two) times a day as needed (to Feet). 150 g 2     XARELTO 20 mg tablet Take 1 tablet (20 mg total) by  mouth daily. 90 tablet 2     No current facility-administered medications on file prior to visit.         Social History     Tobacco Use     Smoking status: Current Every Day Smoker     Packs/day: 0.50     Years: 40.00     Pack years: 20.00     Types: Cigarettes     Smokeless tobacco: Never Used   Substance Use Topics     Alcohol use: Never     Frequency: Never

## 2021-06-17 NOTE — PATIENT INSTRUCTIONS - HE
"Patient Instructions by Nestor South MD at 8/11/2019  8:40 AM     Author: Nestor South MD Service: -- Author Type: Physician    Filed: 8/11/2019  9:09 AM Encounter Date: 8/11/2019 Status: Signed    : Nestor South MD (Physician)         Patient Education     Bladder Infection, Female (Adult)    Urine is normally doesn't have any bacteria in it. But bacteria can get into the urinary tract from the skin around the rectum. Or they can travel in the blood from elsewhere in the body. Once they are in your urinary tract, they can cause infection in the urethra (urethritis), the bladder (cystitis), or the kidneys (pyelonephritis).  The most common place for an infection is in the bladder. This is called a bladder infection. This is one of the most common infections in women. Most bladder infections are easily treated. They are not serious unless the infection spreads to the kidney.  The phrases \"bladder infection,\" \"UTI,\" and \"cystitis\" are often used to describe the same thing. But they are not always the same. Cystitis is an inflammation of the bladder. The most common cause of cystitis is an infection.  Symptoms  The infection causes inflammation in the urethra and bladder. This causes many of the symptoms. The most common symptoms of a bladder infection are:    Pain or burning when urinating    Having to urinate more often than usual    Urgent need to urinate    Only a small amount of urine comes out    Blood in urine    Abdominal discomfort. This is usually in the lower abdomen above the pubic bone.    Cloudy urine    Strong- or bad-smelling urine    Unable to urinate (urinary retention)    Unable to hold urine in (urinary incontinence)    Fever    Loss of appetite    Confusion (in older adults)  Causes  Bladder infections are not contagious. You can't get one from someone else, from a toilet seat, or from sharing a bath.  The most common cause of bladder infections is bacteria from the bowels. " The bacteria get onto the skin around the opening of the urethra. From there, they can get into the urine and travel up to the bladder, causing inflammation and infection. This usually happens because of:    Wiping improperly after urinating. Always wipe from front to back.    Bowel incontinence    Pregnancy    Procedures such as having a catheter inserted    Older age    Not emptying your bladder. This can allow bacteria a chance to grow in your urine.    Dehydration    Constipation    Sex    Use of a diaphragm for birth control   Treatment  Bladder infections are diagnosed by a urine test. They are treated with antibiotics and usually clear up quickly without complications. Treatment helps prevent a more serious kidney infection.  Medicines  Medicines can help in the treatment of a bladder infection:    Take antibiotics until they are used up, even if you feel better. It is important to finish them to make sure the infection has cleared.    You can use acetaminophen or ibuprofen for pain, fever, or discomfort, unless another medicine was prescribed. If you have chronic liver or kidney disease, talk with your healthcare provider before using these medicines. Also talk with your provider if you've ever had a stomach ulcer or gastrointestinal bleeding, or are taking blood-thinner medicines.    If you are given phenazopydridine to reduce burning with urination, it will cause your urine to become a bright orange color. This can stain clothing.  Care and prevention  These self-care steps can help prevent future infections:    Drink plenty of fluids to prevent dehydration and flush out your bladder. Do this unless you must restrict fluids for other health reasons, or your doctor told you not to.    Proper cleaning after going to the bathroom is important. Wipe from front to back after using the toilet to prevent the spread of bacteria.    Urinate more often. Don't try to hold urine in for a long time.    Wear  loose-fitting clothes and cotton underwear. Avoid tight-fitting pants.    Improve your diet and prevent constipation. Eat more fresh fruit and vegetables, and fiber, and less junk and fatty foods.    Avoid sex until your symptoms are gone.    Avoid caffeine, alcohol, and spicy foods. These can irritate your bladder.    Urinate right after intercourse to flush out your bladder.    If you use birth control pills and have frequent bladder infections, discuss it with your doctor.  Follow-up care  Call your healthcare provider if all symptoms are not gone after 3 days of treatment. This is especially important if you have repeat infections.  If a culture was done, you will be told if your treatment needs to be changed. If directed, you can call to find out the results.  If X-rays were done, you will be told if the results will affect your treatment.  Call 911  Call 911 if any of the following occur:    Trouble breathing    Hard to wake up or confusion    Fainting or loss of consciousness    Rapid heart rate  When to seek medical advice  Call your healthcare provider right away if any of these occur:    Fever of 100.4 F (38.0 C) or higher, or as directed by your healthcare provider    Symptoms are not better by the third day of treatment    Back or belly (abdominal) pain that gets worse    Repeated vomiting, or unable to keep medicine down    Weakness or dizziness    Vaginal discharge    Pain, redness, or swelling in the outer vaginal area (labia)  Date Last Reviewed: 10/1/2016    8997-7324 [employer]. [address] [place] [address], [place] [id number]. All rights reserved. This information is not intended as a substitute for professional medical care. Always follow your healthcare professional's instructions.           Patient Education     Bladder Infection, Female (Adult)    Urine is normally doesn't have any bacteria in it. But bacteria can get into the urinary tract from the skin around the rectum. Or they can  "travel in the blood from elsewhere in the body. Once they are in your urinary tract, they can cause infection in the urethra (urethritis), the bladder (cystitis), or the kidneys (pyelonephritis).  The most common place for an infection is in the bladder. This is called a bladder infection. This is one of the most common infections in women. Most bladder infections are easily treated. They are not serious unless the infection spreads to the kidney.  The phrases \"bladder infection,\" \"UTI,\" and \"cystitis\" are often used to describe the same thing. But they are not always the same. Cystitis is an inflammation of the bladder. The most common cause of cystitis is an infection.  Symptoms  The infection causes inflammation in the urethra and bladder. This causes many of the symptoms. The most common symptoms of a bladder infection are:    Pain or burning when urinating    Having to urinate more often than usual    Urgent need to urinate    Only a small amount of urine comes out    Blood in urine    Abdominal discomfort. This is usually in the lower abdomen above the pubic bone.    Cloudy urine    Strong- or bad-smelling urine    Unable to urinate (urinary retention)    Unable to hold urine in (urinary incontinence)    Fever    Loss of appetite    Confusion (in older adults)  Causes  Bladder infections are not contagious. You can't get one from someone else, from a toilet seat, or from sharing a bath.  The most common cause of bladder infections is bacteria from the bowels. The bacteria get onto the skin around the opening of the urethra. From there, they can get into the urine and travel up to the bladder, causing inflammation and infection. This usually happens because of:    Wiping improperly after urinating. Always wipe from front to back.    Bowel incontinence    Pregnancy    Procedures such as having a catheter inserted    Older age    Not emptying your bladder. This can allow bacteria a chance to grow in your " urine.    Dehydration    Constipation    Sex    Use of a diaphragm for birth control   Treatment  Bladder infections are diagnosed by a urine test. They are treated with antibiotics and usually clear up quickly without complications. Treatment helps prevent a more serious kidney infection.  Medicines  Medicines can help in the treatment of a bladder infection:    Take antibiotics until they are used up, even if you feel better. It is important to finish them to make sure the infection has cleared.    You can use acetaminophen or ibuprofen for pain, fever, or discomfort, unless another medicine was prescribed. If you have chronic liver or kidney disease, talk with your healthcare provider before using these medicines. Also talk with your provider if you've ever had a stomach ulcer or gastrointestinal bleeding, or are taking blood-thinner medicines.    If you are given phenazopydridine to reduce burning with urination, it will cause your urine to become a bright orange color. This can stain clothing.  Care and prevention  These self-care steps can help prevent future infections:    Drink plenty of fluids to prevent dehydration and flush out your bladder. Do this unless you must restrict fluids for other health reasons, or your doctor told you not to.    Proper cleaning after going to the bathroom is important. Wipe from front to back after using the toilet to prevent the spread of bacteria.    Urinate more often. Don't try to hold urine in for a long time.    Wear loose-fitting clothes and cotton underwear. Avoid tight-fitting pants.    Improve your diet and prevent constipation. Eat more fresh fruit and vegetables, and fiber, and less junk and fatty foods.    Avoid sex until your symptoms are gone.    Avoid caffeine, alcohol, and spicy foods. These can irritate your bladder.    Urinate right after intercourse to flush out your bladder.    If you use birth control pills and have frequent bladder infections, discuss it  with your doctor.  Follow-up care  Call your healthcare provider if all symptoms are not gone after 3 days of treatment. This is especially important if you have repeat infections.  If a culture was done, you will be told if your treatment needs to be changed. If directed, you can call to find out the results.  If X-rays were done, you will be told if the results will affect your treatment.  Call 911  Call 911 if any of the following occur:    Trouble breathing    Hard to wake up or confusion    Fainting or loss of consciousness    Rapid heart rate  When to seek medical advice  Call your healthcare provider right away if any of these occur:    Fever of 100.4 F (38.0 C) or higher, or as directed by your healthcare provider    Symptoms are not better by the third day of treatment    Back or belly (abdominal) pain that gets worse    Repeated vomiting, or unable to keep medicine down    Weakness or dizziness    Vaginal discharge    Pain, redness, or swelling in the outer vaginal area (labia)  Date Last Reviewed: 10/1/2016    6686-8439 [employer]. [address] [place] [address], [place] [address]. All rights reserved. This information is not intended as a substitute for professional medical care. Always follow your healthcare professional's instructions.

## 2021-06-18 NOTE — PATIENT INSTRUCTIONS - HE
Patient Instructions by Juanito Beckman MD at 12/18/2020  7:20 AM     Author: Juanito Beckman MD Service: -- Author Type: Physician    Filed: 12/18/2020  7:49 AM Encounter Date: 12/18/2020 Status: Addendum    : Juanito Beckman MD (Physician)    Related Notes: Original Note by Juanito Beckman MD (Physician) filed at 12/18/2020  7:48 AM         Patient Education     [facility] [phone]    Urinary Tract Infections in Women    Urinary tract infections (UTIs) are most often caused by bacteria. These bacteria enter the urinary tract. The bacteria may come from inside the body. Or they may travel from the skin outside the rectum or vagina into the urethra. Female anatomy makes it easy for bacteria from the bowel to enter a womans urinary tract, which is the most common source of UTI. This means women develop UTIs more often than men. Pain in or around the urinary tract is a common UTI symptom. But the only way to know for sure if you have a UTI for the healthcare provider to test your urine. The two tests that may be done are the urinalysis and urine culture.  Types of UTIs    Cystitis. A bladder infection (cystitis) is the most common UTI in women. You may have urgent or frequent need to pee. You may also have pain, burning when you pee, and bloody urine.    Urethritis. This is an inflamed urethra, which is the tube that carries urine from the bladder to outside the body. You may have lower stomach or back pain. You may also have urgent or frequent need to pee.    Pyelonephritis. This is a kidney infection. If not treated, it can be serious and damage your kidneys. In severe cases, you may need to stay in the hospital. You may have a fever and lower back pain.    Medicines to treat a UTI  Most UTIs are treated with antibiotics. These kill the bacteria. The length of time you need to take them depends on the type of infection. It may be as short as 3 days. If you have repeated UTIs, you may need a  low-dose antibiotic for several months. Take antibiotics exactly as directed. Dont stop taking them until all of the medicine is gone. If you stop taking the antibiotic too soon, the infection may not go away. You may also develop a resistance to the antibiotic. This can make it much harder to treat.  Lifestyle changes to treat and prevent UTIs  The lifestyle changes below will help get rid of your UTI. They may also help prevent future UTIs.    Drink plenty of fluids. This includes water, juice, or other caffeine-free drinks. Fluids help flush bacteria out of your body.    Empty your bladder. Always empty your bladder when you feel the urge to pee. And always pee before going to sleep. Urine that stays in your bladder can lead to infection. Try to pee before and after sex as well.    Practice good personal hygiene. Wipe yourself from front to back after using the toilet. This helps keep bacteria from getting into the urethra.    Use condoms during sex. These help prevent UTIs caused by sexually transmitted bacteria. Also don't use spermicides during sex. These can increase the risk for UTIs. Choose other forms of birth control instead. For women who tend to get UTIs after sex, a low-dose of a preventive antibiotic may be used. Be sure to discuss this option with your healthcare provider.    Follow up with your healthcare provider as directed. He or she may test to make sure the infection has cleared. If needed, more treatment may be started.  Date Last Reviewed: 7/1/2019 2000-2019 [employer]. [address] [place] [address], [place] [id number]. All rights reserved. This information is not intended as a substitute for professional medical care. Always follow your healthcare professional's instructions.

## 2021-06-18 NOTE — PATIENT INSTRUCTIONS - HE
"Patient Instructions by Jovanni Lewis PA-C at 4/5/2021 10:50 AM     Author: Jovanni Lewis PA-C Service: -- Author Type: Physician Assistant    Filed: 4/5/2021 11:21 AM Encounter Date: 4/5/2021 Status: Signed    : Jovanni Lewis PA-C (Physician Assistant)         Patient Education     Bladder Infection, Female (Adult)    Urine is normally doesn't have any bacteria in it. But bacteria can get into the urinary tract from the skin around the rectum. Or they can travel in the blood from elsewhere in the body. Once they are in your urinary tract, they can cause infection in the urethra (urethritis), the bladder (cystitis), or the kidneys (pyelonephritis).  The most common place for an infection is in the bladder. This is called a bladder infection. This is one of the most common infections in women. Most bladder infections are easily treated. They are not serious unless the infection spreads to the kidney.  The phrases \"bladder infection,\" \"UTI,\" and \"cystitis\" are often used to describe the same thing. But they are not always the same. Cystitis is an inflammation of the bladder. The most common cause of cystitis is an infection.  Symptoms  The infection causes inflammation in the urethra and bladder. This causes many of the symptoms. The most common symptoms of a bladder infection are:    Pain or burning when urinating    Having to urinate more often than usual    Urgent need to urinate    Only a small amount of urine comes out    Blood in urine    Abdominal discomfort. This is usually in the lower abdomen above the pubic bone.    Cloudy urine    Strong- or bad-smelling urine    Unable to urinate (urinary retention)    Unable to hold urine in (urinary incontinence)    Fever    Loss of appetite    Confusion (in older adults)  Causes  Bladder infections are not contagious. You can't get one from someone else, from a toilet seat, or from sharing a bath.  The most common cause of bladder " infections is bacteria from the bowels. The bacteria get onto the skin around the opening of the urethra. From there, they can get into the urine and travel up to the bladder, causing inflammation and infection. This usually happens because of:    Wiping improperly after urinating. Always wipe from front to back.    Bowel incontinence    Pregnancy    Procedures such as having a catheter inserted    Older age    Not emptying your bladder. This can allow bacteria a chance to grow in your urine.    Dehydration    Constipation    Sex    Use of a diaphragm for birth control   Treatment  Bladder infections are diagnosed by a urine test. They are treated with antibiotics and usually clear up quickly without complications. Treatment helps prevent a more serious kidney infection.  Medicines  Medicines can help in the treatment of a bladder infection:    Take antibiotics until they are used up, even if you feel better. It is important to finish them to make sure the infection has cleared.    You can use acetaminophen or ibuprofen for pain, fever, or discomfort, unless another medicine was prescribed. If you have chronic liver or kidney disease, talk with your healthcare provider before using these medicines. Also talk with your provider if you've ever had a stomach ulcer or gastrointestinal bleeding, or are taking blood-thinner medicines.    If you are given phenazopydridine to reduce burning with urination, it will cause your urine to become a bright orange color. This can stain clothing.  Care and prevention  These self-care steps can help prevent future infections:    Drink plenty of fluids to prevent dehydration and flush out your bladder. Do this unless you must restrict fluids for other health reasons, or your doctor told you not to.    Proper cleaning after going to the bathroom is important. Wipe from front to back after using the toilet to prevent the spread of bacteria.    Urinate more often. Don't try to hold urine  in for a long time.    Wear loose-fitting clothes and cotton underwear. Avoid tight-fitting pants.    Improve your diet and prevent constipation. Eat more fresh fruit and vegetables, and fiber, and less junk and fatty foods.    Avoid sex until your symptoms are gone.    Avoid caffeine, alcohol, and spicy foods. These can irritate your bladder.    Urinate right after intercourse to flush out your bladder.    If you use birth control pills and have frequent bladder infections, discuss it with your doctor.  Follow-up care  Call your healthcare provider if all symptoms are not gone after 3 days of treatment. This is especially important if you have repeat infections.  If a culture was done, you will be told if your treatment needs to be changed. If directed, you can call to find out the results.  If X-rays were done, you will be told if the results will affect your treatment.  Call 911  Call 911 if any of the following occur:    Trouble breathing    Hard to wake up or confusion    Fainting or loss of consciousness    Rapid heart rate  When to seek medical advice  Call your healthcare provider right away if any of these occur:    Fever of 100.4 F (38.0 C) or higher, or as directed by your healthcare provider    Symptoms are not better by the third day of treatment    Back or belly (abdominal) pain that gets worse    Repeated vomiting, or unable to keep medicine down    Weakness or dizziness    Vaginal discharge    Pain, redness, or swelling in the outer vaginal area (labia)  Date Last Reviewed: 10/1/2016    [id number] [employer]. [address] [place] [address], [place] [address]. All rights reserved. This information is not intended as a substitute for professional medical care. Always follow your healthcare professional's instructions.

## 2021-06-18 NOTE — PATIENT INSTRUCTIONS - HE
Patient Instructions by Afshin Mike MD at 10/5/2020  4:50 PM     Author: Afshin Mike MD Service: -- Author Type: Physician    Filed: 10/5/2020  5:21 PM Encounter Date: 10/5/2020 Status: Addendum    : Afshin Mike MD (Physician)    Related Notes: Original Note by Afshin Mike MD (Physician) filed at 10/5/2020  5:21 PM       -Temporarily double your cetirizine dose to 10 mg two times a day.  -Take prednisone as directed.  -Take hydroxyzine as needed for itching.  Patient Education     Hives (Adult)  Hives are pink or red bumps on the skin. These bumps are also known as wheals. The bumps can itch, burn, or sting. Hives can occur anywhere on the body. They vary in size and shape and can form in clusters. Individual hives can appear and go away quickly. New hives may develop as old ones fade. Hives are common and usually harmless. Occasionally hives are a sign of a serious allergy.  Hives are often caused by an allergic reaction. It may be an allergic reaction to foods such as fruit, shellfish, chocolate, nuts, or tomatoes. It may be a reaction to pollens, animal fur, or mold spores. Medicines, chemicals, and insect bites can also cause hives. And hives can be caused by hot sun or cold air. The cause of hives can be difficult to find.  You may be given medicines to relieve swelling and itching. Follow all instructions when using these medicines. The hives will usually fade in a few days, but can last up to 2 weeks.  Home care  Follow these tips:    Try to find the cause of the hives and eliminate it. Discuss possible causes with your healthcare provider. Future reactions to the same allergen may be worse.    Dont scratch the hives. Scratching will delay healing. To reduce itching, apply cool, wet compresses to the skin.    Dress in soft, loose cotton clothing.    Dont bathe in hot water. This can make the itching worse.    Apply an ice pack or cool pack wrapped in a thin towel to your  skin. This will help reduce redness and itching. But if your hives were caused by exposure to cold, then do not apply more cold to them.    You may use over-the counter antihistamines to reduce itching. Some older antihistamines, such as diphenhydramine and chlorpheniramine, are inexpensive. But they need to be taken often and may make you sleepy. They are best used at bedtime. Dont use diphenhydramine if you have glaucoma or have trouble urinating because of an enlarged prostate. Newer antihistamines, such as loratadine, cetirizine, and fexofenadine, are generally more expensive. But they tend to have fewer side effects, such as drowsiness. They can be taken less often.    Another type of antihistamine is used to treat heartburn. This type includes ranitidine, nizatidine, famotidine, and cimetidine. These are sometimes used along with the above antihistamines if a single medicine is not working.  Follow-up care  Follow up with your healthcare provider if your symptoms don't get better in 2 days. Ask your provider about allergy testing if you have had a severe reaction, or have had several episodes of hives. He or she can use the allergy testing to find out what you are allergic to.  When to seek medical advice  Call your healthcare provider right away if any of these occur:    Fever of 100.4 F (38.0 C) or higher, or as directed by your healthcare provider    Redness, swelling, or pain    Foul-smelling fluid coming from the rash  Call 911  Call 911 if any of the following occur:    Swelling of the face, throat, or tongue    Trouble breathing or swallowing    Dizziness, weakness, or fainting  Date Last Reviewed: 9/1/2016 2000-2017 [employer]. [address] [place] [address], [place] [address]. All rights reserved. This information is not intended as a substitute for professional medical care. Always follow your healthcare professional's instructions.

## 2021-06-19 NOTE — LETTER
Letter by Rosanne Velasquez MD at      Author: Rosanne Velasquez MD Service: -- Author Type: --    Filed:  Encounter Date: 7/26/2019 Status: (Other)         July 26, 2019     Chacorta Layton MD  [address] [facility] [address]    Patient: Trenton Milian   MR Number: [id number]   YOB: 1960   Date of Visit: 7/26/2019     Dear Dr. Calin MD:    Thank you for referring Trenton Milian to me for evaluation.  She is interested in starting allergy shots, however, she was only positive to grass and dog on skin testing.  I asked her to undergo specific IgE testing and obtaining records from her outside allergist.  I have included my note for your review.    If you have questions, please do not hesitate to call me.     Sincerely,        Rosanne Velasquez MD        CC  No Recipients    Progress Notes:Chief complaint: Establish allergy care    History of present illness: This is a 59-year-old woman I was asked to see by Dr. Layton for evaluation of allergies.  She states she had a lifelong history of allergies.  She states her symptoms consist of itchy eyes, sneezing and drainage.  She has repeated sinus infections as well.  She is been on allergy shots before but her  was in the .  For this reason, they moved multiple times.  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  She is wanting to restart allergy shots.  She states she was positive to multiple aeroallergens in [place] where she received her allergy shots.  She has had sinus surgery.  She had this done in 1994.  She reports she takes Zyrtec daily.  She has a lot of itching.  She takes the Zyrtec and it seems to help but she will have some breakthrough symptoms.  She was told she had a history of asthma previously.  Recently she was told she did not have asthma.  She did need her rescue inhaler this morning, however.  She does not use nasal sprays or rinses.  She reports they did not seem to  "help.    Past medical history: Lupus anticoagulant, GERD    Social history: She lives in a home with center in a basement.  They are retiring here in [place].  She is a smoker.  She has a dog.    Family history: Mother, brother with allergies and grandmother with asthma    Review of Systems performed as above and the remainder is negative.       Current Outpatient Medications:   ?  calcium carbonate-vitamin D3 (CALTRATE 600 PLUS D3) 600 mg(1,500mg) -400 unit per tablet, , Disp: , Rfl:   ?  CERAVE Crea cream, , Disp: , Rfl:   ?  omeprazole (PRILOSEC) 20 MG capsule, , Disp: , Rfl:   ?  PROAIR HFA 90 mcg/actuation inhaler, , Disp: , Rfl:   ?  sertraline (ZOLOFT) 25 MG tablet, , Disp: , Rfl:   ?  VOLTAREN 1 % Gel, , Disp: , Rfl:   ?  XARELTO 20 mg tablet, , Disp: , Rfl:   ?  cetirizine (ZYRTEC) 10 MG tablet, , Disp: , Rfl:   ?  clobetasol-emollient (OLUX-E) 0.05 % topical foam, Apply topically 2 (two) times a day., Disp: , Rfl:   ?  diazePAM (VALIUM) 5 MG tablet, , Disp: , Rfl:   ?  fluticasone propionate (FLONASE) 50 mcg/actuation nasal spray, , Disp: , Rfl:   ?  gabapentin (NEURONTIN) 300 MG capsule, , Disp: , Rfl:   ?  MUCINEX 600 mg 12 hr tablet, , Disp: , Rfl:     Allergies   Allergen Reactions   ? Augmentin [Amoxicillin-Pot Clavulanate]      itchy   ? Fentanyl      itchy   ? Penicillins      itchy   ? Septra [Sulfamethoxazole-Trimethoprim]      itchy   ? Tramadol      itchy   ? Zyban [Bupropion Hcl]      Personality Change       BP 92/59   Pulse 84   Ht 5' 3\" (1.6 m)   Wt 139 lb (63 kg)   LMP 06/27/1989   BMI 24.62 kg/m     Gen: Pleasant female not in acute distress  HEENT: Eyes no erythema of the bulbar or palpebral conjunctiva, no edema. Ears: TMs well visualized, no effusions. Nose: No congestion, mucosa normal. Mouth: Throat clear, no lip or tongue edema.   Cardiac: Regular rate and rhythm, no murmurs, rubs or gallops  Respiratory: Clear to auscultation bilaterally, no adventitious breath sounds  Lymph: " No supraclavicular or cervical lymphadenopathy  Skin: No rashes or lesions  Psych: Alert and oriented times 3    Last Percutaneous Allergy Test Results  Trees  Mayank, White  1:20 H  (W/F in mm): 0-0 (07/26/19 1053)  Birch Mix 1:20 H (W/F in mm): 0-0 (07/26/19 1053)  Pownal, Common 1:20 H (W/F in mm): 0-0 (07/26/19 1053)  Elm, American 1:20 H (W/F in mm): 0-0 (07/26/19 1053)  Red Willow, Shagbark 1:20 H (W/F in mm): 0-0 (07/26/19 1053)  Maple, Hard/Sugar 1:20 H (W/F in mm): 0-0 (07/26/19 1053)  Farley Mix 1:20 H (W/F in mm): 0-0 (07/26/19 1053)  Oak, Red 1:20 H (W/F in mm): 0-0 (07/26/19 1053)  Fort Valley, American 1:20 H (W/F in mm): 0-0 (07/26/19 1053)  Beatrice Tree 1:20 H (W/F in mm): 0-0 (07/26/19 1053)  Dust Mites  D. Pteronyssinus Mite 30,000 AU/ML H (W/F in mm): 0-0 (07/26/19 1053)  D. Farinae Mite 30,000 AU/ML H (W/F in mm: 0-0 (07/26/19 1053)  Grasses  Grass Mix #4 10,000 BAU/ML H: 4-15 (07/26/19 1053)  Philip Grass 1:20 H (W/F in mm): 0-0 (07/26/19 1053)  Cockroach  Cockroach Mix 1:10 H (W/F in mm): 0-0 (07/26/19 1053)  Molds/Fungi  Alternaria Tenuis 1:10 H (W/F in mm): 0-0 (07/26/19 1053)  Aspergillus Fumigatus 1:10 H (W/F in mm): 0-0 (07/26/19 1053)  Homodendrum Cladosporioides 1:10 H (W/F in mm): 0-0 (07/26/19 1053)  Penicillin Notatum 1:10 H (W/F in mm): 0-0 (07/26/19 1053)  Epicoccum 1:10 H (W/F in mm): 0-0 (07/26/19 1053)  Weeds  Ragweed, Short 1:20 H (W/F in mm): 0-0 (07/26/19 1053)  Dock, [place] 1:20 H (W/F in mm): 0-0 (07/26/19 1053)  Lamb's Quarter 1:20 H (W/F in mm): 0-0 (07/26/19 1053)  Pigweed, Rough Red Root 1:20 H  (W/F in mm): 0-0 (07/26/19 1053)  Plantain, English 1:20 H  (W/F in mm): 0-0 (07/26/19 1053)  Sagebrush, Mugwort 1:20 H  (W/F in mm): 0-0 (07/26/19 1053)  Animal  Cat 10,000 BAU/ML H (W/F in mm): 0-0 (07/26/19 1053)  Dog 1:10 H (W/F in mm): 4-15 (07/26/19 1053)  Controls  Device Type: QUINTIP (07/26/19 1053)  Neg. control: 50% Glycerine/Saline H (W/F in mm): 0-0 (07/26/19  5073)  Pos. control: Histamine 6mg/ML (W/F in mms): 5-20 (07/26/19 1053)    Impression report and plan:  1.  Allergic rhinitis    Testing only positive for dog and grass.  I would like to check specific IgE testing and obtain her records from [place].  Pending these results, could consider allergy shots.  I went over the risks and benefits of allergy shots.  I stated risks include hives, swelling, shortness of breath.  I did state that one in 2.5 million shot administrations can result in death.  I stated they must wait in the office for 30 minutes following the shot and carry an epinephrine device on the day of the shot.  I stated that shots are effective in about 90% of patients.  I stated that they should check with the insurance company prior to proceeding.  They understand the risks and benefits and will let me know if she would like to proceed.  Consent forms with her.    2.  Itching    Recommended increasing Zyrtec to twice daily.  Stated this is often not due to a specific allergic trigger.  She can increase her take up to 2 tablets twice daily if she so desires.  Went over specific triggers for chronic itch.

## 2021-06-19 NOTE — LETTER
Letter by Verito Bejarano MD at      Author: Verito Bejarano MD Service: -- Author Type: --    Filed:  Encounter Date: 7/8/2019 Status: (Other)       Dear Trenton Milian    Thank you for choosing [facility] for your care.  We are committed to providing you with the highest quality and compassionate healthcare services.  The following information pertains to your first appointment with our clinic.    Date/Time of appointment:  Friday, July 26, 2019 at 12:45 pm.      Note: Please arrive 15 minutes prior to your appointment time.  This allows time to complete forms, possible labs and nursing assessment.    Name of your Physician:  Verito Bejarano MD    What to bring to your appointment:    Completed Patient History/Initial Nursing Assessment and Medication/Allergy List (these forms were sent to you).    Any paperwork or films from your physician that we have asked you to bring.    Your current insurance card(s).    Parking:    Please refer to the map included to direct you to [facility].    You can park in any visitor/patient parking area you choose. There is no charge for parking at [facility].     Enter the hospital at the front/main entrance.      Please check in with our  representatives who will escort you to the clinic located in [address] of the [facility].    We hope these instructions are helpful to you.  If you have any questions or concerns, please call us at [phone].  It is our pleasure to assist you.    Warm Regards,      Keyla Darby    [phone]

## 2021-06-20 ENCOUNTER — HEALTH MAINTENANCE LETTER (OUTPATIENT)
Age: 61
End: 2021-06-20

## 2021-06-20 NOTE — LETTER
Letter by Severson, Tammie F, LPN at      Author: Severson, Tammie F, LPN Service: -- Author Type: --    Filed:  Encounter Date: 7/23/2020 Status: (Other)       7/23/2020        Trenton Echeverriaalie  [address] [facility] [address]    This letter provides a written record that you were tested for COVID-19 on 7/17/20.     Your result was negative. This means that we didnt find the virus that causes COVID-19 in your sample. A test may show negative when you do actually have the virus. This can happen when the virus is in the early stages of infection, before you feel illness symptoms.    If you have symptoms   Stay home and away from others (self-isolate) until you meet ALL of the guidelines below:    Youve had no fever--and no medicine that reduces fever--for 3 full days (72 hours). And ?    Your other symptoms have gotten better. For example, your cough or breathing has improved. And?    At least 10 days have passed since your symptoms started.    During this time:    Stay home. Dont go to work, school or anywhere else.     Stay in your own room, including for meals. Use your own bathroom if you can.    Stay away from others in your home. No hugging, kissing or shaking hands. No visitors.    Clean high touch surfaces often (doorknobs, counters, handles, etc.). Use a household cleaning spray or wipes. You can find a full list on the EPA website at www.epa.gov/pesticide-registration/list-n-disinfectants-use-against-sars-cov-2.    Cover your mouth and nose with a mask, tissue or washcloth to avoid spreading germs.    Wash your hands and face often with soap and water.    Going back to work  Check with your employer for any guidelines to follow for going back to work.    Employers: This document serves as formal notice that your employee tested negative for COVID-19, as of the testing date shown above.

## 2021-06-22 ENCOUNTER — OFFICE VISIT - HEALTHEAST (OUTPATIENT)
Dept: FAMILY MEDICINE | Facility: CLINIC | Age: 61
End: 2021-06-22

## 2021-06-22 ENCOUNTER — HOSPITAL ENCOUNTER (OUTPATIENT)
Dept: ULTRASOUND IMAGING | Facility: CLINIC | Age: 61
Discharge: HOME | End: 2021-06-22
Payer: OTHER GOVERNMENT

## 2021-06-22 DIAGNOSIS — M79.672 PAIN IN LEFT FOOT: ICD-10-CM

## 2021-06-22 DIAGNOSIS — M79.672 LEFT FOOT PAIN: ICD-10-CM

## 2021-06-24 ENCOUNTER — COMMUNICATION - HEALTHEAST (OUTPATIENT)
Dept: FAMILY MEDICINE | Facility: CLINIC | Age: 61
End: 2021-06-24

## 2021-06-25 NOTE — PROGRESS NOTES
Assessment & Plan     Insomnia, unspecified type  - traZODone (DESYREL) 50 MG tablet  Dispense: 90 tablet; Refill: 0    Restless leg syndrome  - Iron and Transferrin Iron Binding Capacity  - HM1(CBC and Differential)  - Vitamin B12  - Folate, Serum  - Ambulatory referral to Neurology    Cervical pain (neck)  - diazePAM (VALIUM) 10 MG tablet  Dispense: 30 tablet; Refill: 5    Am concerned that in addition to the restless leg that could be some of the abnormality causing jerking movement of the upper extremities.  Because of that and the restless leg I will refer her to the neurology but in the meantime we will get labs as noted above.  Review of the labs did show increased red blood cell volume and will get labs as noted as well.  Discussed sleep issues which could be a result of the jerking as well as the restless leg, I will have her try trazodone since she did not have any improvement with zolpidem.  Regarding Covid testing she does have an order already and she will call to get an appt for that.     :[id number]}     Tobacco Cessation:   reports that she has been smoking cigarettes. She has a 20.00 pack-year smoking history. She has never used smokeless tobacco.    No follow-ups on file.    Chacorta Layton MD  [facility]   Trenton Milian is 60 y.o. and presents today for the following health issues   HPI   She comes in today with concerns of having some difficulty with sleeping, which she noted is is a result of having restless leg syndrome.  Noted that she will have the need to move her legs when she is able to sleep, and this has begun to affect the hands and upper extremities.  We have tried for her to try using gabapentin and that she was able to take up to 600 mg at night after some time it was not helping.  She had also used zolpidem for sleep that too does not help because of that she has not been sleeping very well.  Noted having this movement in  the upper extremities as well that is also causing more problems she feels that has something else going on that she is not aware of.  She takes her diazepam intermittently which will help her with sleep.  She takes it for jaw tightness.   She also wants to have an order written for Covid due to  Travel.      Past Medical History:   Diagnosis Date     Asthma      Depression      Environmental allergies      Esophageal stricture 09/15/2014    EGD done at [facility].     Gastric ulcer 09/15/2014    EGD done at [facility].     GERD (gastroesophageal reflux disease)      Left leg DVT (H)      Lupus (H)      Past Surgical History:   Procedure Laterality Date     ABDOMINOPLASTY       APPENDECTOMY        SECTION       ESOPHAGOGASTRODUODENOSCOPY  09/15/2014    Esophageal stricture and gastric ulceration.     EXCISION / CURETTAGE BONE CYST PHALANGES OF FOOT Left 2008     MANDIBLE SURGERY  2007    lower jaw advancement. Left over numbness of tongue and gums.Totally 5 surgeries.     OVARIAN CYST SURGERY Right      SINUS SURGERY Bilateral      TOTAL ABDOMINAL HYSTERECTOMY W/ BILATERAL SALPINGOOPHORECTOMY      for ovarian infection.     Social History     Socioeconomic History     Marital status:      Spouse name: Not on file     Number of children: Not on file     Years of education: Not on file     Highest education level: Not on file   Occupational History     Employer: RETIRED   Social Needs     Financial resource strain: Not on file     Food insecurity     Worry: Not on file     Inability: Not on file     Transportation needs     Medical: Not on file     Non-medical: Not on file   Tobacco Use     Smoking status: Current Every Day Smoker     Packs/day: 0.50     Years: 40.00     Pack years: 20.00     Types: Cigarettes     Smokeless tobacco: Never Used   Substance and Sexual Activity     Alcohol use: Never     Frequency: Never     Drug use: Never     Sexual  activity: Yes     Partners: Male   Lifestyle     Physical activity     Days per week: Not on file     Minutes per session: Not on file     Stress: Not on file   Relationships     Social connections     Talks on phone: Not on file     Gets together: Not on file     Attends Tenriism service: Not on file     Active member of club or organization: Not on file     Attends meetings of clubs or organizations: Not on file     Relationship status: Not on file     Intimate partner violence     Fear of current or ex partner: Not on file     Emotionally abused: Not on file     Physically abused: Not on file     Forced sexual activity: Not on file   Other Topics Concern     Not on file   Social History Narrative     Not on file     Family History   Problem Relation Age of Onset     Hypertension Mother      Thyroid disease Mother      Arthritis Mother      Breast cancer Mother 85        breast cancer     Hypertension Father      Heart disease Father         Valve replacement ,bypass     Arthritis Father      Gout Son      Breast cancer Maternal Grandmother      Other Maternal Grandmother         some sort blood clot.     Breast cancer Paternal Aunt      Anxiety disorder Brother      Obesity Brother      Colon cancer Brother         Rectal Cancer.     Gout Son      Allergies   Allergen Reactions     Augmentin [Amoxicillin-Pot Clavulanate]      itchy     Clindamycin      Fentanyl      itchy     Penicillins      itchy     Septra [Sulfamethoxazole-Trimethoprim]      itchy     Tramadol      itchy     Zyban [Bupropion Hcl]      Personality Change     Current Outpatient Medications   Medication Sig Dispense Refill     calcium carbonate-vitamin D3 (CALTRATE 600 PLUS D3) 600 mg(1,500mg) -400 unit per tablet        CERAVE Crea cream        cetirizine (ZYRTEC) 10 MG tablet Take 1 tablet (10 mg total) by mouth daily. 90 tablet 1     diazePAM (VALIUM) 10 MG tablet TAKE 1/2 TO 1 TABLET(5 TO 10 MG) BY MOUTH AT BEDTIME AS NEEDED 30 tablet 5      LINZESS 72 mcg cap capsule        MUCINEX 600 mg 12 hr tablet        omeprazole (PRILOSEC) 20 MG capsule Take 1 capsule (20 mg total) by mouth daily before breakfast. 90 capsule 1     PROAIR HFA 90 mcg/actuation inhaler        sertraline (ZOLOFT) 25 MG tablet Take 1 tablet (25 mg total) by mouth daily. 90 tablet 1     VOLTAREN 1 % Gel Apply 2 g topically 2 (two) times a day as needed (to Feet). 150 g 2     XARELTO 20 mg tablet Take 1 tablet (20 mg total) by mouth daily. 90 tablet 2     gabapentin (NEURONTIN) 300 MG capsule 300 mg.              traZODone (DESYREL) 50 MG tablet Take 1 tablet (50 mg total) by mouth at bedtime. 90 tablet 0     No current facility-administered medications for this visit.            Review of Systems   Constitutional: Positive for fatigue. Negative for activity change, appetite change and diaphoresis.   Respiratory: Negative for chest tightness.    Cardiovascular: Negative for palpitations.   Musculoskeletal: Negative.    Neurological: Negative for dizziness, seizures and headaches.   Psychiatric/Behavioral: Positive for sleep disturbance.           Objective    /62 (Patient Site: Left Arm, Patient Position: Sitting, Cuff Size: Adult Regular)   Pulse 78   Wt 142 lb 1.6 oz (64.5 kg)   LMP 06/27/1989   SpO2 97%   BMI 24.97 kg/m    Body mass index is 24.97 kg/m .  Physical Exam   Constitutional: She is oriented to person, place, and time. She appears well-developed and well-nourished. No distress.   She does have intermittent movement of the upper and lower extremities for the upper extremity we can actually feel the jerking movements.  And because of that she will stand up intermittently while being seen.   Neck: Normal range of motion.   Cardiovascular: Normal rate and regular rhythm.   Pulmonary/Chest: Effort normal and breath sounds normal.   Lymphadenopathy:     She has no cervical adenopathy.   Neurological: She is alert and oriented to person, place, and time.

## 2021-06-25 NOTE — TELEPHONE ENCOUNTER
Refill Approved    Rx renewed per Medication Renewal Policy. Medication was last renewed on 9/15/2020 with 1 refill.  Last office visit: 6/1/2021 with PCP Dr ROSY Baig, [facility] Triage/Med Refill 6/12/2021     Requested Prescriptions   Pending Prescriptions Disp Refills     sertraline (ZOLOFT) 25 MG tablet [Pharmacy Med Name: SERTRALINE HCL TABS 25MG] 90 tablet 3     Sig: TAKE 1 TABLET DAILY       [facility] Refill Protocol  Passed - 6/10/2021  7:38 AM        Passed - PCP or prescribing provider visit in last year     Last office visit with prescriber/PCP: 6/1/2021 Chacorta Layton MD OR same dept: 6/1/2021 Chacorta Layton MD OR same specialty: 6/1/2021 Chacorta Layton MD  Last physical: 9/15/2020 Last MTM visit: Visit date not found   Next visit within 3 mo: Visit date not found  Next physical within 3 mo: Visit date not found  Prescriber OR PCP: Chacorta Layton MD  Last diagnosis associated with med order: 1. Anxiety and depression  - sertraline (ZOLOFT) 25 MG tablet [Pharmacy Med Name: SERTRALINE HCL TABS 25MG]; TAKE 1 TABLET DAILY  Dispense: 90 tablet; Refill: 3    If protocol passes may refill for 12 months if within 3 months of last provider visit (or a total of 15 months).

## 2021-06-26 NOTE — PROGRESS NOTES
"  Assessment & Plan:       1. Left foot pain  US Venous Leg Left      Medical Decision Making  Patient with history of blood clots presents with acute onset left foot and ankle pain.  Ultrasound was negative for DVT.  Patient did show signs consistent with a localized hematoma to the plantar third MTP joint.  Feel like this likely developed from overuse injury as patient was walking a lot while on vacation.  Recommend rest, ice, compression, elevation, and acetaminophen as needed.  No signs of cellulitis.  Low concern for fracture as patient had no trauma or injury to the region.  Discussed signs of worsening symptoms and when to follow-up with PCP if no symptom improvement.      Subjective:       Trenton Milian is a 60 y.o. female here for evaluation of left ankle and foot pain.  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  She is on chronic Xarelto.  Patient recently returned from a flight to [place].  Flight returned this morning and patient has been noticing pain at the base of her left foot described as a \"ball\".  She is now getting worse pain and swelling of the left ankle.  She states this is presenting very similarity to her previous DVT 4 years ago.  Patient otherwise denies any known trauma or injury to the region.  She was doing increased walking during her travels to [place], but felt fine at that time.    The following portions of the patient's history were reviewed and updated as appropriate: allergies, current medications and problem list.    Review of Systems  Pertinent items are noted in HPI.     Allergies  Allergies   Allergen Reactions     Augmentin [Amoxicillin-Pot Clavulanate]      itchy     Clindamycin      Fentanyl      itchy     Penicillins      itchy     Septra [Sulfamethoxazole-Trimethoprim]      itchy     Tramadol      itchy     Zyban [Bupropion Hcl]      Personality Change       Family History   Problem Relation Age of Onset     Hypertension Mother      Thyroid " disease Mother      Arthritis Mother      Breast cancer Mother 85        breast cancer     Hypertension Father      Heart disease Father         Valve replacement ,bypass     Arthritis Father      Gout Son      Breast cancer Maternal Grandmother      Other Maternal Grandmother         some sort blood clot.     Breast cancer Paternal Aunt      Anxiety disorder Brother      Obesity Brother      Colon cancer Brother         Rectal Cancer.     Gout Son        Social History     Socioeconomic History     Marital status:      Spouse name: None     Number of children: None     Years of education: None     Highest education level: None   Occupational History     Employer: RETIRED   Social Needs     Financial resource strain: None     Food insecurity     Worry: None     Inability: None     Transportation needs     Medical: None     Non-medical: None   Tobacco Use     Smoking status: Current Every Day Smoker     Packs/day: 0.50     Years: 40.00     Pack years: 20.00     Types: Cigarettes     Smokeless tobacco: Never Used   Substance and Sexual Activity     Alcohol use: Never     Frequency: Never     Drug use: Never     Sexual activity: Yes     Partners: Male   Lifestyle     Physical activity     Days per week: None     Minutes per session: None     Stress: None   Relationships     Social connections     Talks on phone: None     Gets together: None     Attends Zoroastrian service: None     Active member of club or organization: None     Attends meetings of clubs or organizations: None     Relationship status: None     Intimate partner violence     Fear of current or ex partner: None     Emotionally abused: None     Physically abused: None     Forced sexual activity: None   Other Topics Concern     None   Social History Narrative     None         Objective:       /56 (Patient Site: Left Arm, Patient Position: Sitting, Cuff Size: Adult Regular)   Pulse (!) 104   Temp 98.7  F (37.1  C) (Oral)   Resp 18   Wt 146 lb  8 oz (66.5 kg)   LMP 06/27/1989   SpO2 97%   BMI 25.75 kg/m    General appearance: alert, appears stated age, cooperative, no distress and non-toxic  Extremities: Left lower extremity: No obvious trauma or deformity, full range of motion of all ankle and foot joints without difficulty; tenderness to palpation of the lower calf as well as tenderness to palpation along the plantar third MTP joint  Skin: Left foot: Region of localized swelling about 2 cm in diameter at the plantar third MTP joint with signs of minimal ecchymosis; no significant increased warmth to touch.  Left calf: Left calf is slightly swollen compared to right, no erythema, no increased warmth to touch    Imaging    Us Venous Leg Left    Result Date: 6/22/2021  EXAM: US VENOUS LEG LEFT LOCATION: [facility] DATE/TIME: 6/22/2021 5:24 PM INDICATION: Swelling and pain in left foot and ankle; suspect DVT COMPARISON: None. TECHNIQUE: Venous Duplex ultrasound of the left lower extremity with and without compression, augmentation and duplex. Color flow and spectral Doppler with waveform analysis performed. FINDINGS: Exam includes the common femoral, femoral, popliteal, and contralateral common femoral veins as well as segmentally visualized deep calf veins and greater saphenous vein. LEFT: No deep vein thrombosis. No superficial thrombophlebitis. No popliteal cyst.     1.  No deep venous thrombosis in the left lower extremity.    Discussed findings with the patient.

## 2021-07-03 NOTE — ADDENDUM NOTE
Addendum Note by Sharla Thompson at 9/4/2019  1:45 PM     Author: Sharla Thompson Service: -- Author Type:     Filed: 9/24/2019 11:51 AM Encounter Date: 9/4/2019 Status: Signed    : Sharla Thompson ()    Addended by: SHARLA THOMPSON on: 9/24/2019 11:51 AM        Modules accepted: Orders

## 2021-07-05 NOTE — TELEPHONE ENCOUNTER
Telephone Encounter by Chacorta Layton MD at 7/5/2021  4:47 PM     Author: Chacorta Layton MD Service: -- Author Type: Physician    Filed: 7/5/2021  4:47 PM Encounter Date: 4/7/2021 Status: Signed    : Chacorta Layton MD (Physician)         This encounter was created in error - please disregard.

## 2021-07-05 NOTE — PROGRESS NOTES
Progress Notes by Chacorta Layton MD at 4/9/2021  5:27 PM     Author: Chacorta Layton MD Service: -- Author Type: Physician    Filed: 7/5/2021  4:47 PM Encounter Date: 4/7/2021 Status: Signed    : Chacorta Layton MD (Physician)       Provider E-Visit time total (minutes): Vist was not completed.

## 2021-07-06 VITALS
HEART RATE: 104 BPM | TEMPERATURE: 98.7 F | DIASTOLIC BLOOD PRESSURE: 56 MMHG | RESPIRATION RATE: 18 BRPM | OXYGEN SATURATION: 97 % | BODY MASS INDEX: 25.75 KG/M2 | SYSTOLIC BLOOD PRESSURE: 100 MMHG | WEIGHT: 146.5 LBS

## 2021-07-06 VITALS
DIASTOLIC BLOOD PRESSURE: 62 MMHG | HEART RATE: 78 BPM | WEIGHT: 142.1 LBS | OXYGEN SATURATION: 97 % | BODY MASS INDEX: 24.97 KG/M2 | SYSTOLIC BLOOD PRESSURE: 102 MMHG

## 2021-07-06 ASSESSMENT — PATIENT HEALTH QUESTIONNAIRE - PHQ9: SUM OF ALL RESPONSES TO PHQ QUESTIONS 1-9: 13

## 2021-07-08 ASSESSMENT — ANXIETY QUESTIONNAIRES: GAD7 TOTAL SCORE: 7

## 2021-07-29 ENCOUNTER — TRANSFERRED RECORDS (OUTPATIENT)
Dept: HEALTH INFORMATION MANAGEMENT | Facility: CLINIC | Age: 61
End: 2021-07-29

## 2021-08-16 ENCOUNTER — TRANSFERRED RECORDS (OUTPATIENT)
Dept: HEALTH INFORMATION MANAGEMENT | Facility: CLINIC | Age: 61
End: 2021-08-16

## 2021-08-30 DIAGNOSIS — G47.00 INSOMNIA, UNSPECIFIED TYPE: ICD-10-CM

## 2021-08-30 NOTE — TELEPHONE ENCOUNTER
Refill request for medication: trazodone 50 mg   Last visit addressing this medication: 6/22  Follow up plan 12  months  Last refill on 6/1/21, quantity #90   CSA completed na  Date PDMP was last reviewed [facility]  Appointment: Not due   Appointment recommended at least every 3 months for opioid prescriptions. Appointment recommended at least every 6 months for ADHD medications.    @[facility]@

## 2021-08-31 RX ORDER — TRAZODONE HYDROCHLORIDE 50 MG/1
50 TABLET, FILM COATED ORAL AT BEDTIME
Qty: 90 TABLET | Refills: 1 | Status: SHIPPED | OUTPATIENT
Start: 2021-08-31 | End: 2022-06-27

## 2021-09-15 DIAGNOSIS — Z91.09 ENVIRONMENTAL ALLERGIES: ICD-10-CM

## 2021-09-15 DIAGNOSIS — K29.50 CHRONIC GASTRITIS WITHOUT BLEEDING, UNSPECIFIED GASTRITIS TYPE: ICD-10-CM

## 2021-09-16 RX ORDER — CETIRIZINE HYDROCHLORIDE 10 MG/1
10 TABLET ORAL DAILY
Qty: 90 TABLET | Refills: 2 | Status: SHIPPED | OUTPATIENT
Start: 2021-09-16 | End: 2022-05-07

## 2021-09-16 NOTE — TELEPHONE ENCOUNTER
"Last Written Prescription Date:  3/2/21  Last Fill Quantity: 90,  # refills: 1   Last office visit provider:  6/1/21     Requested Prescriptions   Pending Prescriptions Disp Refills     omeprazole (PRILOSEC) 20 MG DR capsule [Pharmacy Med Name: OMEPRAZOLE DR CAPS 20MG] 90 capsule 3     Sig: TAKE 1 CAPSULE DAILY BEFORE BREAKFAST       PPI Protocol Passed - 9/15/2021 12:43 PM        Passed - Not on Clopidogrel (unless Pantoprazole ordered)        Passed - No diagnosis of osteoporosis on record        Passed - Recent (12 mo) or future (30 days) visit within the authorizing provider's specialty     Patient has had an office visit with the authorizing provider or a provider within the authorizing providers department within the previous 12 mos or has a future within next 30 days. See \"Patient Info\" tab in inbasket, or \"Choose Columns\" in Meds & Orders section of the refill encounter.              Passed - Medication is active on med list        Passed - Patient is age 18 or older        Passed - No active pregnacy on record        Passed - No positive pregnancy test in past 12 months           cetirizine (ZYRTEC) 10 MG tablet [Pharmacy Med Name: CETIRIZINE TABS-OTC 10MG] 90 tablet 3     Sig: TAKE 1 TABLET DAILY       Antihistamines Protocol Passed - 9/15/2021 12:43 PM        Passed - Patient is 3-64 years of age     Apply weight-based dosing for peds patients age 3 - 12 years of age.    Forward request to provider for patients under the age of 3 or over the age of 64.          Passed - Recent (12 mo) or future (30 days) visit within the authorizing provider's specialty     Patient has had an office visit with the authorizing provider or a provider within the authorizing providers department within the previous 12 mos or has a future within next 30 days. See \"Patient Info\" tab in inbasket, or \"Choose Columns\" in Meds & Orders section of the refill encounter.              Passed - Medication is active on med list       "       Juan Thurston RN 09/16/21 10:26 AM

## 2021-09-22 ENCOUNTER — OFFICE VISIT (OUTPATIENT)
Dept: FAMILY MEDICINE | Facility: CLINIC | Age: 61
End: 2021-09-22
Payer: OTHER GOVERNMENT

## 2021-09-22 VITALS
DIASTOLIC BLOOD PRESSURE: 64 MMHG | RESPIRATION RATE: 14 BRPM | HEART RATE: 90 BPM | BODY MASS INDEX: 26.01 KG/M2 | TEMPERATURE: 98.8 F | SYSTOLIC BLOOD PRESSURE: 95 MMHG | WEIGHT: 148 LBS | OXYGEN SATURATION: 97 %

## 2021-09-22 DIAGNOSIS — N30.01 ACUTE CYSTITIS WITH HEMATURIA: Primary | ICD-10-CM

## 2021-09-22 LAB
ALBUMIN UR-MCNC: NEGATIVE MG/DL
APPEARANCE UR: CLEAR
BACTERIA #/AREA URNS HPF: ABNORMAL /HPF
BILIRUB UR QL STRIP: NEGATIVE
COLOR UR AUTO: YELLOW
GLUCOSE UR STRIP-MCNC: NEGATIVE MG/DL
HGB UR QL STRIP: ABNORMAL
KETONES UR STRIP-MCNC: NEGATIVE MG/DL
LEUKOCYTE ESTERASE UR QL STRIP: ABNORMAL
MUCOUS THREADS #/AREA URNS LPF: PRESENT /LPF
NITRATE UR QL: NEGATIVE
PH UR STRIP: 5.5 [PH] (ref 5–8)
RBC #/AREA URNS AUTO: ABNORMAL /HPF
SP GR UR STRIP: >=1.03 (ref 1–1.03)
SQUAMOUS #/AREA URNS AUTO: ABNORMAL /LPF
UROBILINOGEN UR STRIP-ACNC: 0.2 E.U./DL
WBC #/AREA URNS AUTO: ABNORMAL /HPF

## 2021-09-22 PROCEDURE — 81001 URINALYSIS AUTO W/SCOPE: CPT | Performed by: PHYSICIAN ASSISTANT

## 2021-09-22 PROCEDURE — 99213 OFFICE O/P EST LOW 20 MIN: CPT | Performed by: PHYSICIAN ASSISTANT

## 2021-09-22 PROCEDURE — 87086 URINE CULTURE/COLONY COUNT: CPT | Performed by: PHYSICIAN ASSISTANT

## 2021-09-22 RX ORDER — NITROFURANTOIN 25; 75 MG/1; MG/1
100 CAPSULE ORAL 2 TIMES DAILY
Qty: 10 CAPSULE | Refills: 0 | Status: SHIPPED | OUTPATIENT
Start: 2021-09-22 | End: 2021-09-27

## 2021-09-22 NOTE — PROGRESS NOTES
Assessment & Plan:      Problem List Items Addressed This Visit     None      Visit Diagnoses     Acute cystitis with hematuria    -  Primary    Relevant Medications    nitroFURantoin macrocrystal-monohydrate (MACROBID) 100 MG capsule    Other Relevant Orders    UA macro with reflex to Microscopic and Culture - Clinc Collect (Completed)    Urine Microscopic (Completed)    Urine Culture        Medical Decision Making  Patient presents with acute onset dysuria.  Urinalysis does show signs consistent with a urinary tract infection.  Low concern for pyelonephritis as patient has no fevers and no CVA tenderness.  Will start patient on oral antibiotics.  Continue to drink plenty of clear fluids.  Discussed signs of worsening symptoms and when to follow-up with PCP if no symptom improvement.     Subjective:      Trenton Milian is a 61 year old female here for evaluation of dysuria and increased urinary frequency.  Onset of symptoms was 2 to 3 days ago.  Associated symptoms include suprapubic pressure and some low back discomfort.  Patient otherwise denies fevers, nausea, vomiting.  She has had urinary tract infections in the past that have felt like this.  No recent UTIs.     The following portions of the patient's history were reviewed and updated as appropriate: allergies, current medications, and problem list.     Review of Systems  Pertinent items are noted in HPI.    Allergies  Allergies   Allergen Reactions     Augmentin Unknown     itchy     Clindamycin Unknown     Fentanyl Unknown     itchy     Penicillins Unknown     itchy     Septra [Sulfamethoxazole W/Trimethoprim] Unknown     itchy     Tramadol Unknown     itchy     Zyban [Bupropion] Unknown     Personality Change       Family History   Problem Relation Age of Onset     Hypertension Mother      Thyroid Disease Mother      Arthritis Mother      Breast Cancer Mother 85.00        breast cancer     Hypertension Father      Heart Disease Father         Valve  replacement ,bypass     Arthritis Father      Gout Son      Breast Cancer Maternal Grandmother      Other - See Comments Maternal Grandmother         some sort blood clot.     Breast Cancer Paternal Aunt      Anxiety Disorder Brother      Obesity Brother      Colon Cancer Brother         Rectal Cancer.     Gout Son        Social History     Tobacco Use     Smoking status: Current Every Day Smoker     Packs/day: 0.50     Years: 40.00     Pack years: 20.00     Types: Cigarettes     Smokeless tobacco: Never Used   Substance Use Topics     Alcohol use: Never        Objective:      BP 95/64   Pulse 90   Temp 98.8  F (37.1  C) (Oral)   Resp 14   Wt 67.1 kg (148 lb)   SpO2 97%   BMI 26.01 kg/m    General appearance - alert, well appearing, and in no distress and non-toxic  Back exam - No CVA tenderness     Lab & Imaging Results    Results for orders placed or performed in visit on 09/22/21 (from the past 24 hour(s))   UA macro with reflex to Microscopic and Culture - Clinc Collect    Specimen: Urine, NOS   Result Value Ref Range    Color Urine Yellow Colorless, Straw, Light Yellow, Yellow    Appearance Urine Clear Clear    Glucose Urine Negative Negative mg/dL    Bilirubin Urine Negative Negative    Ketones Urine Negative Negative mg/dL    Specific Gravity Urine >=1.030 1.005 - 1.030    Blood Urine Trace (A) Negative    pH Urine 5.5 5.0 - 8.0    Protein Albumin Urine Negative Negative mg/dL    Urobilinogen Urine 0.2 0.2, 1.0 E.U./dL    Nitrite Urine Negative Negative    Leukocyte Esterase Urine Trace (A) Negative   Urine Microscopic   Result Value Ref Range    Bacteria Urine Few (A) None Seen /HPF    RBC Urine 2-5 (A) 0-2 /HPF /HPF    WBC Urine  (A) 0-5 /HPF /HPF    Squamous Epithelials Urine Few (A) None Seen /LPF    Mucus Urine Present (A) None Seen /LPF       I personally reviewed these results and discussed findings with the patient.

## 2021-09-23 LAB — BACTERIA UR CULT: NO GROWTH

## 2021-10-11 ENCOUNTER — HEALTH MAINTENANCE LETTER (OUTPATIENT)
Age: 61
End: 2021-10-11

## 2021-10-20 ENCOUNTER — OFFICE VISIT (OUTPATIENT)
Dept: FAMILY MEDICINE | Facility: CLINIC | Age: 61
End: 2021-10-20
Payer: OTHER GOVERNMENT

## 2021-10-20 VITALS
BODY MASS INDEX: 25.59 KG/M2 | OXYGEN SATURATION: 95 % | SYSTOLIC BLOOD PRESSURE: 104 MMHG | HEIGHT: 63 IN | DIASTOLIC BLOOD PRESSURE: 63 MMHG | HEART RATE: 79 BPM | WEIGHT: 144.4 LBS

## 2021-10-20 DIAGNOSIS — K29.50 CHRONIC GASTRITIS WITHOUT BLEEDING, UNSPECIFIED GASTRITIS TYPE: ICD-10-CM

## 2021-10-20 DIAGNOSIS — L29.9 ITCHING: ICD-10-CM

## 2021-10-20 DIAGNOSIS — K59.01 SLOW TRANSIT CONSTIPATION: ICD-10-CM

## 2021-10-20 DIAGNOSIS — Z78.9 PATIENT TRAVELS: ICD-10-CM

## 2021-10-20 DIAGNOSIS — R41.840 CONCENTRATION DEFICIT: ICD-10-CM

## 2021-10-20 DIAGNOSIS — Z13.220 SCREENING FOR LIPID DISORDERS: ICD-10-CM

## 2021-10-20 DIAGNOSIS — R07.9 CHEST PAIN, UNSPECIFIED TYPE: ICD-10-CM

## 2021-10-20 DIAGNOSIS — Z00.00 ROUTINE GENERAL MEDICAL EXAMINATION AT A HEALTH CARE FACILITY: Primary | ICD-10-CM

## 2021-10-20 DIAGNOSIS — G25.81 RESTLESS LEG SYNDROME: ICD-10-CM

## 2021-10-20 LAB
ALBUMIN SERPL-MCNC: 3.9 G/DL (ref 3.5–5)
ALP SERPL-CCNC: 133 U/L (ref 45–120)
ALT SERPL W P-5'-P-CCNC: 25 U/L (ref 0–45)
ANION GAP SERPL CALCULATED.3IONS-SCNC: 11 MMOL/L (ref 5–18)
AST SERPL W P-5'-P-CCNC: 23 U/L (ref 0–40)
BASOPHILS # BLD AUTO: 0.1 10E3/UL (ref 0–0.2)
BASOPHILS NFR BLD AUTO: 1 %
BILIRUB SERPL-MCNC: 0.4 MG/DL (ref 0–1)
BUN SERPL-MCNC: 14 MG/DL (ref 8–22)
CALCIUM SERPL-MCNC: 9.3 MG/DL (ref 8.5–10.5)
CHLORIDE BLD-SCNC: 108 MMOL/L (ref 98–107)
CHOLEST SERPL-MCNC: 240 MG/DL
CO2 SERPL-SCNC: 23 MMOL/L (ref 22–31)
CREAT SERPL-MCNC: 0.82 MG/DL (ref 0.6–1.1)
EOSINOPHIL # BLD AUTO: 0.3 10E3/UL (ref 0–0.7)
EOSINOPHIL NFR BLD AUTO: 3 %
ERYTHROCYTE [DISTWIDTH] IN BLOOD BY AUTOMATED COUNT: 13.5 % (ref 10–15)
FASTING STATUS PATIENT QL REPORTED: YES
GFR SERPL CREATININE-BSD FRML MDRD: 76 ML/MIN/1.73M2
GLUCOSE BLD-MCNC: 97 MG/DL (ref 70–125)
HCT VFR BLD AUTO: 45.5 % (ref 35–47)
HDLC SERPL-MCNC: 46 MG/DL
HGB BLD-MCNC: 15.2 G/DL (ref 11.7–15.7)
IMM GRANULOCYTES # BLD: 0 10E3/UL
IMM GRANULOCYTES NFR BLD: 0 %
LDLC SERPL CALC-MCNC: 158 MG/DL
LYMPHOCYTES # BLD AUTO: 2.1 10E3/UL (ref 0.8–5.3)
LYMPHOCYTES NFR BLD AUTO: 21 %
MCH RBC QN AUTO: 33.7 PG (ref 26.5–33)
MCHC RBC AUTO-ENTMCNC: 33.4 G/DL (ref 31.5–36.5)
MCV RBC AUTO: 101 FL (ref 78–100)
MONOCYTES # BLD AUTO: 0.8 10E3/UL (ref 0–1.3)
MONOCYTES NFR BLD AUTO: 8 %
NEUTROPHILS # BLD AUTO: 6.8 10E3/UL (ref 1.6–8.3)
NEUTROPHILS NFR BLD AUTO: 68 %
PLATELET # BLD AUTO: 246 10E3/UL (ref 150–450)
POTASSIUM BLD-SCNC: 4.9 MMOL/L (ref 3.5–5)
PROT SERPL-MCNC: 6.9 G/DL (ref 6–8)
RBC # BLD AUTO: 4.51 10E6/UL (ref 3.8–5.2)
SODIUM SERPL-SCNC: 142 MMOL/L (ref 136–145)
TRIGL SERPL-MCNC: 179 MG/DL
WBC # BLD AUTO: 10 10E3/UL (ref 4–11)

## 2021-10-20 PROCEDURE — 99396 PREV VISIT EST AGE 40-64: CPT | Performed by: FAMILY MEDICINE

## 2021-10-20 PROCEDURE — 96127 BRIEF EMOTIONAL/BEHAV ASSMT: CPT | Performed by: FAMILY MEDICINE

## 2021-10-20 PROCEDURE — U0003 INFECTIOUS AGENT DETECTION BY NUCLEIC ACID (DNA OR RNA); SEVERE ACUTE RESPIRATORY SYNDROME CORONAVIRUS 2 (SARS-COV-2) (CORONAVIRUS DISEASE [COVID-19]), AMPLIFIED PROBE TECHNIQUE, MAKING USE OF HIGH THROUGHPUT TECHNOLOGIES AS DESCRIBED BY CMS-2020-01-R: HCPCS | Performed by: FAMILY MEDICINE

## 2021-10-20 PROCEDURE — 80053 COMPREHEN METABOLIC PANEL: CPT | Performed by: FAMILY MEDICINE

## 2021-10-20 PROCEDURE — 99213 OFFICE O/P EST LOW 20 MIN: CPT | Mod: 25 | Performed by: FAMILY MEDICINE

## 2021-10-20 PROCEDURE — 85025 COMPLETE CBC W/AUTO DIFF WBC: CPT | Performed by: FAMILY MEDICINE

## 2021-10-20 PROCEDURE — 80061 LIPID PANEL: CPT | Performed by: FAMILY MEDICINE

## 2021-10-20 PROCEDURE — U0005 INFEC AGEN DETEC AMPLI PROBE: HCPCS | Performed by: FAMILY MEDICINE

## 2021-10-20 PROCEDURE — 36415 COLL VENOUS BLD VENIPUNCTURE: CPT | Performed by: FAMILY MEDICINE

## 2021-10-20 RX ORDER — LINACLOTIDE 72 UG/1
72 CAPSULE, GELATIN COATED ORAL
Qty: 90 CAPSULE | Refills: 2 | Status: SHIPPED | OUTPATIENT
Start: 2021-10-20 | End: 2022-06-27

## 2021-10-20 RX ORDER — TRIAMCINOLONE ACETONIDE 1 MG/G
CREAM TOPICAL 2 TIMES DAILY
Qty: 80 G | Refills: 1 | Status: SHIPPED | OUTPATIENT
Start: 2021-10-20

## 2021-10-20 RX ORDER — GABAPENTIN 300 MG/1
300-600 CAPSULE ORAL DAILY PRN
Qty: 180 CAPSULE | Refills: 3 | Status: SHIPPED | OUTPATIENT
Start: 2021-10-20 | End: 2022-06-27

## 2021-10-20 ASSESSMENT — MIFFLIN-ST. JEOR: SCORE: 1179.12

## 2021-10-20 NOTE — PROGRESS NOTES
SUBJECTIVE:   CC: Trenton Milian is an 61 year old woman who presents for preventive health visit.       Patient has been advised of split billing requirements and indicates understanding: Yes  Healthy Habits:     Getting at least 3 servings of Calcium per day:  Yes    Bi-annual eye exam:  Yes    Dental care twice a year:  Yes    Sleep apnea or symptoms of sleep apnea:  None    Diet:  Regular (no restrictions)    Frequency of exercise:  4-5 days/week    Duration of exercise:  45-60 minutes    Taking medications regularly:  Yes    Barriers to taking medications:  None    Medication side effects:  Other    PHQ-2 Total Score: 1    Additional concerns today:  No  Comes in for a physical today.  Needing medication refill as well.  Has a history of constipation and uses Linzess for it.  Needs a gabapentin refill for restless leg syndrome.  She is having some itching noted on the creases of the antecubital fossa and needs a refill of steroid creams.  She is also complaining of having chest pain noted in the middle of the chest that intermittently radiates to the back.  Able to control her breathing as such no dyspnea.  Associated with moderate amount of physical activity.  She does take omeprazole for gastritis.  She is also having some difficulty with concentration and focus.  Noted that in the past she has taken Strattera and that was not very effective in controlling her concentration and focus.  But is a difficult controlling or completing tasks at home.  She did have an EGD in 2019 noted some polyps in the stomach was supposed to follow-up with the gastroenterologist but she did not.  Going to put in a referral for her to do that at this time.      Today's PHQ-2 Score:   PHQ-2 ( 1999 Pfizer) 10/20/2021   Q1: Little interest or pleasure in doing things 0   Q2: Feeling down, depressed or hopeless 1   PHQ-2 Score 1   Q1: Little interest or pleasure in doing things Not at all   Q2: Feeling down, depressed or hopeless  Several days   PHQ-2 Score 1       Abuse: Current or Past (Physical, Sexual or Emotional) - No  Do you feel safe in your environment? Yes    Have you ever done Advance Care Planning? (For example, a Health Directive, POLST, or a discussion with a medical provider or your loved ones about your wishes): No, advance care planning information given to patient to review.  Patient plans to discuss their wishes with loved ones or provider.      Social History     Tobacco Use     Smoking status: Current Every Day Smoker     Packs/day: 0.50     Years: 40.00     Pack years: 20.00     Types: Cigarettes     Smokeless tobacco: Never Used   Substance Use Topics     Alcohol use: Never     If you drink alcohol do you typically have >3 drinks per day or >7 drinks per week? No    Alcohol Use 10/20/2021   Prescreen: >3 drinks/day or >7 drinks/week? No   Prescreen: >3 drinks/day or >7 drinks/week? -   No flowsheet data found.    Reviewed orders with patient.  Reviewed health maintenance and updated orders accordingly - Yes      Breast Cancer Screening:  Any new diagnosis of family breast, ovarian, or bowel cancer? Yes       FHS-7: No flowsheet data found.  Pertinent mammograms are reviewed under the imaging tab.    Reviewed and updated as needed this visit by clinical staff  Tobacco  Allergies  Meds              Reviewed and updated as needed this visit by Provider                    Review of Systems  CONSTITUTIONAL: NEGATIVE for fever, chills, change in weight  INTEGUMENTARY/SKIN: NEGATIVE for worrisome rashes, moles or lesions  EYES: NEGATIVE for vision changes or irritation  ENT: NEGATIVE for ear, mouth and throat problems  RESP: NEGATIVE for significant cough or SOB  BREAST: NEGATIVE for masses, tenderness or discharge  CV: NEGATIVE for chest pain, palpitations or peripheral edema  GI: NEGATIVE for nausea, abdominal pain, heartburn, or change in bowel habits  : NEGATIVE for unusual urinary or vaginal symptoms. No vaginal  "bleeding.  MUSCULOSKELETAL: NEGATIVE for significant arthralgias or myalgia  NEURO: NEGATIVE for weakness, dizziness or paresthesias  PSYCHIATRIC: NEGATIVE for changes in mood or affect      OBJECTIVE:   /63 (BP Location: Left arm, Patient Position: Sitting, Cuff Size: Adult Regular)   Pulse 79   Ht 1.6 m (5' 3\")   Wt 65.5 kg (144 lb 6.4 oz)   SpO2 95%   BMI 25.58 kg/m    Physical Exam  GENERAL APPEARANCE: healthy, alert and no distress  EYES: Eyes grossly normal to inspection, PERRL and conjunctivae and sclerae normal  HENT: ear canals and TM's normal, nose and mouth without ulcers or lesions, oropharynx clear and oral mucous membranes moist  NECK: no adenopathy, no asymmetry, masses, or scars and thyroid normal to palpation  RESP: lungs clear to auscultation - no rales, rhonchi or wheezes  BREAST: normal without masses, tenderness or nipple discharge and no palpable axillary masses or adenopathy  CV: regular rate and rhythm, normal S1 S2, no S3 or S4, no murmur, click or rub, no peripheral edema and peripheral pulses strong  ABDOMEN: soft, nontender, no hepatosplenomegaly, no masses and bowel sounds normal  MS: no musculoskeletal defects are noted and gait is age appropriate without ataxia  SKIN: no suspicious lesions or rashes  NEURO: Normal strength and tone, sensory exam grossly normal, mentation intact and speech normal  PSYCH: mentation appears normal and affect normal/bright        ASSESSMENT/PLAN:   Trenton was seen today for derm problem and covid test.    Diagnoses and all orders for this visit:    Routine general medical examination at a health care facility  -     Comprehensive metabolic panel (BMP + Alb, Alk Phos, ALT, AST, Total. Bili, TP); Future  -     CBC with platelets and differential; Future  -     Comprehensive metabolic panel (BMP + Alb, Alk Phos, ALT, AST, Total. Bili, TP)  -     CBC with platelets and differential    Screening for lipid disorders  -     Lipid panel reflex to " "direct LDL Fasting; Future  -     Lipid panel reflex to direct LDL Fasting    Restless leg syndrome  -     gabapentin (NEURONTIN) 300 MG capsule; Take 1-2 capsules (300-600 mg) by mouth daily as needed for neuropathic pain    Itching  -     triamcinolone (KENALOG) 0.1 % external cream; Apply topically 2 times daily    Slow transit constipation  -     LINZESS 72 MCG capsule; Take 1 capsule (72 mcg) by mouth every morning (before breakfast)    Patient travels  -     Asymptomatic COVID-19 Virus (Coronavirus) by PCR Nasopharyngeal; Standing  -     Asymptomatic COVID-19 Virus (Coronavirus) by PCR Nose    Chest pain, unspecified type  -     Adult Cardiology Eval Referral; Future    Chronic gastritis without bleeding, unspecified gastritis type  -     Adult Gastro Ref - Consult Only; Future    Concentration deficit        Patient has been advised of split billing requirements and indicates understanding: Yes  COUNSELING:  Reviewed preventive health counseling, as reflected in patient instructions       Regular exercise       Healthy diet/nutrition       Regarding the chest pain, she does not classic symptoms of ACS.  She is still able to exercise and the pain happens intermittently.  But I think it is necessary to have her referred to see the cardiologist which I did.  Also put in a referral for her for the gastroenterologist.  I did do a clinic screening for ADHD for the patient in she discovered a high.  Will consider possible medication use for her but will do that at that she had come back from seeing the cardiologist.  Labs were ordered and will be communicated to her.    Estimated body mass index is 25.58 kg/m  as calculated from the following:    Height as of this encounter: 1.6 m (5' 3\").    Weight as of this encounter: 65.5 kg (144 lb 6.4 oz).        She reports that she has been smoking cigarettes. She has a 20.00 pack-year smoking history. She has never used smokeless tobacco.  Tobacco Cessation Action Plan: "   Information offered: Patient not interested at this time      Counseling Resources:  ATP IV Guidelines  Pooled Cohorts Equation Calculator  Breast Cancer Risk Calculator  BRCA-Related Cancer Risk Assessment: FHS-7 Tool  FRAX Risk Assessment  ICSI Preventive Guidelines  Dietary Guidelines for Americans, 2010  USDA's MyPlate  ASA Prophylaxis  Lung CA Screening    Chacorta Layton MD  [facility]

## 2021-10-21 ENCOUNTER — TRANSFERRED RECORDS (OUTPATIENT)
Dept: HEALTH INFORMATION MANAGEMENT | Facility: CLINIC | Age: 61
End: 2021-10-21
Payer: OTHER GOVERNMENT

## 2021-10-21 LAB — SARS-COV-2 RNA RESP QL NAA+PROBE: NEGATIVE

## 2021-11-08 ENCOUNTER — OFFICE VISIT (OUTPATIENT)
Dept: CARDIOLOGY | Facility: CLINIC | Age: 61
End: 2021-11-08
Payer: OTHER GOVERNMENT

## 2021-11-08 VITALS
SYSTOLIC BLOOD PRESSURE: 108 MMHG | DIASTOLIC BLOOD PRESSURE: 60 MMHG | WEIGHT: 142 LBS | BODY MASS INDEX: 25.15 KG/M2 | HEART RATE: 68 BPM | RESPIRATION RATE: 16 BRPM

## 2021-11-08 DIAGNOSIS — R06.09 DOE (DYSPNEA ON EXERTION): ICD-10-CM

## 2021-11-08 DIAGNOSIS — R07.9 CHEST PAIN, UNSPECIFIED TYPE: ICD-10-CM

## 2021-11-08 DIAGNOSIS — R07.2 PRECORDIAL PAIN: Primary | ICD-10-CM

## 2021-11-08 PROCEDURE — 99204 OFFICE O/P NEW MOD 45 MIN: CPT | Performed by: INTERNAL MEDICINE

## 2021-11-08 NOTE — LETTER
11/8/2021    Chacorta Layton MD  [address] [facility] [address]    RE: Trenton Montañosuzie       Dear Colleague,    I had the pleasure of seeing Trenton Milian in the [facility].         [facility]   [address], [place] [address]   [email or web address]   OFFICE: [phone]          Thank you Dr. Layton for asking the [facility] team to participate in the care of your patient, Trenton Milian.     Impression and Plan     1.  Chest discomfort.  Certain features of Vanessa's chest discomfort are a bit atypical for cardiac etiology, ischemic or otherwise.  It is not necessarily provoked with exertion.  Nonetheless, she does have some risk factors for coronary disease including smoking history, early onset coronary disease in her father who had concomitant valve replacement with bypass at age 65-70, as well as some history of elevated lipids.  Cannot rule out possible ischemic contribution to some of her symptoms.  Plan:    Stress echocardiogram.        2.  Tobacco abuse.  This was reviewed with aVnessa.  Importance of smoking cessation was discussed with patient.  She states presently that she is somewhat reluctant to fully quit smoking out of concern of weight gain.  Nonetheless, we did discuss how this can influence development of atherosclerosis.    Further recommendations and follow-up pending stress echocardiographic findings.    30 minutes spent reviewing prior records (including documentation, laboratory studies, cardiac testing/imaging), interview with patient along with physical exam, planning, and subsequent documentation/crafting of note.       History of Present Illness    Once again I would like to thank you again for asking me to participate in the care of your patient, Trenton Milian.  As you know, but to reiterate for my own records, Trenton Milian is a 61 year old female who  "has been having a variety of different symptoms, but also chest discomfort.  Patient states that she has suffered from some relatively chronic type fatigue symptoms.  She also reports some the sensation that her legs feel like \"wood\" at times though despite this can actually exercise fairly well on a regular basis.  She was found to have low iron and is having iron supplementation not only enterally, but IV.    She does report a chest pain sensation which is in the central sternal region and radiates toward the back.  This is rather sporadic, however.  It is not necessarily provoked with exertion in a consistent fashion.  She reports some rare palpitations, but no era lightheadedness and the like.  She denies any fevers, chills, or other constitutional symptoms.    Further review of systems is otherwise negative/noncontributory (medical record and 13 point review of systems reviewed as well and pertinent positives noted).       Physical Examination       /60 (BP Location: Left arm, Patient Position: Sitting, Cuff Size: Adult Regular)   Pulse 68   Resp 16   Wt 64.4 kg (142 lb)   BMI 25.15 kg/m          Wt Readings from Last 3 Encounters:   11/08/21 64.4 kg (142 lb)   10/20/21 65.5 kg (144 lb 6.4 oz)   09/22/21 67.1 kg (148 lb)     The patient is alert and oriented times three. Sclerae are anicteric. Mucosal membranes are moist. Jugular venous pressure is normal. No significant adenopathy/thyromegally appreciated. Lungs are clear with good expansion. On cardiovascular exam, the patient has a regular S1 and S2. Abdomen is soft and non-tender. Extremities reveal no clubbing, cyanosis, or edema.       Imaging     Lower extremity Dopplers 22 June 2021:  1. No deep venous thrombosis in the left lower extremity.        Family History/Social History/Risk Factors   Patient does smoke.  Family history reviewed, and family history includes Anxiety Disorder in her brother; Arthritis in her father and mother; Breast " Cancer in her maternal grandmother and paternal aunt; Breast Cancer (age of onset: 85.00) in her mother; Colon Cancer in her brother; Gout in her son and son; Heart Disease in her father; Hypertension in her father and mother; Obesity in her brother; Other - See Comments in her maternal grandmother; Thyroid Disease in her mother.        Medical History  Surgical History Family History Social History   Past Medical History:   Diagnosis Date     Asthma      Depression      Environmental allergies      Esophageal stricture 09/15/2014    EGD done at [facility].     Gastric ulcer 09/15/2014    EGD done at [facility].     GERD (gastroesophageal reflux disease)      Left leg DVT (H)      Lupus (H)      Past Surgical History:   Procedure Laterality Date     ABDOMINOPLASTY       APPENDECTOMY        SECTION       ESOPHAGOSCOPY, GASTROSCOPY, DUODENOSCOPY (EGD), COMBINED  09/15/2014    Esophageal stricture and gastric ulceration.     EXCISION / CURETTAGE BONE CYST PHALANGES OF FOOT Left      HYSTERECTOMY TOTAL ABDOMINAL, BILATERAL SALPINGO-OOPHORECTOMY, COMBINED      for ovarian infection.     MANDIBLE SURGERY      lower jaw advancement. Left over numbness of tongue and gums.Totally 5 surgeries.     OVARIAN CYST SURGERY Right 1977     SINUS SURGERY Bilateral      Family History   Problem Relation Age of Onset     Hypertension Mother      Thyroid Disease Mother      Arthritis Mother      Breast Cancer Mother 85.00        breast cancer     Hypertension Father      Heart Disease Father         Valve replacement ,bypass     Arthritis Father      Gout Son      Breast Cancer Maternal Grandmother      Other - See Comments Maternal Grandmother         some sort blood clot.     Breast Cancer Paternal Aunt      Anxiety Disorder Brother      Obesity Brother      Colon Cancer Brother         Rectal Cancer.     Gout Son         Social History     Socioeconomic History     Marital  status:      Spouse name: Not on file     Number of children: Not on file     Years of education: Not on file     Highest education level: Not on file   Occupational History     Not on file   Tobacco Use     Smoking status: Current Every Day Smoker     Packs/day: 0.50     Years: 40.00     Pack years: 20.00     Types: Cigarettes     Smokeless tobacco: Never Used   Substance and Sexual Activity     Alcohol use: Never     Drug use: Never     Sexual activity: Yes     Partners: Male   Other Topics Concern     Not on file   Social History Narrative     Not on file     Social Determinants of Health     Financial Resource Strain: Not on file   Food Insecurity: Not on file   Transportation Needs: Not on file   Physical Activity: Not on file   Stress: Not on file   Social Connections: Not on file   Intimate Partner Violence: Not on file   Housing Stability: Not on file           Medications  Allergies   Current Outpatient Medications   Medication Sig Dispense Refill     calcium carbonate-vitamin D3 (CALTRATE 600 PLUS D3) 600 mg(1,500mg) -400 unit per tablet [CALCIUM CARBONATE-VITAMIN D3 (CALTRATE 600 PLUS D3) 600 MG(1,500MG) -400 UNIT PER TABLET]        CERAVE Crea cream [CERAVE CREA CREAM]        cetirizine (ZYRTEC) 10 MG tablet Take 1 tablet (10 mg) by mouth daily 90 tablet 2     diazePAM (VALIUM) 10 MG tablet [DIAZEPAM (VALIUM) 10 MG TABLET] TAKE 1/2 TO 1 TABLET(5 TO 10 MG) BY MOUTH AT BEDTIME AS NEEDED 30 tablet 5     gabapentin (NEURONTIN) 300 MG capsule Take 1-2 capsules (300-600 mg) by mouth daily as needed for neuropathic pain 180 capsule 3     LINZESS 72 MCG capsule Take 1 capsule (72 mcg) by mouth every morning (before breakfast) 90 capsule 2     MUCINEX 600 mg 12 hr tablet [MUCINEX 600 MG 12 HR TABLET]        omeprazole (PRILOSEC) 20 MG DR capsule TAKE 1 CAPSULE DAILY BEFORE BREAKFAST 90 capsule 2     PROAIR HFA 90 mcg/actuation inhaler [PROAIR HFA 90 MCG/ACTUATION INHALER]        sertraline (ZOLOFT) 25 MG  tablet [SERTRALINE (ZOLOFT) 25 MG TABLET] Take 1 tablet (25 mg total) by mouth daily. 90 tablet 3     traZODone (DESYREL) 50 MG tablet Take 1 tablet (50 mg) by mouth At Bedtime 90 tablet 1     triamcinolone (KENALOG) 0.1 % external cream Apply topically 2 times daily 80 g 1     VOLTAREN 1 % Gel [VOLTAREN 1 % GEL] Apply 2 g topically 2 (two) times a day as needed (to Feet). 150 g 2     XARELTO 20 mg tablet [XARELTO 20 MG TABLET] Take 1 tablet (20 mg total) by mouth daily. 90 tablet 2       Allergies   Allergen Reactions     Augmentin Unknown     itchy     Clindamycin Unknown     Fentanyl Unknown     itchy     Hydroxyzine      Penicillins Unknown     itchy     Septra [Sulfamethoxazole W/Trimethoprim] Unknown     itchy     Tramadol Unknown     itchy     Zyban [Bupropion] Unknown     Personality Change          Lab Results    Chemistry/lipid CBC Cardiac Enzymes/BNP/TSH/INR   Recent Labs   Lab Test 10/20/21  1007   CHOL 240*   HDL 46*   *   TRIG 179*     Recent Labs   Lab Test 10/20/21  1007 09/15/20  0919   * 207*     Recent Labs   Lab Test 10/20/21  1007      POTASSIUM 4.9   CHLORIDE 108*   CO2 23   GLC 97   BUN 14   CR 0.82   GFRESTIMATED 76   TRICIA 9.3     Recent Labs   Lab Test 10/20/21  1007 09/15/20  0919 12/17/19  1429   CR 0.82 0.90 0.9     No results for input(s): A1C in the last 49147 hours.       Recent Labs   Lab Test 10/20/21  1007   WBC 10.0   HGB 15.2   HCT 45.5   *        Recent Labs   Lab Test 10/20/21  1007 06/01/21  1230 09/15/20  0919   HGB 15.2 14.5 16.3*    No results for input(s): TROPONINI in the last 23011 hours.  No results for input(s): BNP, NTBNPI, NTBNP in the last 88386 hours.  No results for input(s): TSH in the last 91148 hours.  No results for input(s): INR in the last 60431 hours.       Medications  Allergies   Current Outpatient Medications   Medication Sig Dispense Refill     calcium carbonate-vitamin D3 (CALTRATE 600 PLUS D3) 600 mg(1,500mg) -400 unit  per tablet [CALCIUM CARBONATE-VITAMIN D3 (CALTRATE 600 PLUS D3) 600 MG(1,500MG) -400 UNIT PER TABLET]        CERAVE Crea cream [CERAVE CREA CREAM]        cetirizine (ZYRTEC) 10 MG tablet Take 1 tablet (10 mg) by mouth daily 90 tablet 2     diazePAM (VALIUM) 10 MG tablet [DIAZEPAM (VALIUM) 10 MG TABLET] TAKE 1/2 TO 1 TABLET(5 TO 10 MG) BY MOUTH AT BEDTIME AS NEEDED 30 tablet 5     gabapentin (NEURONTIN) 300 MG capsule Take 1-2 capsules (300-600 mg) by mouth daily as needed for neuropathic pain 180 capsule 3     LINZESS 72 MCG capsule Take 1 capsule (72 mcg) by mouth every morning (before breakfast) 90 capsule 2     MUCINEX 600 mg 12 hr tablet [MUCINEX 600 MG 12 HR TABLET]        omeprazole (PRILOSEC) 20 MG DR capsule TAKE 1 CAPSULE DAILY BEFORE BREAKFAST 90 capsule 2     PROAIR HFA 90 mcg/actuation inhaler [PROAIR HFA 90 MCG/ACTUATION INHALER]        sertraline (ZOLOFT) 25 MG tablet [SERTRALINE (ZOLOFT) 25 MG TABLET] Take 1 tablet (25 mg total) by mouth daily. 90 tablet 3     traZODone (DESYREL) 50 MG tablet Take 1 tablet (50 mg) by mouth At Bedtime 90 tablet 1     triamcinolone (KENALOG) 0.1 % external cream Apply topically 2 times daily 80 g 1     VOLTAREN 1 % Gel [VOLTAREN 1 % GEL] Apply 2 g topically 2 (two) times a day as needed (to Feet). 150 g 2     XARELTO 20 mg tablet [XARELTO 20 MG TABLET] Take 1 tablet (20 mg total) by mouth daily. 90 tablet 2      Allergies   Allergen Reactions     Augmentin Unknown     itchy     Clindamycin Unknown     Fentanyl Unknown     itchy     Hydroxyzine      Penicillins Unknown     itchy     Septra [Sulfamethoxazole W/Trimethoprim] Unknown     itchy     Tramadol Unknown     itchy     Zyban [Bupropion] Unknown     Personality Change        Lab Results   Lab Results   Component Value Date     10/20/2021    CO2 23 10/20/2021    BUN 14 10/20/2021     Lab Results   Component Value Date    WBC 10.0 10/20/2021    HGB 15.2 10/20/2021    HCT 45.5 10/20/2021     10/20/2021      10/20/2021     Lab Results   Component Value Date    CHOL 240 10/20/2021    TRIG 179 10/20/2021    HDL 46 10/20/2021         Medical History  Surgical History   Past Medical History:   Diagnosis Date     Asthma      Depression      Environmental allergies      Esophageal stricture 09/15/2014    EGD done at [facility].     Gastric ulcer 09/15/2014    EGD done at [facility].     GERD (gastroesophageal reflux disease)      Left leg DVT (H)      Lupus (H)       Past Surgical History:   Procedure Laterality Date     ABDOMINOPLASTY       APPENDECTOMY        SECTION       ESOPHAGOSCOPY, GASTROSCOPY, DUODENOSCOPY (EGD), COMBINED  09/15/2014    Esophageal stricture and gastric ulceration.     EXCISION / CURETTAGE BONE CYST PHALANGES OF FOOT Left      HYSTERECTOMY TOTAL ABDOMINAL, BILATERAL SALPINGO-OOPHORECTOMY, COMBINED      for ovarian infection.     MANDIBLE SURGERY  2007    lower jaw advancement. Left over numbness of tongue and gums.Totally 5 surgeries.     OVARIAN CYST SURGERY Right      SINUS SURGERY Bilateral                cc:   Chacorta Layton MD  [address] [place] [address]

## 2021-11-19 ENCOUNTER — HOSPITAL ENCOUNTER (OUTPATIENT)
Dept: CARDIOLOGY | Facility: CLINIC | Age: 61
Discharge: HOME | End: 2021-11-19
Attending: INTERNAL MEDICINE | Admitting: INTERNAL MEDICINE
Payer: OTHER GOVERNMENT

## 2021-11-19 DIAGNOSIS — R07.9 CHEST PAIN, UNSPECIFIED TYPE: ICD-10-CM

## 2021-11-19 DIAGNOSIS — R07.2 PRECORDIAL PAIN: ICD-10-CM

## 2021-11-19 PROCEDURE — 999N000208 ECHO STRESS ECHOCARDIOGRAM

## 2021-11-19 PROCEDURE — 93325 DOPPLER ECHO COLOR FLOW MAPG: CPT | Mod: 26 | Performed by: INTERNAL MEDICINE

## 2021-11-19 PROCEDURE — 93016 CV STRESS TEST SUPVJ ONLY: CPT | Performed by: INTERNAL MEDICINE

## 2021-11-19 PROCEDURE — 93321 DOPPLER ECHO F-UP/LMTD STD: CPT | Mod: 26 | Performed by: INTERNAL MEDICINE

## 2021-11-19 PROCEDURE — 255N000002 HC RX 255 OP 636: Performed by: INTERNAL MEDICINE

## 2021-11-19 PROCEDURE — 93018 CV STRESS TEST I&R ONLY: CPT | Performed by: INTERNAL MEDICINE

## 2021-11-19 PROCEDURE — 93350 STRESS TTE ONLY: CPT | Mod: 26 | Performed by: INTERNAL MEDICINE

## 2021-11-19 RX ADMIN — PERFLUTREN 6 ML: 6.52 INJECTION, SUSPENSION INTRAVENOUS at 08:30

## 2021-11-20 ENCOUNTER — IMMUNIZATION (OUTPATIENT)
Dept: FAMILY MEDICINE | Facility: CLINIC | Age: 61
End: 2021-11-20
Payer: OTHER GOVERNMENT

## 2021-11-20 PROCEDURE — 90471 IMMUNIZATION ADMIN: CPT

## 2021-11-20 PROCEDURE — 90682 RIV4 VACC RECOMBINANT DNA IM: CPT

## 2021-11-21 DIAGNOSIS — D68.62 LUPUS ANTICOAGULANT DISORDER (H): ICD-10-CM

## 2021-11-23 RX ORDER — RIVAROXABAN 20 MG/1
TABLET, FILM COATED ORAL
Qty: 90 TABLET | Refills: 3 | Status: SHIPPED | OUTPATIENT
Start: 2021-11-23 | End: 2023-01-23

## 2021-11-23 NOTE — TELEPHONE ENCOUNTER
Routing refill request to provider for review/approval because:  Labs not current:  CC    Last Written Prescription Date:  3/2/21  Last Fill Quantity: 90,  # refills: 2   Last office visit provider:  10/20/21     Requested Prescriptions   Pending Prescriptions Disp Refills     XARELTO ANTICOAGULANT 20 MG TABS tablet [Pharmacy Med Name: XARELTO TABS 20MG] 90 tablet 3     Sig: TAKE 1 TABLET DAILY       Direct Oral Anticoagulant Agents Failed - 11/21/2021  4:33 AM        Failed - Creatinine Clearance greater than 50 ml/min on file in past 3 mos     No lab results found.          Passed - Normal Platelets on file in past 12 months     Recent Labs   Lab Test 10/20/21  1007                  Passed - Medication is active on med list        Passed - Serum creatinine less than or equal to 1.4 on file in past 3 mos     Recent Labs   Lab Test 10/20/21  1007   CR 0.82       Ok to refill medication if creatinine is low          Passed - Patient is 18 years of age or older        Passed - No active pregnancy on record        Passed - No positive pregnancy test within past 12 months        Passed - Recent (6 mo) or future (30 days) visit within the authorizing provider's specialty             Juan Thurston RN 11/23/21 11:30 AM

## 2021-12-17 ENCOUNTER — TRANSFERRED RECORDS (OUTPATIENT)
Dept: HEALTH INFORMATION MANAGEMENT | Facility: CLINIC | Age: 61
End: 2021-12-17
Payer: OTHER GOVERNMENT

## 2021-12-17 LAB — TSH SERPL-ACNC: 1 UIU/ML (ref 0.45–4.5)

## 2022-01-27 ENCOUNTER — OFFICE VISIT (OUTPATIENT)
Dept: FAMILY MEDICINE | Facility: CLINIC | Age: 62
End: 2022-01-27
Payer: OTHER GOVERNMENT

## 2022-01-27 VITALS
TEMPERATURE: 98.6 F | BODY MASS INDEX: 26.57 KG/M2 | WEIGHT: 150 LBS | SYSTOLIC BLOOD PRESSURE: 109 MMHG | RESPIRATION RATE: 16 BRPM | HEART RATE: 81 BPM | DIASTOLIC BLOOD PRESSURE: 73 MMHG

## 2022-01-27 DIAGNOSIS — M54.2 CERVICAL PAIN (NECK): ICD-10-CM

## 2022-01-27 DIAGNOSIS — S63.502A SPRAIN OF LEFT WRIST, INITIAL ENCOUNTER: Primary | ICD-10-CM

## 2022-01-27 PROCEDURE — 99213 OFFICE O/P EST LOW 20 MIN: CPT | Performed by: FAMILY MEDICINE

## 2022-01-27 RX ORDER — DIAZEPAM 10 MG
TABLET ORAL
Qty: 15 TABLET | Refills: 0 | Status: SHIPPED | OUTPATIENT
Start: 2022-01-27 | End: 2022-06-27

## 2022-01-27 NOTE — PROGRESS NOTES
OUTPATIENT VISIT NOTE                                                   Date of Visit: 1/27/2022     Chief Complaint   Patient presents with:  Fall: fell  3 days ago still having pain left wrist and hand            History of Present Illness   Trenton Milian is a 61 year old female Fell on outstretched arms three days ago.  Has pain on left wrist and at base of left thumb       MEDICATIONS   Current Outpatient Medications   Medication     calcium carbonate-vitamin D3 (CALTRATE 600 PLUS D3) 600 mg(1,500mg) -400 unit per tablet     cetirizine (ZYRTEC) 10 MG tablet     diazepam (VALIUM) 10 MG tablet     LINZESS 72 MCG capsule     omeprazole (PRILOSEC) 20 MG DR capsule     PROAIR HFA 90 mcg/actuation inhaler     sertraline (ZOLOFT) 25 MG tablet     traZODone (DESYREL) 50 MG tablet     XARELTO ANTICOAGULANT 20 MG TABS tablet     CERAVE Crea cream     gabapentin (NEURONTIN) 300 MG capsule     MUCINEX 600 mg 12 hr tablet     triamcinolone (KENALOG) 0.1 % external cream     VOLTAREN 1 % Gel     No current facility-administered medications for this visit.         SOCIAL HISTORY   Social History     Tobacco Use     Smoking status: Current Every Day Smoker     Packs/day: 0.50     Years: 40.00     Pack years: 20.00     Types: Cigarettes     Smokeless tobacco: Never Used   Substance Use Topics     Alcohol use: Never           Physical Exam   Vitals:    01/27/22 1435   BP: 109/73   Pulse: 81   Resp: 16   Temp: 98.6  F (37  C)   TempSrc: Oral   Weight: 68 kg (150 lb)        GEN:  NAD  LEFT WRIST: normal radial pulse.  Tender in snuff box.  Tender distal radius     Diagnostic Studies   LABS:  Results for orders placed or performed in visit on 01/27/22   XR Wrist Left Navicular GE 3  Views     Status: None    Narrative    EXAM: XR WRIST NAVICULAR  LEFT GE 3  VIEWS  LOCATION: [facility]  DATE/TIME: 1/27/2022 2:49 PM    INDICATION:  Left wrist pain.  COMPARISON: None.      Impression    IMPRESSION:  Degenerative change at the STT joint. Old fracture fragment adjacent to the first CMC joint. This is well corticated and chronic and not suggestive of acute injury. The left wrist is otherwise negative.            Assessment and Plan     Sprain of left wrist, initial encounter  Sprain of left wrist, however with snuff box tenderness, placed in thumb spica splint.  Needs to be recheck in 7-10 days.  Ice, tylenol as needed  - XR Wrist Left G/E 3 Views  - XR Wrist Left Navicular GE 3  Views  - Wrist/Arm Supplies Order  - Orthopedic  Referral    Cervical pain (neck)  Requested refill of valium which she uses on occasion for pain following surgery several years ago  - diazepam (VALIUM) 10 MG tablet  Dispense: 15 tablet; Refill: 0                   Discussed signs / symptoms that warrant urgent / emergent medical attention.     Recheck if worsening or not improving.       Eden Rosario MD          Pertinent History     The following portions of the patient's history were reviewed and updated as appropriate: allergies, current medications, past family history, past medical history, past social history, past surgical history and problem list.

## 2022-02-01 ENCOUNTER — TRANSFERRED RECORDS (OUTPATIENT)
Dept: HEALTH INFORMATION MANAGEMENT | Facility: CLINIC | Age: 62
End: 2022-02-01
Payer: OTHER GOVERNMENT

## 2022-02-02 ENCOUNTER — LAB (OUTPATIENT)
Dept: LAB | Facility: CLINIC | Age: 62
End: 2022-02-02
Payer: OTHER GOVERNMENT

## 2022-02-02 DIAGNOSIS — Z78.9 PATIENT TRAVELS: ICD-10-CM

## 2022-02-02 PROCEDURE — U0005 INFEC AGEN DETEC AMPLI PROBE: HCPCS

## 2022-02-02 PROCEDURE — U0003 INFECTIOUS AGENT DETECTION BY NUCLEIC ACID (DNA OR RNA); SEVERE ACUTE RESPIRATORY SYNDROME CORONAVIRUS 2 (SARS-COV-2) (CORONAVIRUS DISEASE [COVID-19]), AMPLIFIED PROBE TECHNIQUE, MAKING USE OF HIGH THROUGHPUT TECHNOLOGIES AS DESCRIBED BY CMS-2020-01-R: HCPCS

## 2022-02-03 LAB — SARS-COV-2 RNA RESP QL NAA+PROBE: NEGATIVE

## 2022-02-22 ENCOUNTER — TRANSFERRED RECORDS (OUTPATIENT)
Dept: HEALTH INFORMATION MANAGEMENT | Facility: CLINIC | Age: 62
End: 2022-02-22
Payer: OTHER GOVERNMENT

## 2022-02-25 ENCOUNTER — TRANSFERRED RECORDS (OUTPATIENT)
Dept: HEALTH INFORMATION MANAGEMENT | Facility: CLINIC | Age: 62
End: 2022-02-25
Payer: OTHER GOVERNMENT

## 2022-03-01 ENCOUNTER — TRANSFERRED RECORDS (OUTPATIENT)
Dept: HEALTH INFORMATION MANAGEMENT | Facility: CLINIC | Age: 62
End: 2022-03-01
Payer: OTHER GOVERNMENT

## 2022-03-11 ENCOUNTER — TRANSFERRED RECORDS (OUTPATIENT)
Dept: HEALTH INFORMATION MANAGEMENT | Facility: CLINIC | Age: 62
End: 2022-03-11
Payer: OTHER GOVERNMENT

## 2022-03-17 ENCOUNTER — TRANSFERRED RECORDS (OUTPATIENT)
Dept: HEALTH INFORMATION MANAGEMENT | Facility: CLINIC | Age: 62
End: 2022-03-17
Payer: OTHER GOVERNMENT

## 2022-03-23 ENCOUNTER — TRANSFERRED RECORDS (OUTPATIENT)
Dept: HEALTH INFORMATION MANAGEMENT | Facility: CLINIC | Age: 62
End: 2022-03-23
Payer: OTHER GOVERNMENT

## 2022-03-24 ENCOUNTER — TRANSFERRED RECORDS (OUTPATIENT)
Dept: HEALTH INFORMATION MANAGEMENT | Facility: CLINIC | Age: 62
End: 2022-03-24
Payer: OTHER GOVERNMENT

## 2022-04-05 ENCOUNTER — TRANSFERRED RECORDS (OUTPATIENT)
Dept: HEALTH INFORMATION MANAGEMENT | Facility: CLINIC | Age: 62
End: 2022-04-05
Payer: OTHER GOVERNMENT

## 2022-04-27 ENCOUNTER — VIRTUAL VISIT (OUTPATIENT)
Dept: FAMILY MEDICINE | Facility: CLINIC | Age: 62
End: 2022-04-27
Payer: OTHER GOVERNMENT

## 2022-04-27 DIAGNOSIS — U07.1 INFECTION DUE TO 2019 NOVEL CORONAVIRUS: Primary | ICD-10-CM

## 2022-04-27 DIAGNOSIS — M94.0 COSTOCHONDRITIS: ICD-10-CM

## 2022-04-27 PROCEDURE — 99213 OFFICE O/P EST LOW 20 MIN: CPT | Mod: 95 | Performed by: FAMILY MEDICINE

## 2022-04-27 NOTE — PROGRESS NOTES
Trenton is a 61 year old who is being evaluated via a billable video visit.      How would you like to obtain your AVS? MyChart  If the video visit is dropped, the invitation should be resent by: Text to cell phone: [phone]  Will anyone else be joining your video visit? No      Video Start Time: 2:27 PM    Assessment & Plan     Infection due to 2019 novel coronavirus  Costochondritis  -Discussed reproducible chest pain with palpation likely MSK (costchondritis). Reviewed that this can happen with acute infections like covid. Expect to improve over time. Can continue advil to help.  -No cough or SOB that would suggest PE or pneumonia, patient is on anticoagulation.  -Out of 5 day treatment window for antiviral  -Reviewed symptomatic care  -ER/UC for worsening symptoms       Dheeraj Jose DO  [facility]    Subjective   Trenton is a 61 year old who presents for the following health issues     HPI      sick at home last week.  Patient developed symptoms about 5 days after (4/21/22). Took at home covid tests and were positive. Started with stuffy nose and sore throat along with body aches. Having headaches, tightness and chest discomfort. Headaches relieved with advil and acetaminophen. States intermittent chest pressure is what bothers her the most. Pressure is over there sternum, worse with deep breathing, reproducible tenderness when she pushes on this area, no radiation. Denies SOB, cough, lightheadedness, dizziness.    COVID-19 Symptom Review  How many days ago did these symptoms start? X 6 DAYS     Are any of the following symptoms significant for you?    New or worsening difficulty breathing? No    Worsening cough? No    Fever or chills? Yes, I felt feverish or had chills.    Headache: YES    Sore throat: YES    Chest pain: YES    Diarrhea: no    Body aches? YES    What treatments has patient tried?  Tylenol   Does patient live in a nursing home, group home, or shelter?  no  Does patient have a way to get food/medications during quarantined? Yes, I have a friend or family member who can help me.               No flowsheet data found.      Review of Systems         Objective       Vitals:  No vitals were obtained today due to virtual visit.    Physical Exam   GENERAL: Healthy, alert and no distress  EYES: Eyes grossly normal to inspection.  No discharge or erythema, or obvious scleral/conjunctival abnormalities.  RESP: No audible wheeze, cough, or visible cyanosis.  No visible retractions or increased work of breathing.    SKIN: Visible skin clear. No significant rash, abnormal pigmentation or lesions.  NEURO: Cranial nerves grossly intact.  Mentation and speech appropriate for age.  PSYCH: Mentation appears normal, affect normal/bright, judgement and insight intact, normal speech and appearance well-groomed.            Video-Visit Details    Type of service:  Video Visit    Video End Time:2:27 PM    Originating Location (pt. Location): Home    Distant Location (provider location):  [facility]     Platform used for Video Visit: [employer]

## 2022-05-04 DIAGNOSIS — Z91.09 ENVIRONMENTAL ALLERGIES: ICD-10-CM

## 2022-05-07 RX ORDER — CETIRIZINE HYDROCHLORIDE 10 MG/1
TABLET ORAL
Qty: 90 TABLET | Refills: 3 | Status: SHIPPED | OUTPATIENT
Start: 2022-05-07 | End: 2023-09-05

## 2022-05-07 NOTE — TELEPHONE ENCOUNTER
"Routing refill request to provider for review/approval because:  Allergy/contraindication    Last Written Prescription Date:  9/16/21  Last Fill Quantity: 90,  # refills: 2   Last office visit provider:  1/27/22     Requested Prescriptions   Pending Prescriptions Disp Refills     cetirizine (ZYRTEC) 10 MG tablet [Pharmacy Med Name: CETIRIZINE TABS-OTC 10MG] 90 tablet 3     Sig: TAKE 1 TABLET DAILY       Antihistamines Protocol Passed - 5/4/2022  2:07 AM        Passed - Patient is 3-64 years of age     Apply weight-based dosing for peds patients age 3 - 12 years of age.    Forward request to provider for patients under the age of 3 or over the age of 64.          Passed - Recent (12 mo) or future (30 days) visit within the authorizing provider's specialty     Patient has had an office visit with the authorizing provider or a provider within the authorizing providers department within the previous 12 mos or has a future within next 30 days. See \"Patient Info\" tab in inbasket, or \"Choose Columns\" in Meds & Orders section of the refill encounter.              Passed - Medication is active on med list             Lluvia Scott 05/07/22 5:29 PM    "

## 2022-05-13 ENCOUNTER — TRANSFERRED RECORDS (OUTPATIENT)
Dept: HEALTH INFORMATION MANAGEMENT | Facility: CLINIC | Age: 62
End: 2022-05-13
Payer: OTHER GOVERNMENT

## 2022-05-22 ENCOUNTER — HEALTH MAINTENANCE LETTER (OUTPATIENT)
Age: 62
End: 2022-05-22

## 2022-06-12 ENCOUNTER — OFFICE VISIT (OUTPATIENT)
Dept: FAMILY MEDICINE | Facility: CLINIC | Age: 62
End: 2022-06-12
Payer: OTHER GOVERNMENT

## 2022-06-12 VITALS
SYSTOLIC BLOOD PRESSURE: 119 MMHG | WEIGHT: 148 LBS | TEMPERATURE: 98.2 F | OXYGEN SATURATION: 98 % | RESPIRATION RATE: 16 BRPM | BODY MASS INDEX: 26.22 KG/M2 | HEART RATE: 75 BPM | DIASTOLIC BLOOD PRESSURE: 76 MMHG

## 2022-06-12 DIAGNOSIS — L03.115 CELLULITIS OF RIGHT LOWER EXTREMITY: Primary | ICD-10-CM

## 2022-06-12 DIAGNOSIS — S80.819A ABRASION OF ANTERIOR LOWER LEG, UNSPECIFIED LATERALITY, INITIAL ENCOUNTER: ICD-10-CM

## 2022-06-12 DIAGNOSIS — Z79.01 ANTICOAGULATED: ICD-10-CM

## 2022-06-12 PROCEDURE — 99214 OFFICE O/P EST MOD 30 MIN: CPT | Performed by: FAMILY MEDICINE

## 2022-06-12 RX ORDER — CEPHALEXIN 500 MG/1
500 CAPSULE ORAL 3 TIMES DAILY
Qty: 30 CAPSULE | Refills: 0 | Status: SHIPPED | OUTPATIENT
Start: 2022-06-12 | End: 2022-06-22

## 2022-06-12 NOTE — PATIENT INSTRUCTIONS
Continue gentle wound care, the abrasion needs to heal from below.   Elevate at able    Cephalexin three times a day for 7-10 days  Eat yogurt or take probiotics  while on antibiotics.     If itching develops, stop the medication and contact primary care or return for wound recheck and change in antibiotic if still indicted.     You have been removing the wound slough in the shower with dilute hydrogen peroxide and then applying triple antibiotic and leaving open.   You could keep the wound covered after applying the antibiotic ointment or vaseline and nonstick pad and a coban wrap - this will prevent so much build up of slough and allow a clean base for new skin growth.     Watch for increasing signs of infection, more swelling, redness, pain, warmth, purulent drainage, fever and return for reevaluation.

## 2022-06-12 NOTE — PROGRESS NOTES
Assessment/Plan:   Abrasion of anterior lower leg, unspecified laterality, initial encounter  Cellulitis of right lower extremity  Anticoagulated  Large abrasion with a vertical 6x0.5cm strip of skin loss along right shin. Wound healing looks appropriate. No strong signs of wound infection although the mild swelling and redness and tenderness around the wound and calf/ankle/foot have been increasing. Likely related to recent travel and increased activity. Consider DVT though she is taking Xarelto.  We elected not to pursue lower extremity ultrasound at this time.  Considered but no sign of tibia fracture or suspicion for osteomyelitis at this time or compartment syndrome.  Possible infection and so we will cover with antibiotic for possible developing cellulitis and wound infection. Left shin wound is more superficial and healing well.   There was no active purulent drainage for wound culture, simply some fibrinous wound slough covered with triple antibiotic ointment.   She has multiple allergies. She gets itchy from -cillins. Does not recall trying cephalexin recently and it is not on her allergy list. Also allergic to sulfa and clindamycin. We will start cephalexin but discontinue if she develops itching. Could consider change to doxycycline, cipro, or azithromycin.   Reviewed wound care. She has been removing the wound slough in the shower with dilute hydrogen peroxide and then applying triple antibiotic and leaving open. Could keep covered with antibiotic ointment or vaseline and nonstick pad and a coban wrap to prevent so much build up of slough and allow for a clean base for new skin growth.   Take cephalexin 3 times a day for 7-10 days.   Recheck if worse or no better in 3-5 days.   - cephALEXin (KEFLEX) 500 MG capsule; Take 1 capsule (500 mg) by mouth 3 times daily for 10 days  Dispense: 30 capsule; Refill: 0    I discussed red flag symptoms, return precautions to clinic/ER and follow up care with  patient/parent.  Expected clinical course, symptomatic cares advised. Questions answered. Patient/parent amenable with plan.    Continue gentle wound care, the abrasion needs to heal from below.   Elevate at able    Cephalexin three times a day for 7-10 days  Eat yogurt or take probiotics  while on antibiotics.     If itching develops, stop the medication and contact primary care or return for wound recheck and change in antibiotic if still indicted.     Watch for increasing signs of infection, more swelling, redness, pain, warmth, purulent drainage, fever and return for reevaluation.     Subjective:     Trenton Milian is a 61 year old female who presents for evaluation of wounds on her lower legs.  The injury occurred about a week ago on 6/6/2022.  She was in [place] helping her kids move.  She was moving a countertop by herself and it slipped out of her hands and vertically scraped down her shins.  She has about a 6cm vertical scrape on her right shin, 2 cm vertical more superficial scrape on her left shin.  She is on a blood thinner and had pretty vigorous bleeding initially.  It was easily controlled.  She has been taking care of the wounds by removing the wound slough and debris with a Q-tip and a mixture of hydrogen peroxide and water prior to her shower.  She has been then applying triple antibiotic ointment and leaving the wounds open.  She traveled back home the other day and has been more active.  She is noted some increased swelling in her ankles and lower legs right greater than left.  She has aching in her calf with walking.  Some pain with dorsiflexion on the right.  She believes there is some mild redness and increased tenderness around the wound including the calf on the left.  She has no fever or systemic symptoms of illness.  No numbness or tingling in the toes.  She has been taking her blood thinner regularly.  She has also been doing some yard work and has some other various scratches on her legs  and arms.  There is a bruise and swelling around the scrape on her left forearm.  She has several antibiotic allergies.  She gets very itchy from the penicillins amoxicillin and Augmentin.  No rash or hives just intense itching of her hands and feet and groin.  This occurs within 20 minutes of the first dose.  It goes away right away when the antibiotic is discontinued.  She is also allergic to sulfa and clindamycin.  She denies fever chills sore throat runny nose cough malaise chest pain shortness of breath or other symptoms of systemic illness.       Allergies   Allergen Reactions     Augmentin Unknown     itchy     Clindamycin Unknown     Fentanyl Unknown     itchy     Hydroxyzine      Penicillins Unknown     itchy     Septra [Sulfamethoxazole W/Trimethoprim] Unknown     itchy     Tramadol Unknown     itchy     Zyban [Bupropion] Unknown     Personality Change     Current Outpatient Medications   Medication     calcium carbonate-vitamin D3 (CALTRATE 600 PLUS D3) 600 mg(1,500mg) -400 unit per tablet     cephALEXin (KEFLEX) 500 MG capsule     CERAVE Crea cream     cetirizine (ZYRTEC) 10 MG tablet     diazepam (VALIUM) 10 MG tablet     LINZESS 72 MCG capsule     MUCINEX 600 mg 12 hr tablet     omeprazole (PRILOSEC) 20 MG DR capsule     PROAIR HFA 90 mcg/actuation inhaler     sertraline (ZOLOFT) 25 MG tablet     traZODone (DESYREL) 50 MG tablet     triamcinolone (KENALOG) 0.1 % external cream     VOLTAREN 1 % Gel     XARELTO ANTICOAGULANT 20 MG TABS tablet     gabapentin (NEURONTIN) 300 MG capsule     No current facility-administered medications for this visit.     Patient Active Problem List   Diagnosis     Left leg DVT (H)     Lupus anticoagulant disorder (H)     Esophageal stricture       Objective:     /76   Pulse 75   Temp 98.2  F (36.8  C) (Oral)   Resp 16   Wt 67.1 kg (148 lb)   SpO2 98%   BMI 26.22 kg/m      Physical    General Appearance: Alert, pleasant, no distress, aVSS  Head: Normocephalic,  without obvious abnormality, atraumatic  Eyes: Conjunctivae are normal.  Lungs: respirations unlabored  Extremities: mild R>L lower extremity swelling around lower leg and ankle/foot. Palpable pedal pulses. Normal cap refill. Normal sensation. Mild calf tenderness with palpation and increased pain with dorsiflexion on the right side. There is a 6cm vertical abrasion through the skin along the lower shin right leg. There is yellow wound slough in the wound. No scabbing. No purulent drainage, no adjacent induration or fluctuance. Mild diffuse swelling and tenderness with palpation.   The left lower shin has a 2cm vertical abrasion, 1cm wide, more superficial than the left side with no surrounding redness, mild diffuse tenderness and scant scabbing.   Both lower legs with scattered scratches and some bruising.   Left forearm with scratch and adjacent bruise, possible soft hematoma.   No active bleeding.   Skin: as above.   Psychiatric: Patient has a normal mood and affect.

## 2022-06-27 ENCOUNTER — OFFICE VISIT (OUTPATIENT)
Dept: FAMILY MEDICINE | Facility: CLINIC | Age: 62
End: 2022-06-27
Payer: OTHER GOVERNMENT

## 2022-06-27 VITALS
HEART RATE: 64 BPM | DIASTOLIC BLOOD PRESSURE: 62 MMHG | RESPIRATION RATE: 12 BRPM | WEIGHT: 146.9 LBS | BODY MASS INDEX: 25.08 KG/M2 | TEMPERATURE: 98.9 F | SYSTOLIC BLOOD PRESSURE: 108 MMHG | HEIGHT: 64 IN

## 2022-06-27 DIAGNOSIS — Z86.718 HISTORY OF RECURRENT DEEP VEIN THROMBOSIS (DVT): ICD-10-CM

## 2022-06-27 DIAGNOSIS — Z12.31 VISIT FOR SCREENING MAMMOGRAM: ICD-10-CM

## 2022-06-27 DIAGNOSIS — R60.1 GENERALIZED EDEMA: ICD-10-CM

## 2022-06-27 DIAGNOSIS — Z00.00 ROUTINE GENERAL MEDICAL EXAMINATION AT A HEALTH CARE FACILITY: Primary | ICD-10-CM

## 2022-06-27 DIAGNOSIS — J45.20 MILD INTERMITTENT ASTHMA WITHOUT COMPLICATION: ICD-10-CM

## 2022-06-27 DIAGNOSIS — R71.8 ELEVATED MCV: ICD-10-CM

## 2022-06-27 DIAGNOSIS — R63.5 WEIGHT GAIN: ICD-10-CM

## 2022-06-27 DIAGNOSIS — M54.2 CERVICAL PAIN (NECK): ICD-10-CM

## 2022-06-27 PROBLEM — F41.9 ANXIETY DISORDER, UNSPECIFIED: Status: ACTIVE | Noted: 2017-06-12

## 2022-06-27 PROBLEM — Z87.11 HISTORY OF GASTRIC ULCER: Status: ACTIVE | Noted: 2022-06-27

## 2022-06-27 LAB
ANION GAP SERPL CALCULATED.3IONS-SCNC: 10 MMOL/L (ref 5–18)
BUN SERPL-MCNC: 16 MG/DL (ref 8–22)
CALCIUM SERPL-MCNC: 9.6 MG/DL (ref 8.5–10.5)
CHLORIDE BLD-SCNC: 103 MMOL/L (ref 98–107)
CO2 SERPL-SCNC: 28 MMOL/L (ref 22–31)
CREAT SERPL-MCNC: 0.79 MG/DL (ref 0.6–1.1)
ERYTHROCYTE [DISTWIDTH] IN BLOOD BY AUTOMATED COUNT: 13 % (ref 10–15)
FERRITIN SERPL-MCNC: 46 NG/ML (ref 10–130)
FOLATE SERPL-MCNC: 15.2 NG/ML
GFR SERPL CREATININE-BSD FRML MDRD: 84 ML/MIN/1.73M2
GLUCOSE BLD-MCNC: 95 MG/DL (ref 70–125)
HCT VFR BLD AUTO: 43.6 % (ref 35–47)
HGB BLD-MCNC: 14.5 G/DL (ref 11.7–15.7)
MCH RBC QN AUTO: 33 PG (ref 26.5–33)
MCHC RBC AUTO-ENTMCNC: 33.3 G/DL (ref 31.5–36.5)
MCV RBC AUTO: 99 FL (ref 78–100)
PLATELET # BLD AUTO: 254 10E3/UL (ref 150–450)
POTASSIUM BLD-SCNC: 4.6 MMOL/L (ref 3.5–5)
RBC # BLD AUTO: 4.4 10E6/UL (ref 3.8–5.2)
SODIUM SERPL-SCNC: 141 MMOL/L (ref 136–145)
TSH SERPL DL<=0.005 MIU/L-ACNC: 0.91 UIU/ML (ref 0.3–5)
VIT B12 SERPL-MCNC: 392 PG/ML (ref 213–816)
WBC # BLD AUTO: 8.2 10E3/UL (ref 4–11)

## 2022-06-27 PROCEDURE — 99213 OFFICE O/P EST LOW 20 MIN: CPT | Mod: 25 | Performed by: FAMILY MEDICINE

## 2022-06-27 PROCEDURE — 84443 ASSAY THYROID STIM HORMONE: CPT | Performed by: FAMILY MEDICINE

## 2022-06-27 PROCEDURE — 82746 ASSAY OF FOLIC ACID SERUM: CPT | Performed by: FAMILY MEDICINE

## 2022-06-27 PROCEDURE — 36415 COLL VENOUS BLD VENIPUNCTURE: CPT | Performed by: FAMILY MEDICINE

## 2022-06-27 PROCEDURE — 85027 COMPLETE CBC AUTOMATED: CPT | Performed by: FAMILY MEDICINE

## 2022-06-27 PROCEDURE — 99396 PREV VISIT EST AGE 40-64: CPT | Performed by: FAMILY MEDICINE

## 2022-06-27 PROCEDURE — 82607 VITAMIN B-12: CPT | Performed by: FAMILY MEDICINE

## 2022-06-27 PROCEDURE — 80048 BASIC METABOLIC PNL TOTAL CA: CPT | Performed by: FAMILY MEDICINE

## 2022-06-27 PROCEDURE — 82728 ASSAY OF FERRITIN: CPT | Performed by: FAMILY MEDICINE

## 2022-06-27 RX ORDER — DIAZEPAM 10 MG
TABLET ORAL
Qty: 15 TABLET | Refills: 0 | Status: SHIPPED | OUTPATIENT
Start: 2022-06-27 | End: 2022-10-07

## 2022-06-27 ASSESSMENT — ENCOUNTER SYMPTOMS
DYSURIA: 0
PARESTHESIAS: 0
ABDOMINAL PAIN: 1
EYE PAIN: 0
HEMATURIA: 0
CONSTIPATION: 1
DIARRHEA: 0
WEAKNESS: 0
BREAST MASS: 0
HEMATOCHEZIA: 0
JOINT SWELLING: 1
CHILLS: 0
HEARTBURN: 0
SHORTNESS OF BREATH: 0
HEADACHES: 0
SORE THROAT: 0
ARTHRALGIAS: 0
FREQUENCY: 0
COUGH: 0
FEVER: 0
DIZZINESS: 0
NAUSEA: 0
NERVOUS/ANXIOUS: 0
MYALGIAS: 0
PALPITATIONS: 0

## 2022-06-27 ASSESSMENT — PATIENT HEALTH QUESTIONNAIRE - PHQ9
10. IF YOU CHECKED OFF ANY PROBLEMS, HOW DIFFICULT HAVE THESE PROBLEMS MADE IT FOR YOU TO DO YOUR WORK, TAKE CARE OF THINGS AT HOME, OR GET ALONG WITH OTHER PEOPLE: NOT DIFFICULT AT ALL
SUM OF ALL RESPONSES TO PHQ QUESTIONS 1-9: 4
SUM OF ALL RESPONSES TO PHQ QUESTIONS 1-9: 4

## 2022-06-27 NOTE — LETTER
[facility]  06/27/22  Patient: Trenton Milian  YOB: 1960  Medical Record Number: [id number]                                                                                  Non-Opioid Controlled Substance Agreement    This is an agreement between you and your provider regarding safe and appropriate use of controlled substances prescribed by your care team. Controlled substances are?medicines that can cause physical and mental dependence (abuse).     There are strict laws about having and using these medicines. We here at [facility] are  committed to working with you in your efforts to get better. To support you in this work, we'll help you schedule regular office appointments for medicine refills. If we must cancel or change your appointment for any reason, we'll make sure you have enough medicine to last until your next appointment.     As a Provider, I will:     Listen carefully to your concerns while treating you with respect.     Recommend a treatment plan that I believe is in your best interest and may involve therapies other than medicine.      Talk with you often about the possible benefits and the risk of harm of any medicine that we prescribe for you.    Assess the safety of this medicine and check how well it works.      Provide a plan on how to taper (discontinue or go off) using this medicine if the decision is made to stop its use.      ::  As a Patient, I understand controlled substances:       Are prescribed by my care provider to help me function or work and manage my condition(s).?    Are strong medicines and can cause serious side effects.       Need to be taken exactly as prescribed.?Combining controlled substances with certain medicines or chemicals (such as illegal drugs, alcohol, sedatives, sleeping pills, and benzodiazepines) can be dangerous or even fatal.? If I stop taking my medicines suddenly, I may have severe withdrawal symptoms.     The  risks, benefits, and side effects of these medicine(s) were explained to me. I agree that:    1. I will take part in other treatments as advised by my care team. This may be psychiatry or counseling, physical therapy, behavioral therapy, group treatment or a referral to specialist.    2. I will keep all my appointments and understand this is part of the monitoring of controlled substances.?My care team may require an office visit for EVERY controlled substance refill. If I miss appointments or don t follow instructions, my care team may stop my medicine    3. I will take my medicines as prescribed. I will not change the dose or schedule unless my care team tells me to. There will be no refills if I run out early.      4. I may be asked to come to the clinic and complete a urine drug test or complete a pill count. If I don t give a urine sample or participate in a pill count, the care team may stop my medicine.    5. I will only receive controlled substance prescriptions from this clinic. If I am treated by another provider, I will tell them that I am taking controlled substances and that I have a treatment agreement with this provider. I will inform my [facility] care team within one business day if I am given a prescription for any controlled substance by another healthcare provider. My [facility] care team can contact other providers and pharmacists about my use of any medicines.    6. It is up to me to make sure that I don't run out of my medicines on weekends or holidays.?If my care team is willing to refill my prescription without a visit, I must request refills only during office hours. Refills may take up to 3 business days to process. I will use one pharmacy to fill all my controlled substance prescriptions. I will notify the clinic about any changes to my insurance or medicine availability.    7. I am responsible for my prescriptions. If the medicine/prescription is lost, stolen or destroyed,  it will not be replaced.?I also agree not to share controlled substance medicines with anyone.     8. I am aware I should not use any illegal or recreational drugs. I agree not to drink alcohol unless my care team says I can.     9. If I enroll in the [place] Medical Cannabis program, I will tell my care team before my next refill.    10. I will tell my care team right away if I become pregnant, have a new medical problem treated outside of my regular clinic, or have a change in my medicines.     11. I understand that this medicine can affect my thinking, judgment and reaction time.? Alcohol and drugs affect the brain and body, which can affect the safety of my driving. Being under the influence of alcohol or drugs can affect my decision-making, behaviors, personal safety and the safety of others. Driving while impaired (DWI) can occur if a person is driving, operating or in physical control of a car, motorcycle, boat, snowmobile, ATV, motorbike, off-road vehicle or any other motor vehicle ([place] Statute 169A.20). I understand the risk if I choose to drive or operate any vehicle or machinery.    I understand that if I do not follow any of the conditions above, my prescriptions or treatment may be stopped or changed.   I agree that my provider, clinic care team and pharmacy may work with any city, state or federal law enforcement agency that investigates the misuse, sale or other diversion of my controlled medicine. I will allow my provider to discuss my care with, or share a copy of, this agreement with any other treating provider, pharmacy or emergency room where I receive care.     I have read this agreement and have asked questions about anything I did not understand.    ________________________________________________________  Patient Signature - Trenton Milian     ___________________                   Date     ________________________________________________________  Provider Signature - Rodrick Granados,  DO       ___________________                   Date     ________________________________________________________  Witness Signature (required if provider not present while patient signing)          ___________________                   Date

## 2022-06-27 NOTE — ASSESSMENT & PLAN NOTE
Mild but present.  Given that as well as the skin changes and hair changes will check thyroid as well as CBC.  Given her history of underlying iron deficiency as well as elevated MCV, will check CBC along with B12/folate and ferritin levels.

## 2022-06-27 NOTE — LETTER
My Asthma Action Plan    Name: Trenton Milian   YOB: 1960  Date: 6/27/2022   My doctor: Rodrick Granados, DO   My clinic: [facility]        My Rescue Medicine:   Albuterol inhaler (Proair/Ventolin/Proventil HFA)  2-4 puffs EVERY 4 HOURS as needed. Use a spacer if recommended by your provider.   My Asthma Severity:   Intermittent / Exercise Induced  Know your asthma triggers: upper respiratory infections and exercise or sports             GREEN ZONE   Good Control    I feel good    No cough or wheeze    Can work, sleep and play without asthma symptoms       Take your asthma control medicine every day.     1. If exercise triggers your asthma, take your rescue medication    15 minutes before exercise or sports, and    During exercise if you have asthma symptoms  2. Spacer to use with inhaler: If you have a spacer, make sure to use it with your inhaler             YELLOW ZONE Getting Worse  I have ANY of these:    I do not feel good    Cough or wheeze    Chest feels tight    Wake up at night   1. Keep taking your Green Zone medications  2. Start taking your rescue medicine:    every 20 minutes for up to 1 hour. Then every 4 hours for 24-48 hours.  3. If you stay in the Yellow Zone for more than 12-24 hours, contact your doctor.  4. If you do not return to the Green Zone in 12-24 hours or you get worse, start taking your oral steroid medicine if prescribed by your provider.           RED ZONE Medical Alert - Get Help  I have ANY of these:    I feel awful    Medicine is not helping    Breathing getting harder    Trouble walking or talking    Nose opens wide to breathe       1. Take your rescue medicine NOW  2. If your provider has prescribed an oral steroid medicine, start taking it NOW  3. Call your doctor NOW  4. If you are still in the Red Zone after 20 minutes and you have not reached your doctor:    Take your rescue medicine again and    Call 911 or go to the emergency  room right away    See your regular doctor within 2 weeks of an Emergency Room or Urgent Care visit for follow-up treatment.          Annual Reminders:  Meet with Asthma Educator,  Flu Shot in the Fall, consider Pneumonia Vaccination for patients with asthma (aged 19 and older).    Pharmacy:    [facility] - [facility] [address] [id number] [address] AT [facility] [address]    Electronically signed by Rodrick Granados, DO   Date: 06/27/22                    Asthma Triggers  How To Control Things That Make Your Asthma Worse    Triggers are things that make your asthma worse.  Look at the list below to help you find your triggers and   what you can do about them. You can help prevent asthma flare-ups by staying away from your triggers.      Trigger                                                          What you can do   Cigarette Smoke  Tobacco smoke can make asthma worse. Do not allow smoking in your home, car or around you.  Be sure no one smokes at a child s day care or school.  If you smoke, ask your health care provider for ways to help you quit.  Ask family members to quit too.  Ask your health care provider for a referral to Quit Plan to help you quit smoking, or call 2-728-368-PLAN.     Colds, Flu, Bronchitis  These are common triggers of asthma. Wash your hands often.  Don t touch your eyes, nose or mouth.  Get a flu shot every year.     Dust Mites  These are tiny bugs that live in cloth or carpet. They are too small to see. Wash sheets and blankets in hot water every week.   Encase pillows and mattress in dust mite proof covers.  Avoid having carpet if you can. If you have carpet, vacuum weekly.   Use a dust mask and HEPA vacuum.   Pollen and Outdoor Mold  Some people are allergic to trees, grass, or weed pollen, or molds. Try to keep your windows closed.  Limit time out doors when pollen count is high.   Ask you  health care provider about taking medicine during allergy season.     Animal Dander  Some people are allergic to skin flakes, urine or saliva from pets with fur or feathers. Keep pets with fur or feathers out of your home.    If you can t keep the pet outdoors, then keep the pet out of your bedroom.  Keep the bedroom door closed.  Keep pets off cloth furniture and away from stuffed toys.     Mice, Rats, and Cockroaches  Some people are allergic to the waste from these pests.   Cover food and garbage.  Clean up spills and food crumbs.  Store grease in the refrigerator.   Keep food out of the bedroom.   Indoor Mold  This can be a trigger if your home has high moisture. Fix leaking faucets, pipes, or other sources of water.   Clean moldy surfaces.  Dehumidify basement if it is damp and smelly.   Smoke, Strong Odors, and Sprays  These can reduce air quality. Stay away from strong odors and sprays, such as perfume, powder, hair spray, paints, smoke incense, paint, cleaning products, candles and new carpet.   Exercise or Sports  Some people with asthma have this trigger. Be active!  Ask your doctor about taking medicine before sports or exercise to prevent symptoms.    Warm up for 5-10 minutes before and after sports or exercise.     Other Triggers of Asthma  Cold air:  Cover your nose and mouth with a scarf.  Sometimes laughing or crying can be a trigger.  Some medicines and food can trigger asthma.

## 2022-06-27 NOTE — PROGRESS NOTES
SUBJECTIVE:   CC: Trenton Milian is an 61 year old woman who presents for preventive health visit.       Patient has been advised of split billing requirements and indicates understanding: Yes  Patient is new to me.  Presents for annual physical.  Also out change in body shape, weight gain, dry skin, thinning of hair.  And overall general fatigue.  Of note she has been eating fewer calories.  She exercises regularly and has throughout her life.  She did quit smoking back in January.  She is doing very well with that.  She is wondering if possibly she is taking in too few calories or if something else is a cause to the body shape changes to include increase in breast size bilaterally, and decreased energy.  Medications were reviewed.  There is been no recent changes.  Denies any shortness of breath, chest pain, nausea or vomiting, or changes to urinary or bowel habits.  However she has been having constipation along with a dry skin, as well as mild swelling in her hands and feet.  She is able to notices because her rings and watch will be tighter despite not changing in size in the morning.  Usually goes down throughout the day.    Healthy Habits:     Getting at least 3 servings of Calcium per day:  Yes    Bi-annual eye exam:  Yes    Dental care twice a year:  Yes    Sleep apnea or symptoms of sleep apnea:  None    Diet:  Regular (no restrictions)    Frequency of exercise:  6-7 days/week    Duration of exercise:  45-60 minutes    Taking medications regularly:  Yes    Barriers to taking medications:  None    Medication side effects:  None    PHQ-2 Total Score: 2    Additional concerns today:  No            Today's PHQ-2 Score:   PHQ-2 ( 1999 [employer]) 6/27/2022   Q1: Little interest or pleasure in doing things 1   Q2: Feeling down, depressed or hopeless 1   PHQ-2 Score 2   PHQ-2 Total Score (12-17 Years)- Positive if 3 or more points; Administer PHQ-A if positive -   Q1: Little interest or pleasure in doing things  Several days   Q2: Feeling down, depressed or hopeless Several days   PHQ-2 Score 2       Abuse: Current or Past (Physical, Sexual or Emotional) - No  Do you feel safe in your environment? Yes        Social History     Tobacco Use     Smoking status: Former Smoker     Packs/day: 0.50     Years: 40.00     Pack years: 20.00     Types: Cigarettes     Quit date: 2022     Years since quittin.4     Smokeless tobacco: Never Used   Substance Use Topics     Alcohol use: Yes     Comment: Occasional       Alcohol Use 2022   Prescreen: >3 drinks/day or >7 drinks/week? No   Prescreen: >3 drinks/day or >7 drinks/week? -     Reviewed orders with patient.  Reviewed health maintenance and updated orders accordingly - Yes  Lab work is in process    Breast Cancer Screening:    FHS-7:   Breast CA Risk Assessment (FHS-7) 2022   Did any of your first-degree relatives have breast or ovarian cancer? Yes   Did any of your relatives have bilateral breast cancer? No   Did any man in your family have breast cancer? No   Did any woman in your family have breast and ovarian cancer? Yes   Did any woman in your family have breast cancer before age 50 y? No   Do you have 2 or more relatives with breast and/or ovarian cancer? Yes   Do you have 2 or more relatives with breast and/or bowel cancer? Yes     Mammogram Screening: Recommended mammography every 1-2 years with patient discussion and risk factor consideration  Pertinent mammograms are reviewed under the imaging tab.    History of abnormal Pap smear: Status post benign hysterectomy. Health Maintenance and Surgical History updated.     Reviewed and updated as needed this visit by clinical staff   Tobacco  Allergies  Meds  Problems  Med Hx  Surg Hx  Fam Hx  Soc   Hx          Reviewed and updated as needed this visit by Provider   Tobacco  Allergies  Meds  Problems  Med Hx  Surg Hx  Fam Hx             Review of Systems   Constitutional: Negative for chills and  "fever.   HENT: Negative for congestion, ear pain, hearing loss and sore throat.    Eyes: Negative for pain and visual disturbance.   Respiratory: Negative for cough and shortness of breath.    Cardiovascular: Negative for chest pain, palpitations and peripheral edema.   Gastrointestinal: Positive for abdominal pain and constipation. Negative for diarrhea, heartburn, hematochezia and nausea.   Breasts:  Negative for tenderness, breast mass and discharge.   Genitourinary: Negative for dysuria, frequency, genital sores, hematuria, pelvic pain, urgency, vaginal bleeding and vaginal discharge.   Musculoskeletal: Positive for joint swelling. Negative for arthralgias and myalgias.   Skin: Negative for rash.   Neurological: Negative for dizziness, weakness, headaches and paresthesias.   Psychiatric/Behavioral: Negative for mood changes. The patient is not nervous/anxious.         OBJECTIVE:   /62 (BP Location: Left arm, Patient Position: Sitting, Cuff Size: Adult Large)   Pulse 64   Temp 98.9  F (37.2  C) (Oral)   Resp 12   Ht 1.613 m (5' 3.5\")   Wt 66.6 kg (146 lb 14.4 oz)   LMP  (LMP Unknown)   Breastfeeding No   BMI 25.61 kg/m    Physical Exam  Vitals and nursing note reviewed.   Constitutional:       General: She is not in acute distress.     Appearance: Normal appearance.   HENT:      Head: Normocephalic and atraumatic.      Right Ear: Tympanic membrane, ear canal and external ear normal.      Left Ear: Tympanic membrane, ear canal and external ear normal.      Nose: Nose normal.      Mouth/Throat:      Mouth: Mucous membranes are moist.      Pharynx: Oropharynx is clear. No oropharyngeal exudate.   Eyes:      General: No scleral icterus.     Extraocular Movements: Extraocular movements intact.      Conjunctiva/sclera: Conjunctivae normal.   Neck:      Vascular: No carotid bruit.   Cardiovascular:      Rate and Rhythm: Normal rate and regular rhythm.      Pulses: Normal pulses.      Heart sounds: Normal " heart sounds. No murmur heard.    No friction rub. No gallop.   Pulmonary:      Effort: Pulmonary effort is normal.      Breath sounds: Normal breath sounds. No wheezing, rhonchi or rales.   Musculoskeletal:         General: No swelling. Normal range of motion.      Cervical back: Normal range of motion.      Right lower leg: No edema.      Left lower leg: No edema.   Skin:     General: Skin is warm and dry.      Capillary Refill: Capillary refill takes less than 2 seconds.      Findings: No rash.   Neurological:      General: No focal deficit present.      Mental Status: She is alert and oriented to person, place, and time.      Cranial Nerves: No cranial nerve deficit.      Gait: Gait is intact. Gait normal.      Deep Tendon Reflexes: Reflexes normal.      Reflex Scores:       Bicep reflexes are 2+ on the right side and 2+ on the left side.       Patellar reflexes are 2+ on the right side and 2+ on the left side.  Psychiatric:         Mood and Affect: Mood normal.         Thought Content: Thought content normal.         ASSESSMENT/PLAN:     Problem List Items Addressed This Visit        Medium    History of recurrent deep vein thrombosis (DVT)     On chronic anticoagulation using Xarelto.  We will continue.           Generalized edema     Mild but present.  Given that as well as the skin changes and hair changes will check thyroid as well as CBC.  Given her history of underlying iron deficiency as well as elevated MCV, will check CBC along with B12/folate and ferritin levels.           Relevant Orders    TSH with free T4 reflex    Basic metabolic panel  (Ca, Cl, CO2, Creat, Gluc, K, Na, BUN)    Weight gain     See treatment plan for generalized edema           Relevant Orders    TSH with free T4 reflex    Basic metabolic panel  (Ca, Cl, CO2, Creat, Gluc, K, Na, BUN)    Mild intermittent asthma without complication     Very well controlled with occasional use of albuterol.  We will continue current plan            "  Other Visit Diagnoses     Routine general medical examination at a health care facility    -  Primary    Cervical pain (neck)        Relevant Medications    diazepam (VALIUM) 10 MG tablet    Visit for screening mammogram        Relevant Orders    MA SCREENING DIGITAL BILAT - Future  (s+30)    Elevated MCV        Relevant Orders    Vitamin B12    Folate    CBC with Platelets and Reflex to Iron Studies (Completed)    Ferritin          Patient has been advised of split billing requirements and indicates understanding: Yes    COUNSELING:  Reviewed preventive health counseling, as reflected in patient instructions       Regular exercise       Healthy diet/nutrition       Alcohol Use       Advance Care Planning    Estimated body mass index is 25.61 kg/m  as calculated from the following:    Height as of this encounter: 1.613 m (5' 3.5\").    Weight as of this encounter: 66.6 kg (146 lb 14.4 oz).        She reports that she quit smoking about 5 months ago. Her smoking use included cigarettes. She has a 20.00 pack-year smoking history. She has never used smokeless tobacco.    Answers for HPI/ROS submitted by the patient on 6/27/2022  If you checked off any problems, how difficult have these problems made it for you to do your work, take care of things at home, or get along with other people?: Not difficult at all  PHQ9 TOTAL SCORE: 4      Counseling Resources:  ATP IV Guidelines  Pooled Cohorts Equation Calculator  Breast Cancer Risk Calculator  BRCA-Related Cancer Risk Assessment: FHS-7 Tool  FRAX Risk Assessment  ICSI Preventive Guidelines  Dietary Guidelines for Americans, 2010  USDA's MyPlate  ASA Prophylaxis  Lung CA Screening    Rodrick Granados DO  [facility]  "

## 2022-07-18 ENCOUNTER — TRANSFERRED RECORDS (OUTPATIENT)
Dept: HEALTH INFORMATION MANAGEMENT | Facility: CLINIC | Age: 62
End: 2022-07-18

## 2022-07-22 ENCOUNTER — OFFICE VISIT (OUTPATIENT)
Dept: FAMILY MEDICINE | Facility: CLINIC | Age: 62
End: 2022-07-22
Payer: OTHER GOVERNMENT

## 2022-07-22 VITALS
TEMPERATURE: 98.6 F | DIASTOLIC BLOOD PRESSURE: 70 MMHG | HEART RATE: 85 BPM | RESPIRATION RATE: 18 BRPM | SYSTOLIC BLOOD PRESSURE: 106 MMHG | OXYGEN SATURATION: 96 %

## 2022-07-22 DIAGNOSIS — J01.00 ACUTE MAXILLARY SINUSITIS, RECURRENCE NOT SPECIFIED: Primary | ICD-10-CM

## 2022-07-22 PROCEDURE — 99213 OFFICE O/P EST LOW 20 MIN: CPT | Performed by: PHYSICIAN ASSISTANT

## 2022-07-22 RX ORDER — DOXYCYCLINE 100 MG/1
100 TABLET ORAL 2 TIMES DAILY
Qty: 20 TABLET | Refills: 0 | Status: SHIPPED | OUTPATIENT
Start: 2022-07-22 | End: 2022-08-01

## 2022-07-22 RX ORDER — ACETAMINOPHEN 500 MG
500-1000 TABLET ORAL EVERY 6 HOURS PRN
COMMUNITY
End: 2022-10-07

## 2022-07-22 NOTE — PROGRESS NOTES
Assessment & Plan     Acute maxillary sinusitis, recurrence not specified  abx as ordered.   Push fluids, rest and ibuprofen or tylenol for comfort.    RTC for persistent or worsening sx.     - doxycycline monohydrate (ADOXA) 100 MG tablet  Dispense: 20 tablet; Refill: 0       YOK[facility] Agarwal [facility]    Priscilla Chowdhury is a 62 year old female who presents to clinic today for the following health issues:  Chief Complaint   Patient presents with     Sinus Problem     6 days      Nasal Congestion     Cough     Only at night     Eye Problem     crusty     Headache     Covid 19 Testing     Hometest is negative 07/20/22     HPI  Pt presents to urgent care with concerns re: sinus infection.    1 week hx of uri sx but last night worsening facial pain, HA, R eye became red and weepy.    PND, cough at night only.  No daytime cough.    Yellow mucus in the nose , R eye redness, discharge, HA,   Productive yellow sputum, bloody and thick discolored nasal discharge.    Tooth pain, R side.    Chronic sinusitis with previous sinus surgeries.  Feels like prior sinus infections.    Fatigue.    General malaise  no fevers.    No sob, difficulty breathing.    Congestion in the chest supine.    Day 4 home covid 19      Review of Systems  Constitutional, HEENT, cardiovascular, pulmonary, gi and gu systems are negative, except as otherwise noted.      Objective    /70   Pulse 85   Temp 98.6  F (37  C)   Resp 18   LMP  (LMP Unknown)   SpO2 96%   Breastfeeding No   Physical Exam   Pt is in no acute distress and appears well  Ears patent B:  TM s intact, non-injected. All land marks easily visibile    Nasal mucosa is edematous, mucopurulent discharge.    Pharynx: non erythematous, tonsils non hypertrophied, No exudate   Neck supple: no adenopathy  Lungs: CTA  Heart: RRR, no murmur, no thrills or heaves   Ext: no edema  Skin: no rashes    R eye mild injection, no current  discharge.

## 2022-07-25 ENCOUNTER — E-VISIT (OUTPATIENT)
Dept: URGENT CARE | Facility: URGENT CARE | Age: 62
End: 2022-07-25
Payer: OTHER GOVERNMENT

## 2022-07-25 ENCOUNTER — OFFICE VISIT (OUTPATIENT)
Dept: FAMILY MEDICINE | Facility: CLINIC | Age: 62
End: 2022-07-25

## 2022-07-25 VITALS
SYSTOLIC BLOOD PRESSURE: 104 MMHG | DIASTOLIC BLOOD PRESSURE: 68 MMHG | OXYGEN SATURATION: 94 % | BODY MASS INDEX: 25.46 KG/M2 | HEART RATE: 81 BPM | TEMPERATURE: 98.3 F | WEIGHT: 146 LBS

## 2022-07-25 DIAGNOSIS — J06.9 VIRAL UPPER RESPIRATORY INFECTION: Primary | ICD-10-CM

## 2022-07-25 DIAGNOSIS — J01.00 ACUTE NON-RECURRENT MAXILLARY SINUSITIS: Primary | ICD-10-CM

## 2022-07-25 DIAGNOSIS — K29.50 CHRONIC GASTRITIS WITHOUT BLEEDING, UNSPECIFIED GASTRITIS TYPE: ICD-10-CM

## 2022-07-25 PROCEDURE — 99207 PR NO CHARGE LOS: CPT | Performed by: PHYSICIAN ASSISTANT

## 2022-07-25 PROCEDURE — 99213 OFFICE O/P EST LOW 20 MIN: CPT | Performed by: PHYSICIAN ASSISTANT

## 2022-07-25 RX ORDER — PREDNISONE 20 MG/1
40 TABLET ORAL DAILY
Qty: 10 TABLET | Refills: 0 | Status: SHIPPED | OUTPATIENT
Start: 2022-07-25 | End: 2022-07-30

## 2022-07-25 NOTE — PROGRESS NOTES
Chief Complaint   Patient presents with     Sinus Problem     Was seen on 7/22 for sinus infection given Doxycyline  not getting better        ASSESSMENT/PLAN:  Trenton was seen today for sinus problem.    Diagnoses and all orders for this visit:    Acute non-recurrent maxillary sinusitis  -     predniSONE (DELTASONE) 20 MG tablet; Take 2 tablets (40 mg) by mouth daily for 5 days    Several COVID test negative.  Seems less likely to be this.  Could be a viral sinus infection.  Recommend continuing doxycycline since this should provide good coverage for bacterial cause.  Also adding prednisone which may improve symptoms.  Discussed that if it is viral it may take 2 to 3 weeks for symptom resolution.    Jovanni Lewis PA-C      SUBJECTIVE:  Trenton is a 62 year old female who presents to urgent care with 9 days of sinus pain, pressure, teeth pain, fatigue and malaise.  Has some nasal congestion.  Does have a history of sinus infections that have all but disappeared after having sinus surgery.  She has tested herself 3 different times including this morning for COVID which were all negative.  3 days ago she was placed on doxycycline.  Her symptoms have not improved.    ROS: Pertinent ROS neg other than the symptoms noted above in the HPI.     OBJECTIVE:  /68   Pulse 81   Temp 98.3  F (36.8  C) (Oral)   Wt 66.2 kg (146 lb)   LMP  (LMP Unknown)   SpO2 94%   BMI 25.46 kg/m     GENERAL: healthy, alert and no distress  EYES: Eyes grossly normal to inspection, PERRL and conjunctivae and sclerae normal  HENT: Patent oropharynx without any significant erythema, maxillary sinus tenderness bilaterally, no significant frontal tenderness.  Nasal mucosa erythematous with some discharge noted  NECK: no adenopathy nontender    DIAGNOSTICS    No results found for any visits on 07/25/22.     Current Outpatient Medications   Medication     acetaminophen (TYLENOL) 500 MG tablet     CERAVE Crea cream     cetirizine (ZYRTEC)  10 MG tablet     diazepam (VALIUM) 10 MG tablet     doxycycline monohydrate (ADOXA) 100 MG tablet     omeprazole (PRILOSEC) 20 MG DR capsule     PROAIR HFA 90 mcg/actuation inhaler     sertraline (ZOLOFT) 25 MG tablet     triamcinolone (KENALOG) 0.1 % external cream     XARELTO ANTICOAGULANT 20 MG TABS tablet     calcium carbonate-vitamin D3 (CALTRATE 600 PLUS D3) 600 mg(1,500mg) -400 unit per tablet     VOLTAREN 1 % Gel     No current facility-administered medications for this visit.      Patient Active Problem List   Diagnosis     History of recurrent deep vein thrombosis (DVT)     Lupus anticoagulant disorder (H)     Esophageal stricture     Anxiety disorder, unspecified     History of gastric ulcer     Hyperlipidemia, unspecified     Generalized edema     Weight gain     Mild intermittent asthma without complication      Past Medical History:   Diagnosis Date     Asthma      Depression      Environmental allergies      Esophageal stricture 09/15/2014    EGD done at [facility].     Gastric ulcer 09/15/2014    EGD done at [facility].     GERD (gastroesophageal reflux disease)      Left leg DVT (H)      Lupus (H)      Past Surgical History:   Procedure Laterality Date     ABDOMINOPLASTY       APPENDECTOMY        SECTION       ESOPHAGOSCOPY, GASTROSCOPY, DUODENOSCOPY (EGD), COMBINED  09/15/2014    Esophageal stricture and gastric ulceration.     EXCISION / CURETTAGE BONE CYST PHALANGES OF FOOT Left      HYSTERECTOMY TOTAL ABDOMINAL, BILATERAL SALPINGO-OOPHORECTOMY, COMBINED      for ovarian infection.     MANDIBLE SURGERY  2007    lower jaw advancement. Left over numbness of tongue and gums.Totally 5 surgeries.     OVARIAN CYST SURGERY Right 1977     SINUS SURGERY Bilateral      Family History   Problem Relation Age of Onset     Hypertension Mother      Thyroid Disease Mother      Arthritis Mother      Breast Cancer Mother 85        breast cancer      Hypertension Father      Heart Disease Father         Valve replacement ,bypass     Arthritis Father      Breast Cancer Maternal Grandmother      Other - See Comments Maternal Grandmother         some sort blood clot.     Anxiety Disorder Brother      Obesity Brother      Rectal Cancer Brother      Depression Brother      Gout Son      Gout Son      Breast Cancer Paternal Aunt      Social History     Tobacco Use     Smoking status: Former Smoker     Packs/day: 0.50     Years: 40.00     Pack years: 20.00     Types: Cigarettes     Quit date: 2022     Years since quittin.5     Smokeless tobacco: Never Used   Substance Use Topics     Alcohol use: Yes     Comment: Occasional              The plan of care was discussed with the patient. They understand and agree with the course of treatment prescribed. A printed summary was given including instructions and medications.  The use of Dragon/MileWise dictation services may have been used to construct the content in this note; any grammatical or spelling errors are non-intentional. Please contact the author of this note directly if you are in need of any clarification.

## 2022-07-25 NOTE — TELEPHONE ENCOUNTER
"Last Written Prescription Date:  9/16/21  Last Fill Quantity: 90,  # refills: 2   Last office visit provider:  6/27/22     Requested Prescriptions   Pending Prescriptions Disp Refills     omeprazole (PRILOSEC) 20 MG DR capsule [Pharmacy Med Name: OMEPRAZOLE DR CAPS 20MG] 90 capsule 3     Sig: TAKE 1 CAPSULE DAILY BEFORE BREAKFAST       PPI Protocol Passed - 7/25/2022 11:49 AM        Passed - Not on Clopidogrel (unless Pantoprazole ordered)        Passed - No diagnosis of osteoporosis on record        Passed - Recent (12 mo) or future (30 days) visit within the authorizing provider's specialty     Patient has had an office visit with the authorizing provider or a provider within the authorizing providers department within the previous 12 mos or has a future within next 30 days. See \"Patient Info\" tab in inbasket, or \"Choose Columns\" in Meds & Orders section of the refill encounter.              Passed - Medication is active on med list        Passed - Patient is age 18 or older        Passed - No active pregnacy on record        Passed - No positive pregnancy test in past 12 months             Juan Thurston RN 07/25/22 11:49 AM  "

## 2022-07-25 NOTE — PATIENT INSTRUCTIONS
Dear Trenton Milian,    We are sorry you are not feeling well. Based on the responses you provided, it is recommended that you be seen in-person in urgent care so we can better evaluate your symptoms. Please click here to find the nearest urgent care location to you. Thank you for trusting us with your care.    Dari Nuno PA-C

## 2022-07-26 ENCOUNTER — HOSPITAL ENCOUNTER (OUTPATIENT)
Dept: MAMMOGRAPHY | Facility: CLINIC | Age: 62
Discharge: HOME | End: 2022-07-26
Attending: FAMILY MEDICINE | Admitting: FAMILY MEDICINE
Payer: OTHER GOVERNMENT

## 2022-07-26 DIAGNOSIS — Z12.31 VISIT FOR SCREENING MAMMOGRAM: ICD-10-CM

## 2022-07-26 PROCEDURE — 77067 SCR MAMMO BI INCL CAD: CPT

## 2022-09-06 ENCOUNTER — TRANSFERRED RECORDS (OUTPATIENT)
Dept: HEALTH INFORMATION MANAGEMENT | Facility: CLINIC | Age: 62
End: 2022-09-06

## 2022-09-24 ENCOUNTER — HEALTH MAINTENANCE LETTER (OUTPATIENT)
Age: 62
End: 2022-09-24

## 2022-10-04 ASSESSMENT — ASTHMA QUESTIONNAIRES
QUESTION_5 LAST FOUR WEEKS HOW WOULD YOU RATE YOUR ASTHMA CONTROL: COMPLETELY CONTROLLED
ACT_TOTALSCORE: 25
QUESTION_3 LAST FOUR WEEKS HOW OFTEN DID YOUR ASTHMA SYMPTOMS (WHEEZING, COUGHING, SHORTNESS OF BREATH, CHEST TIGHTNESS OR PAIN) WAKE YOU UP AT NIGHT OR EARLIER THAN USUAL IN THE MORNING: NOT AT ALL
QUESTION_1 LAST FOUR WEEKS HOW MUCH OF THE TIME DID YOUR ASTHMA KEEP YOU FROM GETTING AS MUCH DONE AT WORK, SCHOOL OR AT HOME: NONE OF THE TIME
ACT_TOTALSCORE: 25
QUESTION_4 LAST FOUR WEEKS HOW OFTEN HAVE YOU USED YOUR RESCUE INHALER OR NEBULIZER MEDICATION (SUCH AS ALBUTEROL): NOT AT ALL
QUESTION_2 LAST FOUR WEEKS HOW OFTEN HAVE YOU HAD SHORTNESS OF BREATH: NOT AT ALL

## 2022-10-06 RX ORDER — METHYLPREDNISOLONE 4 MG
TABLET, DOSE PACK ORAL
COMMUNITY
Start: 2022-07-08 | End: 2022-10-07

## 2022-10-06 RX ORDER — GABAPENTIN 300 MG/1
CAPSULE ORAL
COMMUNITY
Start: 2022-07-18 | End: 2023-09-03

## 2022-10-07 ENCOUNTER — OFFICE VISIT (OUTPATIENT)
Dept: FAMILY MEDICINE | Facility: CLINIC | Age: 62
End: 2022-10-07
Payer: OTHER GOVERNMENT

## 2022-10-07 VITALS
WEIGHT: 156 LBS | SYSTOLIC BLOOD PRESSURE: 112 MMHG | RESPIRATION RATE: 18 BRPM | BODY MASS INDEX: 26.63 KG/M2 | HEART RATE: 75 BPM | OXYGEN SATURATION: 97 % | HEIGHT: 64 IN | DIASTOLIC BLOOD PRESSURE: 66 MMHG | TEMPERATURE: 98.8 F

## 2022-10-07 DIAGNOSIS — D68.62 LUPUS ANTICOAGULANT DISORDER (H): ICD-10-CM

## 2022-10-07 DIAGNOSIS — Z01.818 PREOP GENERAL PHYSICAL EXAM: Primary | ICD-10-CM

## 2022-10-07 DIAGNOSIS — M54.2 CERVICAL PAIN (NECK): ICD-10-CM

## 2022-10-07 DIAGNOSIS — R63.5 WEIGHT GAIN: ICD-10-CM

## 2022-10-07 DIAGNOSIS — R73.9 HYPERGLYCEMIA: ICD-10-CM

## 2022-10-07 DIAGNOSIS — J45.20 MILD INTERMITTENT ASTHMA WITHOUT COMPLICATION: ICD-10-CM

## 2022-10-07 DIAGNOSIS — Z86.718 HISTORY OF RECURRENT DEEP VEIN THROMBOSIS (DVT): ICD-10-CM

## 2022-10-07 PROBLEM — M89.30 HYPERTROPHY OF BONE: Status: ACTIVE | Noted: 2022-10-07

## 2022-10-07 PROBLEM — I45.9 CARDIAC CONDUCTION DISORDER: Status: ACTIVE | Noted: 2022-10-07

## 2022-10-07 PROBLEM — M25.519 ARTHRALGIA OF SHOULDER: Status: ACTIVE | Noted: 2022-10-07

## 2022-10-07 PROBLEM — R73.03 PREDIABETES: Status: ACTIVE | Noted: 2022-10-07

## 2022-10-07 PROBLEM — N95.1 MENOPAUSAL SYMPTOM: Status: ACTIVE | Noted: 2022-10-07

## 2022-10-07 PROBLEM — E53.8 VITAMIN B12 DEFICIENCY: Status: ACTIVE | Noted: 2022-10-07

## 2022-10-07 LAB
ERYTHROCYTE [DISTWIDTH] IN BLOOD BY AUTOMATED COUNT: 12.9 % (ref 10–15)
HBA1C MFR BLD: 5.7 % (ref 0–5.6)
HCT VFR BLD AUTO: 43.3 % (ref 35–47)
HGB BLD-MCNC: 14.3 G/DL (ref 11.7–15.7)
HOLD SPECIMEN: NORMAL
HOLD SPECIMEN: NORMAL
MCH RBC QN AUTO: 33.1 PG (ref 26.5–33)
MCHC RBC AUTO-ENTMCNC: 33 G/DL (ref 31.5–36.5)
MCV RBC AUTO: 100 FL (ref 78–100)
PLATELET # BLD AUTO: 260 10E3/UL (ref 150–450)
RBC # BLD AUTO: 4.32 10E6/UL (ref 3.8–5.2)
TSH SERPL DL<=0.005 MIU/L-ACNC: 0.72 UIU/ML (ref 0.3–4.2)
WBC # BLD AUTO: 9.4 10E3/UL (ref 4–11)

## 2022-10-07 PROCEDURE — 36415 COLL VENOUS BLD VENIPUNCTURE: CPT | Performed by: FAMILY MEDICINE

## 2022-10-07 PROCEDURE — 84443 ASSAY THYROID STIM HORMONE: CPT | Performed by: FAMILY MEDICINE

## 2022-10-07 PROCEDURE — 99214 OFFICE O/P EST MOD 30 MIN: CPT | Performed by: FAMILY MEDICINE

## 2022-10-07 PROCEDURE — 85027 COMPLETE CBC AUTOMATED: CPT | Performed by: FAMILY MEDICINE

## 2022-10-07 PROCEDURE — 83036 HEMOGLOBIN GLYCOSYLATED A1C: CPT | Performed by: FAMILY MEDICINE

## 2022-10-07 RX ORDER — ALBUTEROL SULFATE 90 UG/1
2 AEROSOL, METERED RESPIRATORY (INHALATION) EVERY 4 HOURS PRN
Qty: 18 G | Refills: 3 | Status: SHIPPED | OUTPATIENT
Start: 2022-10-07

## 2022-10-07 RX ORDER — ALBUTEROL SULFATE 90 UG/1
AEROSOL, METERED RESPIRATORY (INHALATION)
Qty: 18 G | Refills: 3 | Status: CANCELLED | OUTPATIENT
Start: 2022-10-07

## 2022-10-07 RX ORDER — DIAZEPAM 10 MG
TABLET ORAL
Qty: 15 TABLET | Refills: 0 | Status: CANCELLED | OUTPATIENT
Start: 2022-10-07

## 2022-10-07 RX ORDER — DIAZEPAM 10 MG
TABLET ORAL
Qty: 15 TABLET | Refills: 0 | Status: SHIPPED | OUTPATIENT
Start: 2022-10-07 | End: 2023-01-20

## 2022-10-07 ASSESSMENT — PAIN SCALES - GENERAL: PAINLEVEL: NO PAIN (0)

## 2022-10-07 NOTE — PROGRESS NOTES
[facility]  [address] [place] [address] [address] [facility] [address]  [facility] [address]  Phone: [phone]  Fax: [phone]  Primary Provider: Rodrick Granados  Pre-op Performing Provider: ROBYN VAN      PREOPERATIVE EVALUATION:  Today's date: 10/7/2022    Trenton Milian is a 62 year old female who presents for a preoperative evaluation.    Surgical Information:  Surgery/Procedure: left thumb repair, spur in left thumb.  Surgery Location: [facility]  Surgeon: Dr Patterson  Surgery Date: 10/17/22  Time of Surgery: tbd  Where patient plans to recover: At home with family  Fax number for surgical facility: [phone]    Type of Anesthesia Anticipated: to be determined    Assessment & Plan     The proposed surgical procedure is considered INTERMEDIATE risk.    Trenton was seen today for pre-op exam and refill request.    Diagnoses and all orders for this visit:    Preop general physical exam  -     CBC with platelets  -     Extra Tube; Future  -     Extra Tube    Cervical pain (neck)  -     diazepam (VALIUM) 10 MG tablet; [DIAZEPAM (VALIUM) 10 MG TABLET] TAKE 1/2 TO 1 TABLET(5 TO 10 MG) BY MOUTH AT BEDTIME AS NEEDED    Mild intermittent asthma without complication  -     PROAIR  (90 Base) MCG/ACT inhaler; Inhale 2 puffs into the lungs every 4 hours as needed for shortness of breath / dyspnea or wheezing    History of recurrent deep vein thrombosis (DVT)  -     Med Therapy Management Referral    Lupus anticoagulant disorder (H)  -     Med Therapy Management Referral    Hyperglycemia  -     Hemoglobin A1c    Weight gain  -     TSH with free T4 reflex    Other orders  -     Extra Tube; Future  -     Extra Tube           Risks and Recommendations:  The patient has the following additional risks and recommendations for perioperative complications:   - No identified additional risk factors other than previously addressed    Medication Instructions:  Patient is to take all scheduled medications  on the day of surgery EXCEPT for modifications listed below:     Hold Xarelto 3 days prior to the procedure.  No need for bridging.  3-day duration confirmed with Dr. Patterson and discussed with anticoagulation [facility] pharmacy also discussed with patient.          RECOMMENDATION:  APPROVAL GIVEN to proceed with proposed procedure, without further diagnostic evaluation.    Subjective     HPI related to upcoming procedure:     She had a fall and sustained an injury to her left thumb that now requires surgical treatment as she is exhausted more conservative means for management.    She has a history of lupus anticoagulant leading to recurrent DVT.  She is currently on a Xarelto.  She has not been giving any definitive advice regarding anticoagulation management by her surgeon at this point in time.  We discussed referral to our [facility] pharmacy team on an urgent basis to help with planning for anticoagulation management with her procedure.  We will have her contact her surgeon to determine their requirements regarding anticoagulation for the intended procedure.  These recommendations will need to be coordinated in the very near future.  She has no signs or symptoms of DVT or pulmonary embolus currently.  She has no excessive bleeding concerns.    She has intermittent asthma.  She fortunately quit smoking.  Reports rare use of rescue inhaler.    She has acid reflux reports control of symptoms.    She reports that sertraline has worked well to help maintain mental health she reports no current concerns.    She takes diazepam due to a muscle pain issue due to spasm in the jaw following a surgery.  She needs a refill.  She is on gabapentin.    She has restless leg syndrome.  She has had iron infusions done by her neurologist.    She was reported to have hyperglycemia on a recent reading by her neurologist.  An A1c was obtained today which was 5.7.  Discussed that this is indicative of mild prediabetes.  Discussed ways of managing  blood sugar do not feel this would be a significant concern with upcoming procedure.  Preop Questions 10/4/2022   1. Have you ever had a heart attack or stroke? No   2. Have you ever had surgery on your heart or blood vessels, such as a stent placement, a coronary artery bypass, or surgery on an artery in your head, neck, heart, or legs? No   3. Do you have chest pain with activity? No   4. Do you have a history of  heart failure? No   5. Do you currently have a cold, bronchitis or symptoms of other infection? No   6. Do you have a cough, shortness of breath, or wheezing? No   7. Do you or anyone in your family have previous history of blood clots? YES -    8. Do you or does anyone in your family have a serious bleeding problem such as prolonged bleeding following surgeries or cuts? No   9. Have you ever had problems with anemia or been told to take iron pills? YES -    10. Have you had any abnormal blood loss such as black, tarry or bloody stools, or abnormal vaginal bleeding? No   11. Have you ever had a blood transfusion? No   12. Are you willing to have a blood transfusion if it is medically needed before, during, or after your surgery? Yes   13. Have you or any of your relatives ever had problems with anesthesia? No   14. Do you have sleep apnea, excessive snoring or daytime drowsiness? No   15. Do you have any artifical heart valves or other implanted medical devices like a pacemaker, defibrillator, or continuous glucose monitor? No   16. Do you have artificial joints? No   17. Are you allergic to latex? No       Health Care Directive:  Patient does not have a Health Care Directive or Living Will:     Preoperative Review of :   reviewed - controlled substances reflected in medication list.      Status of Chronic Conditions:  See problem list for active medical problems.  Problems all longstanding and stable, except as noted/documented.  See ROS for pertinent symptoms related to these  conditions.      Review of Systems  Complete review of systems is obtained.  Other than the specific considerations noted above complete review of systems is negative.      Patient Active Problem List    Diagnosis Date Noted     Vitamin B12 deficiency 10/07/2022     Priority: Medium     Prediabetes 10/07/2022     Priority: Medium     Menopausal symptom 10/07/2022     Priority: Medium     Premarin 0.3 mg QAM       Hypertrophy of bone 10/07/2022     Priority: Medium     advise on removal vs tx via steroid injections  w/c- will put into S-3       Cardiac conduction disorder 10/07/2022     Priority: Medium     Arthralgia of shoulder 10/07/2022     Priority: Medium     History of gastric ulcer 2022     Priority: Medium     Generalized edema 2022     Priority: Medium     Weight gain 2022     Priority: Medium     Mild intermittent asthma without complication 2022     Priority: Medium     Lupus anticoagulant disorder (H) 09/10/2019     Priority: Medium     Esophageal stricture 09/10/2019     Priority: Medium     History of recurrent deep vein thrombosis (DVT)      Priority: Medium     Anxiety disorder, unspecified 2017     Priority: Medium     Hyperlipidemia, unspecified 2016     Priority: Medium      Past Medical History:   Diagnosis Date     Asthma      Cardiac conduction disorder 10/7/2022     Depression      Environmental allergies      Esophageal stricture 09/15/2014    EGD done at [facility].     Gastric ulcer 09/15/2014    EGD done at [facility].     GERD (gastroesophageal reflux disease)      Left leg DVT (H)      Lupus (H)      Past Surgical History:   Procedure Laterality Date     ABDOMINOPLASTY       APPENDECTOMY        SECTION       ESOPHAGOSCOPY, GASTROSCOPY, DUODENOSCOPY (EGD), COMBINED  09/15/2014    Esophageal stricture and gastric ulceration.     EXCISION / CURETTAGE BONE CYST PHALANGES OF FOOT Left      HYSTERECTOMY TOTAL  ABDOMINAL, BILATERAL SALPINGO-OOPHORECTOMY, COMBINED      for ovarian infection.     MANDIBLE SURGERY  2007    lower jaw advancement. Left over numbness of tongue and gums.Totally 5 surgeries.     OVARIAN CYST SURGERY Right 1977     SINUS SURGERY Bilateral      Current Outpatient Medications   Medication Sig Dispense Refill     CERAVE Crea cream [CERAVE CREA CREAM]        cetirizine (ZYRTEC) 10 MG tablet TAKE 1 TABLET DAILY 90 tablet 3     diazepam (VALIUM) 10 MG tablet [DIAZEPAM (VALIUM) 10 MG TABLET] TAKE 1/2 TO 1 TABLET(5 TO 10 MG) BY MOUTH AT BEDTIME AS NEEDED 15 tablet 0     gabapentin (NEURONTIN) 300 MG capsule        methylPREDNISolone (MEDROL DOSEPAK) 4 MG tablet therapy pack        omeprazole (PRILOSEC) 20 MG DR capsule TAKE 1 CAPSULE DAILY BEFORE BREAKFAST 90 capsule 3     PROAIR HFA 90 mcg/actuation inhaler [PROAIR HFA 90 MCG/ACTUATION INHALER]        sertraline (ZOLOFT) 25 MG tablet [SERTRALINE (ZOLOFT) 25 MG TABLET] Take 1 tablet (25 mg total) by mouth daily. 90 tablet 3     triamcinolone (KENALOG) 0.1 % external cream Apply topically 2 times daily 80 g 1     XARELTO ANTICOAGULANT 20 MG TABS tablet TAKE 1 TABLET DAILY 90 tablet 3       Allergies   Allergen Reactions     Adhesive Tape Itching     Augmentin Unknown     itchy     Clavulanic Acid      Clindamycin Unknown     Fentanyl Unknown     itchy     Hydroxyzine      Oxycodone      Penicillins Unknown     itchy     Septra [Sulfamethoxazole W/Trimethoprim] Unknown     itchy     Tramadol Unknown     itchy     Trimethoprim      Zyban [Bupropion] Unknown     Personality Change     Altex Rash     Cephalosporins Rash     Oxycodone-Acetaminophen Rash        Social History     Tobacco Use     Smoking status: Former Smoker     Packs/day: 0.50     Years: 40.00     Pack years: 20.00     Types: Cigarettes     Quit date: 2022     Years since quittin.7     Smokeless tobacco: Never Used   Substance Use Topics     Alcohol use: Yes     Comment:  "Occasional     Family History   Problem Relation Age of Onset     Hypertension Mother      Thyroid Disease Mother      Arthritis Mother      Breast Cancer Mother 85        breast cancer     Hypertension Father      Heart Disease Father         Valve replacement ,bypass     Arthritis Father      Breast Cancer Maternal Grandmother         70s     Other - See Comments Maternal Grandmother         some sort blood clot.     Anxiety Disorder Brother      Obesity Brother      Rectal Cancer Brother      Depression Brother      Gout Son      Gout Son      Breast Cancer Paternal Aunt         40-50     History   Drug Use Unknown         Objective     /66   Pulse 75   Temp 98.8  F (37.1  C)   Resp 18   Ht 1.613 m (5' 3.5\")   Wt 70.8 kg (156 lb)   LMP  (LMP Unknown)   SpO2 97%   BMI 27.20 kg/m      Wt Readings from Last 3 Encounters:   10/07/22 70.8 kg (156 lb)   07/25/22 66.2 kg (146 lb)   06/27/22 66.6 kg (146 lb 14.4 oz)        BP Readings from Last 6 Encounters:   10/07/22 112/66   07/25/22 104/68   07/22/22 106/70   06/27/22 108/62   06/12/22 119/76   01/27/22 109/73     Physical Exam        General Appearance:    Alert, cooperative, no distress   Eyes:   No scleral icterus or conjunctival irritation       Ears:    Normal TM's and external ear canals, both ears   Throat:   Lips, mucosa, and tongue normal; teeth and gums normal   Neck:   Supple, symmetrical, trachea midline, no adenopathy;        thyroid:  No enlargement/tenderness/nodules   Lungs:     Clear to auscultation bilaterally, respirations unlabored, no wheezes or crackles   Heart:    Regular rate and rhythm,  No murmur   Abdomen:    Soft, no distention   Extremities:  No edema, no joint swelling or redness, no evidence of any injuries   Skin:  No concerning skin findings, no suspicious moles, no rashes   Neurologic:  On gross examination there is no motor or sensory deficit.  Patient walks with a normal gait                 Recent Labs   Lab Test " 06/27/22  0830 10/20/21  1007   HGB 14.5 15.2    246    142   POTASSIUM 4.6 4.9   CR 0.79 0.82        Diagnostics:  Labs pending at this time.  Results will be reviewed when available.   No EKG required, no history of coronary heart disease, significant arrhythmia, peripheral arterial disease or other structural heart disease.     A stress test completed November 2021 was reviewed along with cardiology consultation.  Stress test was normal.  No indication for any additional cardiac testing at this time.  No indication for EKG.    Revised Cardiac Risk Index (RCRI):  The patient has the following serious cardiovascular risks for perioperative complications:   - No serious cardiac risks = 0 points     RCRI Interpretation: 0 points: Class I (very low risk - 0.4% complication rate)           Signed Electronically by: Pancho Mckeon MD, MD  Copy of this evaluation report is provided to requesting physician.

## 2022-10-10 ENCOUNTER — TELEPHONE (OUTPATIENT)
Dept: FAMILY MEDICINE | Facility: CLINIC | Age: 62
End: 2022-10-10

## 2022-10-10 ENCOUNTER — NURSE TRIAGE (OUTPATIENT)
Dept: NURSING | Facility: CLINIC | Age: 62
End: 2022-10-10

## 2022-10-10 NOTE — TELEPHONE ENCOUNTER
Spoke with a nurse.    They will check with Dr. Patterson's office about anticoagulation for Trenton's Xarelto and give us a call back.      sal

## 2022-10-10 NOTE — TELEPHONE ENCOUNTER
[facility] is returning a call to Dr. Mckeon. RN Tried to transfer caller to that clinic but they are closed.     Caller says Dr. Gilbert requires 3-4 days minimum off of Xerolto for procedure.  If any concerns, call [phone].    Routed to provider care team.    Vicki Hess RN  [facility]

## 2022-10-10 NOTE — TELEPHONE ENCOUNTER
DEANA-PROCEDURAL ANTICOAGULATION  MANAGEMENT    ASSESSMENT     Xarelto interruption plan for Left thumb Repair (spur in thumb) on 10/17/22.    Indication for Anticoagulation: Recurrent DVT and Lupus Anticoagulant  (9/19/2019)      On Rivaroxaban since 2017      Per 1/16/2018 Note DR. Ibarra ([facility]): Pt underwent lupus anticoagulant testing which came back positive twice, 12 weeks apart, confirming the diagnosis of APS  - Confirmed positive 2019 with Dr. Bejarano while holding Xarelto    - Noted hx of negative anticardiolipin antibodies      Recommended to be on concurrent aspirin      9/13/2019: Advised to hold without bridge by local hematology Dr. Bejarano     RECOMMENDATION       Trenton with CrCl 80 ml/min recommended time from last dose of Xarelto to surgery would be:     - 24 hours for low bleeding risk procedure    - 48 hours for  Intermediate/high bleeding risk procedure       - Chest 2022 Guidelines suggest NO BRIDGE for patients requiring DOAC interruption due to fast offset and fast onset with resumption of therapy          Post-Procedure:  ? Resume therapeeutic  Xarelto if okay with provider 24 hours post procedure (if delayed restart of  Therapeutic dose 20 mg daily is desired due to high bleeding risk procedure; suggest prophylaxis dose apixaban 10 mg daily for 24-48 hours then resume therapeutic 20 mg daily)    ? Consider follow up visit with hematology to confirm care plan with Xarelto-patients with triple positive antiphosphlipid syndrome are discouraged from use of Xarelto, however Trenton is only single positive so discussion with hematology suggested    Anabell Richards, [facility]    SUBJECTIVE/OBJECTIVE     Trenton Milian, a 62 year old female    Wt Readings from Last 3 Encounters:   10/07/22 70.8 kg (156 lb)   07/25/22 66.2 kg (146 lb)   06/27/22 66.6 kg (146 lb 14.4 oz)      Ideal body weight: 53.5 kg (118 lb 0.9 oz)  Adjusted ideal body weight: 60.4 kg (133 lb 3.7 oz)     Estimated  "body mass index is 27.2 kg/m  as calculated from the following:    Height as of 10/7/22: 1.613 m (5' 3.5\").    Weight as of 10/7/22: 70.8 kg (156 lb).    Lab Results   Component Value Date    CR 0.79 06/27/2022    CR 0.82 10/20/2021    CR 0.90 09/15/2020     CrCl: 80 ml/min using (Actual weight and creatinine 0.79)  "

## 2022-10-10 NOTE — TELEPHONE ENCOUNTER
Reason for Call:  Other Bridging orders    Detailed comments: Pt is calling back to inform Dr. Mckeon that the surgeon does want her to do bridging for her XARELTO ANTICOAGULANT 20 MG TABS tablet. Please review and inform patient bridging orders.     Pt states that if she does not answer to leave detailed message on her voicemail so she is aware of instructions.     Phone Number Patient can be reached at: Cell number on file:    Telephone Information:   Mobile [phone]       Best Time: anytime    Can we leave a detailed message on this number? YES    Call taken on 10/10/2022 at 10:52 AM by Chloe Thayer

## 2022-10-11 ENCOUNTER — TELEPHONE (OUTPATIENT)
Dept: FAMILY MEDICINE | Facility: CLINIC | Age: 62
End: 2022-10-11

## 2022-10-11 NOTE — TELEPHONE ENCOUNTER
Please call Dr. Patterson's care team back and ask the following -will they be okay with a more typical hold duration for the Xarelto of 24 hours for low risk bleeding procedure or 48 hours for moderate/high risk bleeding procedure?  If we can do the more typical hold duration this will simplify the process and cost for the patient significantly.  If they require 3 to 4-day hold we will plan to proceed with bridging using Lovenox.

## 2022-10-11 NOTE — TELEPHONE ENCOUNTER
MTM referral from: [place] clinic visit (referral by provider)    MTM referral outreach attempt #2 on October 11, 2022 at 9:40 AM      Outcome: Spoke with patient patient declined visit.    ELIZABETH Du

## 2022-10-12 NOTE — TELEPHONE ENCOUNTER
DEANA-PROCEDURAL ANTICOAGULATION  MANAGEMENT    Xarelto interruption plan for Left thumb Repair (spur in thumb) on 10/17/22.      Minimum 3 day hold per provider doing procedure; bridging requested          RECOMMENDATION       Suggest to proceed with 3 day hold if required without bridge (Hematology cleared for 4 day in 2019).     Note:     Xarelto Total duration subtherapeutic is 4 days due to therapeutic onset with first dose.      Lovenox leads to 1.5 days subtherapetuic     vs.     12-15 days on warfarin due to delayed return to therapeutic post procedure.      If bridging desired, suggested plan would be:    Hold  Xarelto starting Friday 10/14    Enoxaparin 70 mg Q 12 hrs     Start Friday 10/14 PM    Last dose Sunday 10/16 AM      Post procedure-resume Xarelto ~ 24 hours post procedure when okay with surgeon. No bridge since Lovenox needs same told time to prevent bleeding and Xarelto therapeutic with first dose.       Anabell Richards, [facility]

## 2022-10-12 NOTE — TELEPHONE ENCOUNTER
Pancho Mckeon MD  You 43 minutes ago (4:22 PM)     We will proceed with the 3 day hold without bridging.  I will discuss with patient, thank you again for helping to clarify the situation further.  I will also recommend as you stated previously to have her speak with a hematologist again about her current medication in the long run given her diagnosis.

## 2022-10-13 NOTE — TELEPHONE ENCOUNTER
Called and spoke with patient regarding recommendation to hold blood thinner for 3 days without bridging.  She is comfortable with proceeding in this fashion.  Also discussed with her briefly the recommendation to consider returning to hematology to look at long-term anticoagulation management given her diagnosis and considerations brought forth that Xarelto may not be ideal for her underlying diagnosis.  She will keep this in mind and we will consider hematology consultation in the future.

## 2022-10-13 NOTE — TELEPHONE ENCOUNTER
Dr Mckeon spoke with patient. Will re-send office notes. Patient has been contacted for instructions for anticoagulant prior and during surgery

## 2022-10-13 NOTE — TELEPHONE ENCOUNTER
I have addended her recent preop to reflect anticoagulation instructions.  Please resend a copy of the preop to [facility], no further action is necessary as I have already spoken with patient and made all other necessary arrangements.

## 2022-10-17 ENCOUNTER — TRANSFERRED RECORDS (OUTPATIENT)
Dept: HEALTH INFORMATION MANAGEMENT | Facility: CLINIC | Age: 62
End: 2022-10-17

## 2022-10-26 ENCOUNTER — TRANSFERRED RECORDS (OUTPATIENT)
Dept: HEALTH INFORMATION MANAGEMENT | Facility: CLINIC | Age: 62
End: 2022-10-26

## 2022-11-23 ENCOUNTER — TRANSFERRED RECORDS (OUTPATIENT)
Dept: HEALTH INFORMATION MANAGEMENT | Facility: CLINIC | Age: 62
End: 2022-11-23

## 2022-12-07 ENCOUNTER — TRANSFERRED RECORDS (OUTPATIENT)
Dept: HEALTH INFORMATION MANAGEMENT | Facility: CLINIC | Age: 62
End: 2022-12-07

## 2022-12-15 ENCOUNTER — ANCILLARY PROCEDURE (OUTPATIENT)
Dept: GENERAL RADIOLOGY | Facility: CLINIC | Age: 62
End: 2022-12-15
Attending: FAMILY MEDICINE
Payer: OTHER GOVERNMENT

## 2022-12-15 ENCOUNTER — OFFICE VISIT (OUTPATIENT)
Dept: FAMILY MEDICINE | Facility: CLINIC | Age: 62
End: 2022-12-15
Payer: OTHER GOVERNMENT

## 2022-12-15 VITALS
OXYGEN SATURATION: 96 % | RESPIRATION RATE: 16 BRPM | WEIGHT: 155 LBS | TEMPERATURE: 99.2 F | BODY MASS INDEX: 27.03 KG/M2 | SYSTOLIC BLOOD PRESSURE: 115 MMHG | DIASTOLIC BLOOD PRESSURE: 71 MMHG | HEART RATE: 106 BPM

## 2022-12-15 DIAGNOSIS — J06.9 VIRAL URI: Primary | ICD-10-CM

## 2022-12-15 DIAGNOSIS — R05.1 ACUTE COUGH: ICD-10-CM

## 2022-12-15 LAB
FLUAV AG SPEC QL IA: NEGATIVE
FLUBV AG SPEC QL IA: NEGATIVE

## 2022-12-15 PROCEDURE — 87804 INFLUENZA ASSAY W/OPTIC: CPT | Performed by: FAMILY MEDICINE

## 2022-12-15 PROCEDURE — 71046 X-RAY EXAM CHEST 2 VIEWS: CPT | Mod: TC | Performed by: RADIOLOGY

## 2022-12-15 PROCEDURE — 99213 OFFICE O/P EST LOW 20 MIN: CPT | Performed by: FAMILY MEDICINE

## 2022-12-15 NOTE — PROGRESS NOTES
Assessment:       Viral URI    Acute cough  - XR Chest 2 Views  - Influenza A & B Antigen - Clinic Collect         Plan:     Symptoms consistent with a viral upper respiratory infection.  Influenza negative.  Chest x-ray ordered and reviewed by myself showing no signs of infiltrate that I can appreciate.  Discussed the typical course of symptoms.  Noantibiotics indicated at this time.  Recommend symptomatic treatment such as decongestants and acetominephen or ibuprofen as needed.  Recommend follow up if getting worse or not improving.        Subjective:       62 year old female with a history of asthma and lupus presents for evaluation of a 2 to 3-day history of sore throat, ear pain, headache, nasal congestion, cough, and wheezing.  She coughs so hard she cannot catch her breath.  Her cough has been extremely productive of yellowish sputum.  She has had low-grade fevers and chills.    Patient Active Problem List   Diagnosis     History of recurrent deep vein thrombosis (DVT)     Lupus anticoagulant disorder (H)     Esophageal stricture     Anxiety disorder, unspecified     History of gastric ulcer     Hyperlipidemia, unspecified     Generalized edema     Weight gain     Mild intermittent asthma without complication     Vitamin B12 deficiency     Prediabetes     Menopausal symptom     Hypertrophy of bone     Cardiac conduction disorder     Arthralgia of shoulder       Past Medical History:   Diagnosis Date     Asthma      Cardiac conduction disorder 10/7/2022     Depression      Environmental allergies      Esophageal stricture 09/15/2014    EGD done at [facility].     Gastric ulcer 09/15/2014    EGD done at [facility].     GERD (gastroesophageal reflux disease)      Left leg DVT (H)      Lupus (H)        Past Surgical History:   Procedure Laterality Date     ABDOMINOPLASTY       APPENDECTOMY        SECTION       ESOPHAGOSCOPY, GASTROSCOPY, DUODENOSCOPY (EGD), COMBINED   09/15/2014    Esophageal stricture and gastric ulceration.     EXCISION / CURETTAGE BONE CYST PHALANGES OF FOOT Left 2008     HYSTERECTOMY TOTAL ABDOMINAL, BILATERAL SALPINGO-OOPHORECTOMY, COMBINED  1987    for ovarian infection.     MANDIBLE SURGERY  2007    lower jaw advancement. Left over numbness of tongue and gums.Totally 5 surgeries.     OVARIAN CYST SURGERY Right 1977     SINUS SURGERY Bilateral 1996       Current Outpatient Medications   Medication     CERAVE Crea cream     cetirizine (ZYRTEC) 10 MG tablet     diazepam (VALIUM) 10 MG tablet     gabapentin (NEURONTIN) 300 MG capsule     omeprazole (PRILOSEC) 20 MG DR capsule     PROAIR  (90 Base) MCG/ACT inhaler     sertraline (ZOLOFT) 25 MG tablet     triamcinolone (KENALOG) 0.1 % external cream     XARELTO ANTICOAGULANT 20 MG TABS tablet     No current facility-administered medications for this visit.       Allergies   Allergen Reactions     Adhesive Tape Itching     Augmentin Unknown     itchy     Clavulanic Acid      Clindamycin Unknown     Fentanyl Unknown     itchy     Hydroxyzine      Oxycodone      Penicillins Unknown     itchy     Septra [Sulfamethoxazole W/Trimethoprim] Unknown     itchy     Tramadol Unknown     itchy     Trimethoprim      Zyban [Bupropion] Unknown     Personality Change     Altex Rash     Cephalosporins Rash     Oxycodone-Acetaminophen Rash       Family History   Problem Relation Age of Onset     Hypertension Mother      Thyroid Disease Mother      Arthritis Mother      Breast Cancer Mother 85        breast cancer     Hypertension Father      Heart Disease Father         Valve replacement ,bypass     Arthritis Father      Breast Cancer Maternal Grandmother         70s     Other - See Comments Maternal Grandmother         some sort blood clot.     Anxiety Disorder Brother      Obesity Brother      Rectal Cancer Brother      Depression Brother      Gout Son      Gout Son      Breast Cancer Paternal Aunt         40-50        Social History     Socioeconomic History     Marital status:      Spouse name: Daniel     Number of children: 2     Years of education: 13     Highest education level: Some college, no degree   Occupational History     Occupation: Retired   Tobacco Use     Smoking status: Former     Packs/day: 0.50     Years: 40.00     Pack years: 20.00     Types: Cigarettes     Quit date: 2022     Years since quittin.9     Smokeless tobacco: Never   Vaping Use     Vaping Use: Never used   Substance and Sexual Activity     Alcohol use: Yes     Comment: Occasional     Drug use: Never     Sexual activity: Yes     Partners: Male     Birth control/protection: Female Surgical         Review of Systems  Pertinent items are noted in HPI.      Objective:                   General Appearance:    /71   Pulse 106   Temp 99.2  F (37.3  C) (Oral)   Resp 16   Wt 70.3 kg (155 lb)   LMP  (LMP Unknown)   SpO2 96%   BMI 27.03 kg/m          Alert, moderately ill-appearing but in no distress   Head:    Normocephalic, without obvious abnormality, atraumatic   Eyes:    Conjunctiva/corneas clear   Ears:    Normal TM's without erythema or bulging. Normal external ear canals, both ears   Nose:   Nares normal, septum midline, mucosa normal, no drainage    or sinus tenderness   Throat:   Lips, mucosa, and tongue normal; teeth and gums normal.  No tonsilar hypertrophy or exudate.   Neck:   Supple, symmetrical, trachea midline, no adenopathy    Lungs:    Scattered rhonchi heard throughout her lung fields left greater than right.    Heart:    Regular rate and rhythm, S1 and S2 normal, no murmur, rub or gallop       Extremities:   Extremities normal, atraumatic, no cyanosis or edema   Skin:   Skin color, texture, turgor normal, no rashes or lesions         Results for orders placed or performed in visit on 12/15/22   XR Chest 2 Views     Status: None    Narrative    EXAM: XR CHEST 2 VIEWS  LOCATION: [facility]  [facility]  DATE/TIME: 12/15/2022 10:55 AM    INDICATION:  Acute cough  COMPARISON: None.      Impression    IMPRESSION: Negative chest.   Results for orders placed or performed in visit on 12/15/22   Influenza A & B Antigen - Clinic Collect     Status: Normal    Specimen: Nose; Swab   Result Value Ref Range    Influenza A antigen Negative Negative    Influenza B antigen Negative Negative    Narrative    Test results must be correlated with clinical data. If necessary, results should be confirmed by a molecular assay or viral culture.       This note has been dictated using voice recognition software. Any grammatical or context distortions are unintentional and inherent to the software

## 2023-01-19 ENCOUNTER — OFFICE VISIT (OUTPATIENT)
Dept: FAMILY MEDICINE | Facility: CLINIC | Age: 63
End: 2023-01-19
Payer: OTHER GOVERNMENT

## 2023-01-19 VITALS
DIASTOLIC BLOOD PRESSURE: 74 MMHG | WEIGHT: 153 LBS | RESPIRATION RATE: 14 BRPM | TEMPERATURE: 98 F | OXYGEN SATURATION: 97 % | BODY MASS INDEX: 26.68 KG/M2 | SYSTOLIC BLOOD PRESSURE: 114 MMHG | HEART RATE: 87 BPM

## 2023-01-19 DIAGNOSIS — N30.00 ACUTE CYSTITIS WITHOUT HEMATURIA: Primary | ICD-10-CM

## 2023-01-19 DIAGNOSIS — R30.0 DYSURIA: ICD-10-CM

## 2023-01-19 LAB
ALBUMIN UR-MCNC: NEGATIVE MG/DL
APPEARANCE UR: ABNORMAL
BACTERIA #/AREA URNS HPF: ABNORMAL /HPF
BILIRUB UR QL STRIP: NEGATIVE
COLOR UR AUTO: YELLOW
GLUCOSE UR STRIP-MCNC: NEGATIVE MG/DL
HGB UR QL STRIP: ABNORMAL
KETONES UR STRIP-MCNC: NEGATIVE MG/DL
LEUKOCYTE ESTERASE UR QL STRIP: ABNORMAL
NITRATE UR QL: NEGATIVE
PH UR STRIP: 6 [PH] (ref 5–8)
RBC #/AREA URNS AUTO: ABNORMAL /HPF
SP GR UR STRIP: 1.01 (ref 1–1.03)
SQUAMOUS #/AREA URNS AUTO: ABNORMAL /LPF
UROBILINOGEN UR STRIP-ACNC: 0.2 E.U./DL
WBC #/AREA URNS AUTO: ABNORMAL /HPF

## 2023-01-19 PROCEDURE — 99214 OFFICE O/P EST MOD 30 MIN: CPT | Performed by: FAMILY MEDICINE

## 2023-01-19 PROCEDURE — 87186 SC STD MICRODIL/AGAR DIL: CPT | Performed by: FAMILY MEDICINE

## 2023-01-19 PROCEDURE — 87086 URINE CULTURE/COLONY COUNT: CPT | Performed by: FAMILY MEDICINE

## 2023-01-19 PROCEDURE — 81001 URINALYSIS AUTO W/SCOPE: CPT | Performed by: FAMILY MEDICINE

## 2023-01-19 RX ORDER — NITROFURANTOIN 25; 75 MG/1; MG/1
100 CAPSULE ORAL 2 TIMES DAILY
Qty: 10 CAPSULE | Refills: 0 | Status: SHIPPED | OUTPATIENT
Start: 2023-01-19 | End: 2023-01-24

## 2023-01-20 ENCOUNTER — MYC MEDICAL ADVICE (OUTPATIENT)
Dept: FAMILY MEDICINE | Facility: CLINIC | Age: 63
End: 2023-01-20
Payer: OTHER GOVERNMENT

## 2023-01-20 ENCOUNTER — MYC REFILL (OUTPATIENT)
Dept: FAMILY MEDICINE | Facility: CLINIC | Age: 63
End: 2023-01-20
Payer: OTHER GOVERNMENT

## 2023-01-20 DIAGNOSIS — D68.62 LUPUS ANTICOAGULANT DISORDER (H): ICD-10-CM

## 2023-01-20 DIAGNOSIS — M54.2 CERVICAL PAIN (NECK): ICD-10-CM

## 2023-01-20 LAB — BACTERIA UR CULT: ABNORMAL

## 2023-01-23 RX ORDER — RIVAROXABAN 20 MG/1
20 TABLET, FILM COATED ORAL DAILY
Qty: 90 TABLET | Refills: 3 | Status: SHIPPED | OUTPATIENT
Start: 2023-01-23 | End: 2023-08-17

## 2023-01-23 RX ORDER — DIAZEPAM 10 MG
TABLET ORAL
Qty: 15 TABLET | Refills: 0 | Status: SHIPPED | OUTPATIENT
Start: 2023-01-23 | End: 2023-02-16

## 2023-01-27 ENCOUNTER — TRANSFERRED RECORDS (OUTPATIENT)
Dept: HEALTH INFORMATION MANAGEMENT | Facility: CLINIC | Age: 63
End: 2023-01-27

## 2023-02-12 ENCOUNTER — OFFICE VISIT (OUTPATIENT)
Dept: FAMILY MEDICINE | Facility: CLINIC | Age: 63
End: 2023-02-12
Payer: OTHER GOVERNMENT

## 2023-02-12 VITALS
RESPIRATION RATE: 16 BRPM | BODY MASS INDEX: 26.5 KG/M2 | HEART RATE: 110 BPM | TEMPERATURE: 98.4 F | WEIGHT: 152 LBS | SYSTOLIC BLOOD PRESSURE: 136 MMHG | OXYGEN SATURATION: 96 % | DIASTOLIC BLOOD PRESSURE: 76 MMHG

## 2023-02-12 DIAGNOSIS — J06.9 UPPER RESPIRATORY TRACT INFECTION, UNSPECIFIED TYPE: ICD-10-CM

## 2023-02-12 DIAGNOSIS — J98.01 ACUTE BRONCHOSPASM: Primary | ICD-10-CM

## 2023-02-12 PROCEDURE — 99213 OFFICE O/P EST LOW 20 MIN: CPT | Performed by: FAMILY MEDICINE

## 2023-02-12 RX ORDER — PREDNISONE 20 MG/1
20 TABLET ORAL DAILY
Qty: 10 TABLET | Refills: 0 | Status: SHIPPED | OUTPATIENT
Start: 2023-02-12 | End: 2023-03-27

## 2023-02-12 NOTE — PROGRESS NOTES
Assessment & Plan     Acute bronchospasm  Upper respiratory tract infection, unspecified type    - predniSONE (DELTASONE) 20 MG tablet; Take 1 tablet (20 mg) by mouth daily    Continue with rest and fluids.  Prednisone burst for repeat bronchospasm and albuterol prn.  Close Follow-up if no change or new or worsening sx prn.    Jaqueline Nino MD  [facility] CenterMACHELLE Chowdhury is a 62 year old, presenting for the following health issues:  Cough (On/off x8 weeks/), Shortness of Breath, and Back Pain      HPI     Seen for viral URI 12/15/2022 with prolonged cough that persisted x 8 weeks.  Finally resolved x 1 week- now is back.  Hard to take deep breath without coughing.  No fever or ST or ear pain.  Has been using albuterol more that usual to mitigate the cough.    Review of Systems   Constitutional, HEENT, cardiovascular, pulmonary, GI, , musculoskeletal, neuro, skin, endocrine and psych systems are negative, except as otherwise noted.      Objective    /76 (BP Location: Right arm, Patient Position: Sitting, Cuff Size: Adult Large)   Pulse 110   Temp 98.4  F (36.9  C) (Oral)   Resp 16   Wt 68.9 kg (152 lb)   LMP  (LMP Unknown)   SpO2 96%   BMI 26.50 kg/m    Body mass index is 26.5 kg/m .  Physical Exam   GENERAL: healthy, alert and no distress  EYES: Eyes grossly normal to inspection, PERRL and conjunctivae and sclerae normal  NECK: no adenopathy, no asymmetry, masses, or scars and thyroid normal to palpation  RESP: lungs clear to auscultation - no rales, rhonchi or wheezes, + bronchospasm with inspiration and speaking  CV: regular rate and rhythm, normal S1 S2, no S3 or S4, no murmur, click or rub, no peripheral edema and peripheral pulses strong  ABDOMEN: soft, nontender, no hepatosplenomegaly, no masses and bowel sounds normal  MS: no gross musculoskeletal defects noted, no edema  PSYCH: mentation appears normal, affect normal/bright

## 2023-02-16 DIAGNOSIS — M54.2 CERVICAL PAIN (NECK): ICD-10-CM

## 2023-02-16 RX ORDER — DIAZEPAM 10 MG
TABLET ORAL
Qty: 15 TABLET | Refills: 0 | Status: SHIPPED | OUTPATIENT
Start: 2023-02-16 | End: 2023-06-30

## 2023-03-10 ENCOUNTER — TRANSFERRED RECORDS (OUTPATIENT)
Dept: HEALTH INFORMATION MANAGEMENT | Facility: CLINIC | Age: 63
End: 2023-03-10

## 2023-03-24 ENCOUNTER — TELEPHONE (OUTPATIENT)
Dept: FAMILY MEDICINE | Facility: CLINIC | Age: 63
End: 2023-03-24
Payer: OTHER GOVERNMENT

## 2023-03-24 NOTE — TELEPHONE ENCOUNTER
Reason for Call:  Appointment Request    Patient requesting this type of appt: Pre-op    Requested provider: anyone other than Dr. Granados    Reason patient unable to be scheduled: Not within requested timeframe    When does patient want to be seen/preferred time: by March 30th    Comments: PRE-OP-04/10/23- LEFT HAND SURGERY- DR MELITON LAND AT [facility] [place]    Could we send this information to you in [facility] or would you prefer to receive a phone call?:   Patient would prefer a phone call   Okay to leave a detailed message?: Yes at Cell number on file:    Telephone Information:   Mobile [phone]       Call taken on 3/24/2023 at 1:24 PM by Clarita BALDERAS

## 2023-03-27 ENCOUNTER — OFFICE VISIT (OUTPATIENT)
Dept: FAMILY MEDICINE | Facility: CLINIC | Age: 63
End: 2023-03-27
Payer: OTHER GOVERNMENT

## 2023-03-27 VITALS
BODY MASS INDEX: 26.02 KG/M2 | TEMPERATURE: 98.6 F | SYSTOLIC BLOOD PRESSURE: 108 MMHG | HEART RATE: 72 BPM | DIASTOLIC BLOOD PRESSURE: 74 MMHG | RESPIRATION RATE: 16 BRPM | OXYGEN SATURATION: 95 % | HEIGHT: 64 IN | WEIGHT: 152.4 LBS

## 2023-03-27 DIAGNOSIS — J45.20 MILD INTERMITTENT ASTHMA WITHOUT COMPLICATION: ICD-10-CM

## 2023-03-27 DIAGNOSIS — M19.049 CMC ARTHRITIS: ICD-10-CM

## 2023-03-27 DIAGNOSIS — D72.829 LEUKOCYTOSIS, UNSPECIFIED TYPE: ICD-10-CM

## 2023-03-27 DIAGNOSIS — D68.62 LUPUS ANTICOAGULANT DISORDER (H): ICD-10-CM

## 2023-03-27 DIAGNOSIS — Z01.818 PREOPERATIVE EXAMINATION: Primary | ICD-10-CM

## 2023-03-27 DIAGNOSIS — R73.03 PREDIABETES: ICD-10-CM

## 2023-03-27 PROBLEM — I45.9 CARDIAC CONDUCTION DISORDER: Status: RESOLVED | Noted: 2022-10-07 | Resolved: 2023-03-27

## 2023-03-27 PROBLEM — R60.1 GENERALIZED EDEMA: Status: RESOLVED | Noted: 2022-06-27 | Resolved: 2023-03-27

## 2023-03-27 PROBLEM — M25.519 ARTHRALGIA OF SHOULDER: Status: RESOLVED | Noted: 2022-10-07 | Resolved: 2023-03-27

## 2023-03-27 LAB
ATRIAL RATE - MUSE: 83 BPM
BASOPHILS # BLD AUTO: 0.1 10E3/UL (ref 0–0.2)
BASOPHILS NFR BLD AUTO: 1 %
DIASTOLIC BLOOD PRESSURE - MUSE: NORMAL MMHG
EOSINOPHIL # BLD AUTO: 0.2 10E3/UL (ref 0–0.7)
EOSINOPHIL NFR BLD AUTO: 2 %
ERYTHROCYTE [DISTWIDTH] IN BLOOD BY AUTOMATED COUNT: 13.6 % (ref 10–15)
ERYTHROCYTE [DISTWIDTH] IN BLOOD BY AUTOMATED COUNT: 13.7 % (ref 10–15)
HCT VFR BLD AUTO: 45 % (ref 35–47)
HCT VFR BLD AUTO: 45.7 % (ref 35–47)
HGB BLD-MCNC: 14.8 G/DL (ref 11.7–15.7)
HGB BLD-MCNC: 14.8 G/DL (ref 11.7–15.7)
IMM GRANULOCYTES # BLD: 0 10E3/UL
IMM GRANULOCYTES NFR BLD: 0 %
INTERPRETATION ECG - MUSE: NORMAL
LYMPHOCYTES # BLD AUTO: 2.2 10E3/UL (ref 0.8–5.3)
LYMPHOCYTES NFR BLD AUTO: 16 %
MCH RBC QN AUTO: 32.5 PG (ref 26.5–33)
MCH RBC QN AUTO: 33 PG (ref 26.5–33)
MCHC RBC AUTO-ENTMCNC: 32.4 G/DL (ref 31.5–36.5)
MCHC RBC AUTO-ENTMCNC: 32.9 G/DL (ref 31.5–36.5)
MCV RBC AUTO: 100 FL (ref 78–100)
MCV RBC AUTO: 100 FL (ref 78–100)
MONOCYTES # BLD AUTO: 1 10E3/UL (ref 0–1.3)
MONOCYTES NFR BLD AUTO: 7 %
NEUTROPHILS # BLD AUTO: 10.2 10E3/UL (ref 1.6–8.3)
NEUTROPHILS NFR BLD AUTO: 74 %
P AXIS - MUSE: 65 DEGREES
PLATELET # BLD AUTO: 268 10E3/UL (ref 150–450)
PLATELET # BLD AUTO: 281 10E3/UL (ref 150–450)
PR INTERVAL - MUSE: 140 MS
QRS DURATION - MUSE: 72 MS
QT - MUSE: 358 MS
QTC - MUSE: 420 MS
R AXIS - MUSE: 67 DEGREES
RBC # BLD AUTO: 4.48 10E6/UL (ref 3.8–5.2)
RBC # BLD AUTO: 4.55 10E6/UL (ref 3.8–5.2)
SYSTOLIC BLOOD PRESSURE - MUSE: NORMAL MMHG
T AXIS - MUSE: 62 DEGREES
VENTRICULAR RATE- MUSE: 83 BPM
WBC # BLD AUTO: 13.7 10E3/UL (ref 4–11)
WBC # BLD AUTO: 13.9 10E3/UL (ref 4–11)

## 2023-03-27 PROCEDURE — 93005 ELECTROCARDIOGRAM TRACING: CPT

## 2023-03-27 PROCEDURE — 85025 COMPLETE CBC W/AUTO DIFF WBC: CPT

## 2023-03-27 PROCEDURE — 93010 ELECTROCARDIOGRAM REPORT: CPT | Performed by: INTERNAL MEDICINE

## 2023-03-27 PROCEDURE — 99214 OFFICE O/P EST MOD 30 MIN: CPT

## 2023-03-27 PROCEDURE — 36415 COLL VENOUS BLD VENIPUNCTURE: CPT

## 2023-03-27 ASSESSMENT — ASTHMA QUESTIONNAIRES
QUESTION_3 LAST FOUR WEEKS HOW OFTEN DID YOUR ASTHMA SYMPTOMS (WHEEZING, COUGHING, SHORTNESS OF BREATH, CHEST TIGHTNESS OR PAIN) WAKE YOU UP AT NIGHT OR EARLIER THAN USUAL IN THE MORNING: NOT AT ALL
QUESTION_5 LAST FOUR WEEKS HOW WOULD YOU RATE YOUR ASTHMA CONTROL: COMPLETELY CONTROLLED
ACT_TOTALSCORE: 25
QUESTION_1 LAST FOUR WEEKS HOW MUCH OF THE TIME DID YOUR ASTHMA KEEP YOU FROM GETTING AS MUCH DONE AT WORK, SCHOOL OR AT HOME: NONE OF THE TIME
ACT_TOTALSCORE: 25
QUESTION_4 LAST FOUR WEEKS HOW OFTEN HAVE YOU USED YOUR RESCUE INHALER OR NEBULIZER MEDICATION (SUCH AS ALBUTEROL): NOT AT ALL
QUESTION_2 LAST FOUR WEEKS HOW OFTEN HAVE YOU HAD SHORTNESS OF BREATH: NOT AT ALL

## 2023-03-27 ASSESSMENT — PATIENT HEALTH QUESTIONNAIRE - PHQ9
10. IF YOU CHECKED OFF ANY PROBLEMS, HOW DIFFICULT HAVE THESE PROBLEMS MADE IT FOR YOU TO DO YOUR WORK, TAKE CARE OF THINGS AT HOME, OR GET ALONG WITH OTHER PEOPLE: NOT DIFFICULT AT ALL
SUM OF ALL RESPONSES TO PHQ QUESTIONS 1-9: 2
SUM OF ALL RESPONSES TO PHQ QUESTIONS 1-9: 2

## 2023-03-27 NOTE — ASSESSMENT & PLAN NOTE
Indication for surgery.  [facility] notes reviewed, although I do not have access to the most recent visits where the decision was made to repeat the arthroplasty.  Patient reports significant pain and dysfunction in the joint to the point where is affecting her daily life.

## 2023-03-27 NOTE — ASSESSMENT & PLAN NOTE
Reports very good control symptoms.  Very rarely uses her rescue inhaler.  She will bring the medication with her to her procedure.

## 2023-03-27 NOTE — ASSESSMENT & PLAN NOTE
Patient is cleared for planned procedure as documented elsewhere.  Today we reviewed generic recommendations, including n.p.o. and bathing guidelines.  Discussed that she be should be hearing from her surgical team closer to her surgery and they will provide her with more detailed instruction.  CBC obtained due to patient's report of a history of anemia.  Has a diagnosis of cardiac conduction disorder listed on her chart, but the patient denies the any cardiac history.  She was seen by cardiology for some atypical chest pain, but her work-up was negative.  EKG was obtained and was normal.

## 2023-03-27 NOTE — ASSESSMENT & PLAN NOTE
Found incidentally on pre-operative CBC. Patient has no systemic or localized signs of infection on history or exam. Afebrile.  Discussed results with BATOOL Toney with Dr. Kumar's team, and they are okay with this result. No need to repeat prior to her planned procedure unless she develops infectious symptoms.

## 2023-03-27 NOTE — PROGRESS NOTES
[place] [id number] [facility] [address]  [facility] [address]  Phone: [phone]  Fax: [phone]  Primary Provider: Rodrick Granados  Pre-op Performing Provider: ARIANNA NICK    PREOPERATIVE EVALUATION:  Today's date: 3/27/2023    Trenton Milian is a 62 year old female who presents for a preoperative evaluation.    Additional Questions 3/27/2023   Roomed by ac   Accompanied by -     Surgical Information:  Surgery/Procedure: CMC joint arthroplasty  Surgery Location: [facility]  Surgeon: Dr. Tammie Kumar  Surgery Date: 4/10  Time of Surgery: am  Where patient plans to recover: At home with family  Fax number for surgical facility: [phone]    Assessment & Plan     The proposed surgical procedure is considered INTERMEDIATE risk.    Problem List Items Addressed This Visit        Respiratory    Mild intermittent asthma without complication     Reports very good control symptoms.  Very rarely uses her rescue inhaler.  She will bring the medication with her to her procedure.            Endocrine    Prediabetes     A1c in 10/2022 was 5.7.  Continue to follow with her primary care provider.            Musculoskeletal and Integumentary    CMC arthritis     Indication for surgery.  [facility] notes reviewed, although I do not have access to the most recent visits where the decision was made to repeat the arthroplasty.  Patient reports significant pain and dysfunction in the joint to the point where is affecting her daily life.            Immune    Lupus anticoagulant disorder (H)     Patient reports that she has been seen by multiple hematologist, although not recently.  Last DVT was 6 years ago and she has been maintained on Xarelto since then.  Review of last preoperative assessment shows that the provider did recommend she visit with hematology to discuss long-term anticoagulation plan with this diagnosis, but she has not done that.  Order placed today as she is amenable  to a repeat consultation.  Reviewed surgical plan from October of last year, in which Xarelto was held for 3 days which was decided in consultation with the preoperative physician and the surgeon, as patient is not maintained with hematology.  Patient reports that this is the upcoming plan for this surgery and I have discussed with BATOOL Toney with Dr. Lawson Kumar's team at [facility].  Patient will hold starting 4/7.         Relevant Orders    Adult Oncology/Hematology  Referral    Leukocytosis, unspecified type     Found incidentally on pre-operative CBC. Patient has no systemic or localized signs of infection on history or exam. Afebrile.  Discussed results with BATOOL Toney with Dr. Kumar's team, and they are okay with this result. No need to repeat prior to her planned procedure unless she develops infectious symptoms.             Other    Preoperative examination - Primary     Patient is cleared for planned procedure as documented elsewhere.  Today we reviewed generic recommendations, including n.p.o. and bathing guidelines.  Discussed that she be should be hearing from her surgical team closer to her surgery and they will provide her with more detailed instruction.  CBC obtained due to patient's report of a history of anemia.  Has a diagnosis of cardiac conduction disorder listed on her chart, but the patient denies the any cardiac history.  She was seen by cardiology for some atypical chest pain, but her work-up was negative.  EKG was obtained and was normal.         Relevant Orders    EKG 12-lead, tracing only (Completed)    CBC with platelets (Completed)       Risks and Recommendations:  The patient has the following additional risks and recommendations for perioperative complications:   - No identified additional risk factors other than previously addressed    Medication Instructions:   - apixaban (Eliquis), edoxaban (Savaysa), rivaroxaban (Xarelto): HOLD 3 days before  surgery. Discussed with BATOOL Toney with [facility] and the surgical team is amenable to this plan as it was done previously.    RECOMMENDATION:  APPROVAL GIVEN to proceed with proposed procedure, without further diagnostic evaluation.    Subjective     HPI related to upcoming procedure: Patient fell in March, 2022 and after this she was found to have a bone spur that broke off and compromised her joint. She did cortisone shots in this joint and eventually had a surgery to remove the joint in October, 2022. She had a second opinion who recommended a repeat surgery as she is still having pain and now decreased function. Has been doing occupational therapy and splinting with no relief.     Preop Questions 3/27/2023   1. Have you ever had a heart attack or stroke? No   2. Have you ever had surgery on your heart or blood vessels, such as a stent placement, a coronary artery bypass, or surgery on an artery in your head, neck, heart, or legs? No   3. Do you have chest pain with activity? No   4. Do you have a history of  heart failure? No   5. Do you currently have a cold, bronchitis or symptoms of other infection? No   6. Do you have a cough, shortness of breath, or wheezing? No   7. Do you or anyone in your family have previous history of blood clots? YES - has lupus anticoagulant. Has had DVTs x2, last 6 years ago. Maintained on Xarelto.   8. Do you or does anyone in your family have a serious bleeding problem such as prolonged bleeding following surgeries or cuts? No   9. Have you ever had problems with anemia or been told to take iron pills? YES - has had iron infusions in the past   10. Have you had any abnormal blood loss such as black, tarry or bloody stools, or abnormal vaginal bleeding? No   11. Have you ever had a blood transfusion? No   12. Are you willing to have a blood transfusion if it is medically needed before, during, or after your surgery? Yes   13. Have you or any of your relatives ever  had problems with anesthesia? No   14. Do you have sleep apnea, excessive snoring or daytime drowsiness? No   15. Do you have any artifical heart valves or other implanted medical devices like a pacemaker, defibrillator, or continuous glucose monitor? No   16. Do you have artificial joints? No   17. Are you allergic to latex? No       Health Care Directive:  Patient does not have a Health Care Directive or Living Will: Discussed advance care planning with patient; however, patient declined at this time.    Preoperative Review of :   reviewed - controlled substances reflected in medication list.     Status of Chronic Conditions:  See problem list for active medical problems.  Problems all longstanding and stable, except as noted/documented.  See ROS for pertinent symptoms related to these conditions.      Review of Systems  CONSTITUTIONAL: NEGATIVE for fever, chills, change in weight  INTEGUMENTARY/SKIN: NEGATIVE for worrisome rashes, moles or lesions  EYES: NEGATIVE for vision changes or irritation  ENT/MOUTH: NEGATIVE for ear, mouth and throat problems  RESP: NEGATIVE for significant cough or SOB  CV: NEGATIVE for chest pain, palpitations or peripheral edema  GI: NEGATIVE for nausea, abdominal pain, heartburn, or change in bowel habits  : NEGATIVE for frequency, dysuria, or hematuria  MUSCULOSKELETAL: NEGATIVE for significant arthralgias or myalgia. Left hand joint pain per HPI.  NEURO: NEGATIVE for weakness, dizziness or paresthesias  ENDOCRINE: NEGATIVE for temperature intolerance, skin/hair changes  HEME: NEGATIVE for bleeding problems  PSYCHIATRIC: NEGATIVE for changes in mood or affect    Patient Active Problem List    Diagnosis Date Noted     CMC arthritis 03/27/2023     Priority: Medium     Preoperative examination 03/27/2023     Priority: Medium     Leukocytosis, unspecified type 03/27/2023     Priority: Medium     Vitamin B12 deficiency 10/07/2022     Priority: Medium     Prediabetes 10/07/2022      Priority: Medium     Menopausal symptom 10/07/2022     Priority: Medium     Premarin 0.3 mg QAM       Hypertrophy of bone 10/07/2022     Priority: Medium     advise on removal vs tx via steroid injections  w/c- will put into S-3       History of gastric ulcer 2022     Priority: Medium     Weight gain 2022     Priority: Medium     Mild intermittent asthma without complication 2022     Priority: Medium     Lupus anticoagulant disorder (H) 09/10/2019     Priority: Medium     Esophageal stricture 09/10/2019     Priority: Medium     History of recurrent deep vein thrombosis (DVT)      Priority: Medium     Anxiety disorder, unspecified 2017     Priority: Medium     Hyperlipidemia, unspecified 2016     Priority: Medium      Past Medical History:   Diagnosis Date     Arthralgia of shoulder 10/7/2022     Asthma      Cardiac conduction disorder 10/7/2022     Depression      Environmental allergies      Esophageal stricture 09/15/2014    EGD done at [facility].     Gastric ulcer 09/15/2014    EGD done at [facility] ER.     Generalized edema 2022     GERD (gastroesophageal reflux disease)      Left leg DVT (H)      Lupus (H)      Past Surgical History:   Procedure Laterality Date     ABDOMINOPLASTY       APPENDECTOMY        SECTION       ESOPHAGOSCOPY, GASTROSCOPY, DUODENOSCOPY (EGD), COMBINED  09/15/2014    Esophageal stricture and gastric ulceration.     EXCISION / CURETTAGE BONE CYST PHALANGES OF FOOT Left      HYSTERECTOMY TOTAL ABDOMINAL, BILATERAL SALPINGO-OOPHORECTOMY, COMBINED      for ovarian infection.     MANDIBLE SURGERY      lower jaw advancement. Left over numbness of tongue and gums.Totally 5 surgeries.     OVARIAN CYST SURGERY Right 1977     SINUS SURGERY Bilateral      Current Outpatient Medications   Medication Sig Dispense Refill     CERAVE Crea cream [CERAVE CREA CREAM]        cetirizine (ZYRTEC) 10 MG tablet TAKE 1  "TABLET DAILY 90 tablet 3     diazepam (VALIUM) 10 MG tablet TAKE ONE-HALF (1/2) TO ONE TABLET AT BEDTIME AS NEEDED 15 tablet 0     gabapentin (NEURONTIN) 300 MG capsule        omeprazole (PRILOSEC) 20 MG DR capsule TAKE 1 CAPSULE DAILY BEFORE BREAKFAST 90 capsule 3     PROAIR  (90 Base) MCG/ACT inhaler Inhale 2 puffs into the lungs every 4 hours as needed for shortness of breath / dyspnea or wheezing 18 g 3     sertraline (ZOLOFT) 25 MG tablet [SERTRALINE (ZOLOFT) 25 MG TABLET] Take 1 tablet (25 mg total) by mouth daily. 90 tablet 3     XARELTO ANTICOAGULANT 20 MG TABS tablet Take 1 tablet (20 mg) by mouth daily 90 tablet 3     triamcinolone (KENALOG) 0.1 % external cream Apply topically 2 times daily (Patient not taking: Reported on 3/27/2023) 80 g 1       Allergies   Allergen Reactions     Adhesive Tape Itching     Augmentin Unknown     itchy     Clarithromycin      Clavulanic Acid      Clindamycin Unknown     Fentanyl Unknown     itchy     Hydroxyzine      Levofloxacin      Moviprep [Peg-Kcl-Nacl-Nasulf-Na Asc-C]      No Clinical Screening - See Comments      Oxycodone      Penicillins Unknown     itchy     Septra [Sulfamethoxazole W/Trimethoprim] Unknown     itchy     Tramadol Unknown     itchy     Trimethoprim      Zyban [Bupropion] Unknown     Personality Change     Altex Rash     Cephalosporins Rash     Oxycodone-Acetaminophen Rash        Social History     Tobacco Use     Smoking status: Former     Packs/day: 0.50     Years: 40.00     Pack years: 20.00     Types: Cigarettes     Quit date: 2022     Years since quittin.2     Smokeless tobacco: Never   Substance Use Topics     Alcohol use: Yes     Comment: Occasional     History   Drug Use Unknown         Objective     /74 (BP Location: Left arm, Patient Position: Sitting, Cuff Size: Adult Regular)   Pulse 72   Temp 98.6  F (37  C) (Oral)   Resp 16   Ht 1.613 m (5' 3.5\")   Wt 69.1 kg (152 lb 6.4 oz)   LMP  (LMP Unknown)   SpO2 95%  "  BMI 26.57 kg/m      Physical Exam    GENERAL APPEARANCE: healthy, alert and no distress     EYES: EOMI, PERRL     HENT: ear canals and TM's normal and nose and mouth without ulcers or lesions     NECK: no adenopathy, no asymmetry, masses, or scars and thyroid normal to palpation     RESP: lungs clear to auscultation - no rales, rhonchi or wheezes     CV: regular rates and rhythm, normal S1 S2, no S3 or S4 and no murmur, click or rub     ABDOMEN:  soft, nontender, no HSM or masses and bowel sounds normal     MS: extremities normal- no gross deformities noted, no evidence of inflammation in joints, FROM in all extremities. Left CMC joint: deformity and stiffness present, mildly tender to palpation of surrounding structures.     SKIN: no suspicious lesions or rashes     NEURO: Normal strength and tone, sensory exam grossly normal, mentation intact and speech normal     PSYCH: mentation appears normal. and affect normal/bright     LYMPHATICS: No cervical adenopathy    Recent Labs   Lab Test 10/07/22  1444 06/27/22  0830 10/20/21  1007   HGB 14.3 14.5 15.2    254 246   NA  --  141 142   POTASSIUM  --  4.6 4.9   CR  --  0.79 0.82   A1C 5.7*  --   --         Diagnostics:  Recent Results (from the past 24 hour(s))   EKG 12-lead, tracing only    Collection Time: 03/27/23  8:17 AM   Result Value Ref Range    Systolic Blood Pressure  mmHg    Diastolic Blood Pressure  mmHg    Ventricular Rate 83 BPM    Atrial Rate 83 BPM    KS Interval 140 ms    QRS Duration 72 ms     ms    QTc 420 ms    P Axis 65 degrees    R AXIS 67 degrees    T Axis 62 degrees    Interpretation ECG       Sinus rhythm  Normal ECG  No previous ECGs available     CBC with platelets    Collection Time: 03/27/23  8:58 AM   Result Value Ref Range    WBC Count 13.9 (H) 4.0 - 11.0 10e3/uL    RBC Count 4.55 3.80 - 5.20 10e6/uL    Hemoglobin 14.8 11.7 - 15.7 g/dL    Hematocrit 45.7 35.0 - 47.0 %     78 - 100 fL    MCH 32.5 26.5 - 33.0 pg    MCHC  32.4 31.5 - 36.5 g/dL    RDW 13.6 10.0 - 15.0 %    Platelet Count 281 150 - 450 10e3/uL      EKG: appears normal, NSR, normal axis, normal intervals, no acute ST/T changes c/w ischemia, no LVH by voltage criteria, unchanged from previous tracings    Revised Cardiac Risk Index (RCRI):  The patient has the following serious cardiovascular risks for perioperative complications:   - No serious cardiac risks = 0 points     RCRI Interpretation: 0 points: Class I (very low risk - 0.4% complication rate)         Signed Electronically by: CHRISTIANA Antonio CNP  Copy of this evaluation report is provided to requesting physician.

## 2023-03-27 NOTE — ASSESSMENT & PLAN NOTE
Patient reports that she has been seen by multiple hematologist, although not recently.  Last DVT was 6 years ago and she has been maintained on Xarelto since then.  Review of last preoperative assessment shows that the provider did recommend she visit with hematology to discuss long-term anticoagulation plan with this diagnosis, but she has not done that.  Order placed today as she is amenable to a repeat consultation.  Reviewed surgical plan from October of last year, in which Xarelto was held for 3 days which was decided in consultation with the preoperative physician and the surgeon, as patient is not maintained with hematology.  Patient reports that this is the upcoming plan for this surgery and I have discussed with BATOOL Toney with Dr. Lawson Kumar's team at [facility].  Patient will hold starting 4/7.

## 2023-04-10 ENCOUNTER — TRANSFERRED RECORDS (OUTPATIENT)
Dept: HEALTH INFORMATION MANAGEMENT | Facility: CLINIC | Age: 63
End: 2023-04-10
Payer: OTHER GOVERNMENT

## 2023-04-21 ENCOUNTER — TRANSFERRED RECORDS (OUTPATIENT)
Dept: HEALTH INFORMATION MANAGEMENT | Facility: CLINIC | Age: 63
End: 2023-04-21
Payer: OTHER GOVERNMENT

## 2023-05-12 ENCOUNTER — TRANSFERRED RECORDS (OUTPATIENT)
Dept: HEALTH INFORMATION MANAGEMENT | Facility: CLINIC | Age: 63
End: 2023-05-12
Payer: OTHER GOVERNMENT

## 2023-06-01 DIAGNOSIS — K29.50 CHRONIC GASTRITIS WITHOUT BLEEDING, UNSPECIFIED GASTRITIS TYPE: ICD-10-CM

## 2023-06-02 NOTE — TELEPHONE ENCOUNTER
"Last Written Prescription Date:  7/25/22  Last Fill Quantity: 90,  # refills: 3   Last office visit provider:  3/27/23     Requested Prescriptions   Pending Prescriptions Disp Refills     omeprazole (PRILOSEC) 20 MG DR capsule [Pharmacy Med Name: OMEPRAZOLE DR CAPS 20MG] 90 capsule 3     Sig: TAKE 1 CAPSULE DAILY BEFORE BREAKFAST       PPI Protocol Passed - 6/2/2023  1:22 PM        Passed - Not on Clopidogrel (unless Pantoprazole ordered)        Passed - No diagnosis of osteoporosis on record        Passed - Recent (12 mo) or future (30 days) visit within the authorizing provider's specialty     Patient has had an office visit with the authorizing provider or a provider within the authorizing providers department within the previous 12 mos or has a future within next 30 days. See \"Patient Info\" tab in inbasket, or \"Choose Columns\" in Meds & Orders section of the refill encounter.              Passed - Medication is active on med list        Passed - Patient is age 18 or older        Passed - No active pregnacy on record        Passed - No positive pregnancy test in past 12 months             NITHIN WEEKS RN 06/02/23 1:23 PM  "

## 2023-06-09 ENCOUNTER — TRANSFERRED RECORDS (OUTPATIENT)
Dept: HEALTH INFORMATION MANAGEMENT | Facility: CLINIC | Age: 63
End: 2023-06-09
Payer: OTHER GOVERNMENT

## 2023-06-09 DIAGNOSIS — F32.A ANXIETY AND DEPRESSION: ICD-10-CM

## 2023-06-09 DIAGNOSIS — F41.9 ANXIETY AND DEPRESSION: ICD-10-CM

## 2023-06-09 RX ORDER — SERTRALINE HYDROCHLORIDE 25 MG/1
25 TABLET, FILM COATED ORAL DAILY
Qty: 90 TABLET | Refills: 0 | Status: SHIPPED | OUTPATIENT
Start: 2023-06-09 | End: 2023-06-19

## 2023-06-09 NOTE — TELEPHONE ENCOUNTER
Pending Prescriptions:                       Disp   Refills    sertraline (ZOLOFT) 25 MG tablet          90 tab*3            Sig: Take 1 tablet (25 mg) by mouth daily

## 2023-06-19 ENCOUNTER — OFFICE VISIT (OUTPATIENT)
Dept: FAMILY MEDICINE | Facility: CLINIC | Age: 63
End: 2023-06-19
Payer: OTHER GOVERNMENT

## 2023-06-19 ENCOUNTER — MYC REFILL (OUTPATIENT)
Dept: FAMILY MEDICINE | Facility: CLINIC | Age: 63
End: 2023-06-19

## 2023-06-19 VITALS
OXYGEN SATURATION: 95 % | HEIGHT: 63 IN | TEMPERATURE: 98.5 F | DIASTOLIC BLOOD PRESSURE: 70 MMHG | WEIGHT: 149.13 LBS | SYSTOLIC BLOOD PRESSURE: 120 MMHG | HEART RATE: 85 BPM | BODY MASS INDEX: 26.42 KG/M2

## 2023-06-19 DIAGNOSIS — F41.9 ANXIETY AND DEPRESSION: ICD-10-CM

## 2023-06-19 DIAGNOSIS — F32.1 CURRENT MODERATE EPISODE OF MAJOR DEPRESSIVE DISORDER WITHOUT PRIOR EPISODE (H): ICD-10-CM

## 2023-06-19 DIAGNOSIS — R53.83 OTHER FATIGUE: Primary | ICD-10-CM

## 2023-06-19 DIAGNOSIS — G25.81 RESTLESS LEG SYNDROME: ICD-10-CM

## 2023-06-19 DIAGNOSIS — F32.A ANXIETY AND DEPRESSION: ICD-10-CM

## 2023-06-19 DIAGNOSIS — F41.9 ANXIETY: ICD-10-CM

## 2023-06-19 PROCEDURE — 84443 ASSAY THYROID STIM HORMONE: CPT | Performed by: STUDENT IN AN ORGANIZED HEALTH CARE EDUCATION/TRAINING PROGRAM

## 2023-06-19 PROCEDURE — 99214 OFFICE O/P EST MOD 30 MIN: CPT | Performed by: STUDENT IN AN ORGANIZED HEALTH CARE EDUCATION/TRAINING PROGRAM

## 2023-06-19 PROCEDURE — 80053 COMPREHEN METABOLIC PANEL: CPT | Performed by: STUDENT IN AN ORGANIZED HEALTH CARE EDUCATION/TRAINING PROGRAM

## 2023-06-19 PROCEDURE — 36415 COLL VENOUS BLD VENIPUNCTURE: CPT | Performed by: STUDENT IN AN ORGANIZED HEALTH CARE EDUCATION/TRAINING PROGRAM

## 2023-06-19 RX ORDER — PRAMIPEXOLE DIHYDROCHLORIDE 0.12 MG/1
TABLET ORAL
Qty: 98 TABLET | Refills: 0 | Status: SHIPPED | OUTPATIENT
Start: 2023-06-19 | End: 2023-06-30

## 2023-06-19 RX ORDER — DIAZEPAM 10 MG
10 TABLET ORAL EVERY 6 HOURS PRN
Qty: 15 TABLET | Refills: 0 | Status: SHIPPED | OUTPATIENT
Start: 2023-06-19 | End: 2023-09-03

## 2023-06-19 ASSESSMENT — ENCOUNTER SYMPTOMS
WEAKNESS: 1
HEARTBURN: 1
COUGH: 0
ARTHRALGIAS: 1
DIZZINESS: 0
FEVER: 0
ABDOMINAL PAIN: 1
NAUSEA: 0
HEADACHES: 0
CHILLS: 0
DYSURIA: 0
MYALGIAS: 1
SORE THROAT: 0
PARESTHESIAS: 0
SHORTNESS OF BREATH: 0
CONSTIPATION: 0
EYE PAIN: 0
HEMATURIA: 0
FREQUENCY: 0
JOINT SWELLING: 0
PALPITATIONS: 1
DIARRHEA: 0
HEMATOCHEZIA: 0
BREAST MASS: 0
NERVOUS/ANXIOUS: 0

## 2023-06-19 ASSESSMENT — PAIN SCALES - GENERAL: PAINLEVEL: MILD PAIN (3)

## 2023-06-19 NOTE — PROGRESS NOTES
Assessment/ Plan   62 year old female with PMH of peripheral neuropathy, restless leg syndrome, MDD who presents to discuss a constellation of symptoms including, fatigue, sleepiness, worsening restless leg and neuropathy, chronic iron issues.  I do not think these symptoms are easily explained by one etiology. More likely multiple contributory factors. I think her depression is worse lately with a PHQ-9 of 13. She likes her zoloft but is on a very low dose of 12.5 mg daily. I recommended going up to 25 mg daily. She is nervous about doing this due to medicine sensitivity. I recommended some workup for fatigue with labs as below as well as a sleep study. She is seeing a neurologist now for peripheral neuropathy and restless leg. Also managing her chronic iron deficiency with oral iron and transfusion. These were normal on last check a couple weeks ago. She already takes low dose gabapentin at night. I recommended trialing requip for further benefit to restless leg syndrome.     1. Other fatigue  - TSH with free T4 reflex; Future  - Adult Sleep Eval & Management  Referral; Future  - Comprehensive metabolic panel (BMP + Alb, Alk Phos, ALT, AST, Total. Bili, TP); Future  - TSH with free T4 reflex  - Comprehensive metabolic panel (BMP + Alb, Alk Phos, ALT, AST, Total. Bili, TP)    2. Restless leg syndrome  - pramipexole (MIRAPEX) 0.125 MG tablet; Take 1 tablet (0.125 mg) by mouth At Bedtime for 14 days, THEN 2 tablets (0.25 mg) At Bedtime for 14 days, THEN 4 tablets (0.5 mg) At Bedtime for 14 days.  Dispense: 98 tablet; Refill: 0    3. Current moderate episode of major depressive disorder without prior episode (H)  - will increase zoloft to 25 mg daily from 12.5 mg daily    4. Anxiety  - diazepam (VALIUM) 10 MG tablet; Take 1 tablet (10 mg) by mouth every 6 hours as needed for anxiety  Dispense: 15 tablet; Refill: 0    Follow-up in: 3-6 months for physical and establish care visit    Getachew De La Cruz  MD    Subjective:     Trenton Milian is a 62 year old female who presents for an annual exam.     Chief Complaint   Patient presents with     Physical     Arm pain, feeling unwell, fatigue, med check, neuropathy, update immunization       patient tells me she does not feel well. She feels very fatigued and sleepy during the day despite adequate sleep.  Does not feel rested after sleeping. She has restless leg syndrome and has neuropathy. This keep her up at night. She has chronically low iron and has been getting intermittent iron transfusions and taking oral iron.         6/1/2021    12:00 PM 6/27/2022     7:04 AM 3/27/2023     7:46 AM   PHQ   PHQ-9 Total Score 13 4 2   Q9: Thoughts of better off dead/self-harm past 2 weeks Not at all Not at all Not at all       Stop Bang Questionnaire     STOP-BANG Sleep Apnea Questionnaire  Shankar PIÑA et al Anesthesiology 2008 and BJA 2012    STOP  Do you SNORE loudly (louder than talking or loud  enough to be heard through closed doors)? YES    Do you often feel TIRED, fatigued, or sleepy during  Daytime? YES    Has anyone OBSERVED you stop breathing during  your sleep? No    Do you have or are you being treated for high blood  PRESSURE? No    BANG  BMI more than 35kg/m2? No  AGE over 50 years old? YES  NECK circumference > 16 inches (40cm)? No  GENDER: Male? No    TOTAL SCORE  High risk of DIEGO: Yes 5 - 8  Intermediate risk of DIEGO: Yes 3 - 4  Low risk of DIEGO: Yes 0 - 2    Colonoscopy:  Dexa:  Mammogram:  Pap smear:    Answers for HPI/ROS submitted by the patient on 6/19/2023  Frequency of exercise:: 4-5 days/week  Getting at least 3 servings of Calcium per day:: Yes  Diet:: Regular (no restrictions)  Taking medications regularly:: Yes  Medication side effects:: Other  Bi-annual eye exam:: Yes  Dental care twice a year:: Yes  Sleep apnea or symptoms of sleep apnea:: Daytime drowsiness  abdominal pain: Yes  Blood in stool: No  Blood in urine: No  chest pain: No  chills:  No  congestion: No  constipation: No  cough: No  diarrhea: No  dizziness: No  ear pain: No  eye pain: No  nervous/anxious: No  fever: No  frequency: No  genital sores: No  headaches: No  hearing loss: No  heartburn: Yes  arthralgias: Yes  joint swelling: No  peripheral edema: No  mood changes: No  myalgias: Yes  nausea: No  dysuria: No  palpitations: Yes  Skin sensation changes: No  sore throat: No  urgency: No  rash: No  shortness of breath: No  visual disturbance: No  weakness: Yes  pelvic pain: No  vaginal bleeding: No  vaginal discharge: No  tenderness: No  breast mass: No  breast discharge: No  Duration of exercise:: 30-45 minutes    Immunization History   Administered Date(s) Administered     COVID-19 MONOVALENT 12+ (Pfizer) 02/01/2021, 02/22/2021     COVID-19 Monovalent 12+ (Pfizer 2022) 05/10/2022     Influenza Vaccine 50-64 or 18-64 w/egg allergy (Flublok) 11/20/2021     Influenza Vaccine >6 months (Alfuria,Fluzone) 09/15/2020     Pneumococcal 23 valent 09/15/2020     TDAP (Adacel,Boostrix) 03/10/2016, 11/18/2019     Twinrix A/B 03/10/2016, 04/18/2016, 11/28/2018     Typhoid IM 03/10/2016, 11/18/2019     Zoster recombinant adjuvanted (SHINGRIX) 01/27/2021, 06/01/2021     Zoster vaccine, live 03/20/2012     Immunization status: due today.     Current Outpatient Medications   Medication Sig Dispense Refill     CERAVE Crea cream [CERAVE CREA CREAM]        cetirizine (ZYRTEC) 10 MG tablet TAKE 1 TABLET DAILY 90 tablet 3     diazepam (VALIUM) 10 MG tablet TAKE ONE-HALF (1/2) TO ONE TABLET AT BEDTIME AS NEEDED 15 tablet 0     gabapentin (NEURONTIN) 300 MG capsule        omeprazole (PRILOSEC) 20 MG DR capsule TAKE 1 CAPSULE DAILY BEFORE BREAKFAST 90 capsule 3     PROAIR  (90 Base) MCG/ACT inhaler Inhale 2 puffs into the lungs every 4 hours as needed for shortness of breath / dyspnea or wheezing 18 g 3     sertraline (ZOLOFT) 25 MG tablet Take 1 tablet (25 mg) by mouth daily 90 tablet 0     triamcinolone  (KENALOG) 0.1 % external cream Apply topically 2 times daily 80 g 1     XARELTO ANTICOAGULANT 20 MG TABS tablet Take 1 tablet (20 mg) by mouth daily 90 tablet 3     Past Medical History:   Diagnosis Date     Arthralgia of shoulder 10/7/2022     Asthma      Cardiac conduction disorder 10/7/2022     Depression      Environmental allergies      Esophageal stricture 09/15/2014    EGD done at [facility].     Gastric ulcer 09/15/2014    EGD done at [facility].     Generalized edema 2022     GERD (gastroesophageal reflux disease)      Left leg DVT (H)      Lupus (H)      Past Surgical History:   Procedure Laterality Date     ABDOMINOPLASTY       APPENDECTOMY        SECTION       ESOPHAGOSCOPY, GASTROSCOPY, DUODENOSCOPY (EGD), COMBINED  09/15/2014    Esophageal stricture and gastric ulceration.     EXCISION / CURETTAGE BONE CYST PHALANGES OF FOOT Left      HYSTERECTOMY TOTAL ABDOMINAL, BILATERAL SALPINGO-OOPHORECTOMY, COMBINED      for ovarian infection.     MANDIBLE SURGERY      lower jaw advancement. Left over numbness of tongue and gums.Totally 5 surgeries.     OVARIAN CYST SURGERY Right      SINUS SURGERY Bilateral      Adhesive tape, Amoxicillin-pot clavulanate, Clarithromycin, Clavulanic acid, Clindamycin, Fentanyl, Hydroxyzine, Levofloxacin, Moviprep [peg-kcl-nacl-nasulf-na asc-c], No clinical screening - see comments, Oxycodone, Penicillins, Septra [sulfamethoxazole-trimethoprim], Tramadol, Trimethoprim, Zyban [bupropion], Altex, Cephalosporins, and Oxycodone-acetaminophen  Family History   Problem Relation Age of Onset     Hypertension Mother      Thyroid Disease Mother      Arthritis Mother      Breast Cancer Mother 85        breast cancer     Hypertension Father      Heart Disease Father         Valve replacement ,bypass     Arthritis Father      Breast Cancer Maternal Grandmother         70s     Other - See Comments Maternal Grandmother          "some sort blood clot.     Anxiety Disorder Brother      Obesity Brother      Rectal Cancer Brother      Depression Brother      Gout Son      Gout Son      Breast Cancer Paternal Aunt         40-50     Social History     Socioeconomic History     Marital status:      Spouse name: Daniel     Number of children: 2     Years of education: 13     Highest education level: Some college, no degree   Occupational History     Occupation: Retired   Tobacco Use     Smoking status: Former     Packs/day: 0.50     Years: 40.00     Pack years: 20.00     Types: Cigarettes     Quit date: 2022     Years since quittin.4     Smokeless tobacco: Never   Vaping Use     Vaping status: Never Used   Substance and Sexual Activity     Alcohol use: Yes     Comment: Occasional     Drug use: Never     Sexual activity: Yes     Partners: Male     Birth control/protection: Female Surgical   Other Topics Concern     Not on file   Social History Narrative     Not on file     Social Determinants of Health     Financial Resource Strain: Not on file   Food Insecurity: Not on file   Transportation Needs: Not on file   Physical Activity: Not on file   Stress: Not on file   Social Connections: Not on file   Intimate Partner Violence: Not on file   Housing Stability: Not on file       Review of Systems  Complete ROS negative except as noted in the HPI    Objective:      Vitals:    23 1157   BP: 120/70   Pulse: 85   Temp: 98.5  F (36.9  C)   SpO2: 95%   Weight: 67.6 kg (149 lb 2 oz)   Height: 1.6 m (5' 3\")       General appearance: Alert, cooperative, no distress, appears stated age  Head: Normocephalic, atraumatic, without obvious abnormality  EARS: TM's gray dull with structures seen bilaterally  Eyes: Pupils equal round, reactive.  Conjunctiva clear.  Nose: Nares normal, no drainage.  Throat: Lips, mucosa, tongue normal mucosa pink and moist  Neck: Supple, symmetric, trachea midline, no adenopathy.  No thyroid enlargement, tenderness " or nodules.    Psych: Normal-appearing hygiene, speech is normal pace and volume, nontangential, noncircumferential, thoughtprocess is logical, does not appear to responding to internal stimuli, no expression of paranoid delusions, mood is anxious      Getachew Walker MD

## 2023-06-20 LAB
ALBUMIN SERPL BCG-MCNC: 4.4 G/DL (ref 3.5–5.2)
ALP SERPL-CCNC: 146 U/L (ref 35–104)
ALT SERPL W P-5'-P-CCNC: 32 U/L (ref 0–50)
ANION GAP SERPL CALCULATED.3IONS-SCNC: 13 MMOL/L (ref 7–15)
AST SERPL W P-5'-P-CCNC: 32 U/L (ref 0–45)
BILIRUB SERPL-MCNC: <0.2 MG/DL
BUN SERPL-MCNC: 14.2 MG/DL (ref 8–23)
CALCIUM SERPL-MCNC: 9.5 MG/DL (ref 8.8–10.2)
CHLORIDE SERPL-SCNC: 103 MMOL/L (ref 98–107)
CREAT SERPL-MCNC: 0.77 MG/DL (ref 0.51–0.95)
DEPRECATED HCO3 PLAS-SCNC: 23 MMOL/L (ref 22–29)
GFR SERPL CREATININE-BSD FRML MDRD: 86 ML/MIN/1.73M2
GLUCOSE SERPL-MCNC: 86 MG/DL (ref 70–99)
POTASSIUM SERPL-SCNC: 4.1 MMOL/L (ref 3.4–5.3)
PROT SERPL-MCNC: 7.2 G/DL (ref 6.4–8.3)
SODIUM SERPL-SCNC: 139 MMOL/L (ref 136–145)
TSH SERPL DL<=0.005 MIU/L-ACNC: 0.82 UIU/ML (ref 0.3–4.2)

## 2023-06-20 RX ORDER — SERTRALINE HYDROCHLORIDE 25 MG/1
25 TABLET, FILM COATED ORAL DAILY
Qty: 90 TABLET | Refills: 0 | Status: SHIPPED | OUTPATIENT
Start: 2023-06-20 | End: 2023-06-30

## 2023-06-20 NOTE — RESULT ENCOUNTER NOTE
Vanessa  Your results from your recent clinic visit show:  Your CMP was normal with normal electrolytes, kidney function, and liver function  Your thyroid is normal (TSH).    If you have more questions please call the clinic at [phone] or send me a [employer] message    Dr. Getachew De La Cruz

## 2023-06-23 ENCOUNTER — ALLIED HEALTH/NURSE VISIT (OUTPATIENT)
Dept: FAMILY MEDICINE | Facility: CLINIC | Age: 63
End: 2023-06-23
Payer: OTHER GOVERNMENT

## 2023-06-23 DIAGNOSIS — F41.9 ANXIETY AND DEPRESSION: ICD-10-CM

## 2023-06-23 DIAGNOSIS — Z71.84 TRAVEL ADVICE ENCOUNTER: Primary | ICD-10-CM

## 2023-06-23 DIAGNOSIS — F32.A ANXIETY AND DEPRESSION: ICD-10-CM

## 2023-06-23 PROCEDURE — 90471 IMMUNIZATION ADMIN: CPT

## 2023-06-23 PROCEDURE — 99207 PR NO CHARGE NURSE ONLY: CPT

## 2023-06-23 PROCEDURE — 90691 TYPHOID VACCINE IM: CPT

## 2023-06-23 NOTE — TELEPHONE ENCOUNTER
Pending Prescriptions:                       Disp   Refills    sertraline (ZOLOFT) 25 MG tablet          90 tab*0            Sig: Take 1 tablet (25 mg) by mouth daily

## 2023-06-24 RX ORDER — SERTRALINE HYDROCHLORIDE 25 MG/1
25 TABLET, FILM COATED ORAL DAILY
Qty: 90 TABLET | Refills: 0 | OUTPATIENT
Start: 2023-06-24

## 2023-06-27 ENCOUNTER — TRANSFERRED RECORDS (OUTPATIENT)
Dept: HEALTH INFORMATION MANAGEMENT | Facility: CLINIC | Age: 63
End: 2023-06-27
Payer: OTHER GOVERNMENT

## 2023-06-29 PROBLEM — L29.9 PRURITIC DISORDER: Status: ACTIVE | Noted: 2023-06-29

## 2023-06-30 ENCOUNTER — OFFICE VISIT (OUTPATIENT)
Dept: FAMILY MEDICINE | Facility: CLINIC | Age: 63
End: 2023-06-30
Payer: OTHER GOVERNMENT

## 2023-06-30 VITALS
HEIGHT: 63 IN | RESPIRATION RATE: 18 BRPM | HEART RATE: 77 BPM | DIASTOLIC BLOOD PRESSURE: 72 MMHG | OXYGEN SATURATION: 99 % | SYSTOLIC BLOOD PRESSURE: 122 MMHG | BODY MASS INDEX: 26.58 KG/M2 | WEIGHT: 150 LBS

## 2023-06-30 DIAGNOSIS — F32.1 CURRENT MODERATE EPISODE OF MAJOR DEPRESSIVE DISORDER WITHOUT PRIOR EPISODE (H): Primary | ICD-10-CM

## 2023-06-30 DIAGNOSIS — F41.9 ANXIETY: ICD-10-CM

## 2023-06-30 DIAGNOSIS — E61.1 IRON DEFICIENCY: ICD-10-CM

## 2023-06-30 DIAGNOSIS — R41.840 ATTENTION AND CONCENTRATION DEFICIT: ICD-10-CM

## 2023-06-30 DIAGNOSIS — R53.83 OTHER FATIGUE: ICD-10-CM

## 2023-06-30 DIAGNOSIS — G25.81 RESTLESS LEG SYNDROME: ICD-10-CM

## 2023-06-30 DIAGNOSIS — D68.62 LUPUS ANTICOAGULANT DISORDER (H): ICD-10-CM

## 2023-06-30 PROCEDURE — 99214 OFFICE O/P EST MOD 30 MIN: CPT | Performed by: FAMILY MEDICINE

## 2023-06-30 RX ORDER — SERTRALINE HYDROCHLORIDE 25 MG/1
25 TABLET, FILM COATED ORAL DAILY
Qty: 90 TABLET | Refills: 3 | Status: SHIPPED | OUTPATIENT
Start: 2023-06-30

## 2023-06-30 RX ORDER — SERTRALINE HYDROCHLORIDE 25 MG/1
25 TABLET, FILM COATED ORAL DAILY
Qty: 30 TABLET | Refills: 1 | Status: SHIPPED | OUTPATIENT
Start: 2023-06-30 | End: 2023-06-30

## 2023-06-30 ASSESSMENT — ANXIETY QUESTIONNAIRES
3. WORRYING TOO MUCH ABOUT DIFFERENT THINGS: NOT AT ALL
7. FEELING AFRAID AS IF SOMETHING AWFUL MIGHT HAPPEN: NOT AT ALL
GAD7 TOTAL SCORE: 4
6. BECOMING EASILY ANNOYED OR IRRITABLE: NOT AT ALL
1. FEELING NERVOUS, ANXIOUS, OR ON EDGE: NOT AT ALL
GAD7 TOTAL SCORE: 4
4. TROUBLE RELAXING: MORE THAN HALF THE DAYS
2. NOT BEING ABLE TO STOP OR CONTROL WORRYING: NOT AT ALL
5. BEING SO RESTLESS THAT IT IS HARD TO SIT STILL: MORE THAN HALF THE DAYS
IF YOU CHECKED OFF ANY PROBLEMS ON THIS QUESTIONNAIRE, HOW DIFFICULT HAVE THESE PROBLEMS MADE IT FOR YOU TO DO YOUR WORK, TAKE CARE OF THINGS AT HOME, OR GET ALONG WITH OTHER PEOPLE: SOMEWHAT DIFFICULT

## 2023-06-30 ASSESSMENT — ASTHMA QUESTIONNAIRES: ACT_TOTALSCORE: 23

## 2023-06-30 ASSESSMENT — PATIENT HEALTH QUESTIONNAIRE - PHQ9
SUM OF ALL RESPONSES TO PHQ QUESTIONS 1-9: 10
SUM OF ALL RESPONSES TO PHQ QUESTIONS 1-9: 10
10. IF YOU CHECKED OFF ANY PROBLEMS, HOW DIFFICULT HAVE THESE PROBLEMS MADE IT FOR YOU TO DO YOUR WORK, TAKE CARE OF THINGS AT HOME, OR GET ALONG WITH OTHER PEOPLE: VERY DIFFICULT

## 2023-06-30 ASSESSMENT — PAIN SCALES - GENERAL: PAINLEVEL: NO PAIN (0)

## 2023-06-30 NOTE — PROGRESS NOTES
"Trenton Milian  /72   Pulse 77   Resp 18   Ht 1.6 m (5' 3\")   Wt 68 kg (150 lb)   LMP  (LMP Unknown)   SpO2 99%   BMI 26.57 kg/m       Assessment/Plan:                Trenton was seen today for recheck medication, depression and results.    Diagnoses and all orders for this visit:    Current moderate episode of major depressive disorder without prior episode (H)  -     Discontinue: sertraline (ZOLOFT) 25 MG tablet; Take 1 tablet (25 mg) by mouth daily  -     sertraline (ZOLOFT) 25 MG tablet; Take 1 tablet (25 mg) by mouth daily  -     Adult Mental Health  Referral; Future    Other fatigue    Restless leg syndrome    Anxiety    Lupus anticoagulant disorder (H)    Attention and concentration deficit  -     Adult Mental Health  Referral; Future         DISCUSSION  today we spent over 25 minutes discussing mental health mental health management. Patient does also bring up during the course of our conversation concerns about lifelong attention deficit disorder and how this at this point in her life is likely becoming more of a concern. We discussed referral for further evaluation in this regard as well.  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    She also has a history of iron deficiency which is believed to lead to restless leg syndrome, she gets periodic iron infusions under the direction of her neurologist who manages her restless leg syndrome.    Should reason visit with concerns for fatigue, restless leg, depression and anxiety.    Several recommendations were made about medication therapy.    Patient states that she is extremely anxious about medications and side effects. Recommendation was made to increase sertraline to 25 mg and she typically takes 12.5. Patient " declined to do so but also have been having difficulty getting a refill of any medication and has been out for a time. She is most comfortable restarting her sertraline 25 mg. We discussed other ways of managing mental health specifically seeing a therapist and she is willing to do so we discussed making arrangements for this today.    She does not wish to try prima Paxil for management of restless legs and she is very concerned about side effects. She will continue to work with her neurologist regarding iron and iron infusions. She did bring up the question as she does not really feel like she knows why she has iron deficiency. In my knowledge of her health history that we were able to discern at the time of our visit I was not aware of the cause either. We discussed a possible process for going through to try to figure this out further she wishes to contemplate this further at this time.    She does have a lot of fatigue and low energy. Most of what she described seems to fit more with worsening depression symptoms. Reviewed recent laboratory test results.    ROS:  Complete review of systems is obtained.  Other than the specific considerations noted above complete review of systems is negative.    Objective:   Medications:  Current Outpatient Medications   Medication     CERAVE Crea cream     cetirizine (ZYRTEC) 10 MG tablet     gabapentin (NEURONTIN) 300 MG capsule     omeprazole (PRILOSEC) 20 MG DR capsule     PROAIR  (90 Base) MCG/ACT inhaler     sertraline (ZOLOFT) 25 MG tablet     triamcinolone (KENALOG) 0.1 % external cream     XARELTO ANTICOAGULANT 20 MG TABS tablet     diazepam (VALIUM) 10 MG tablet     No current facility-administered medications for this visit.        Allergies:     Allergies   Allergen Reactions     Adhesive Tape Itching     Amoxicillin-Pot Clavulanate Unknown     itchy     Clarithromycin      Clavulanic Acid      Clindamycin Unknown     Fentanyl Unknown     itchy     Hydroxyzine       Levofloxacin      Moviprep [Peg-Kcl-Nacl-Nasulf-Na Asc-C]      No Clinical Screening - See Comments      Oxycodone      Penicillins Unknown     itchy     Septra [Sulfamethoxazole-Trimethoprim] Unknown     itchy     Tramadol Unknown     itchy     Trimethoprim      Zyban [Bupropion] Unknown     Personality Change     Altex Rash     Cephalosporins Rash     Oxycodone-Acetaminophen Rash        Social History     Socioeconomic History     Marital status:      Spouse name: Daniel     Number of children: 2     Years of education: 13     Highest education level: Some college, no degree   Occupational History     Occupation: Retired   Tobacco Use     Smoking status: Former     Packs/day: 0.50     Years: 40.00     Pack years: 20.00     Types: Cigarettes     Quit date: 2022     Years since quittin.4     Smokeless tobacco: Never   Vaping Use     Vaping Use: Never used   Substance and Sexual Activity     Alcohol use: Yes     Comment: Occasional     Drug use: Never     Sexual activity: Yes     Partners: Male     Birth control/protection: Female Surgical   Other Topics Concern     Not on file   Social History Narrative     Not on file     Social Determinants of Health     Financial Resource Strain: Not on file   Food Insecurity: Not on file   Transportation Needs: Not on file   Physical Activity: Not on file   Stress: Not on file   Social Connections: Not on file   Intimate Partner Violence: Not on file   Housing Stability: Not on file       Family History   Problem Relation Age of Onset     Hypertension Mother      Thyroid Disease Mother      Arthritis Mother      Breast Cancer Mother 85        breast cancer     Hypertension Father      Heart Disease Father         Valve replacement ,bypass     Arthritis Father      Breast Cancer Maternal Grandmother         70s     Other - See Comments Maternal Grandmother         some sort blood clot.     Anxiety Disorder Brother      Obesity Brother      Rectal Cancer  "Brother      Depression Brother      Gout Son      Gout Son      Breast Cancer Paternal Aunt         40-50        Most Recent Immunizations   Administered Date(s) Administered     COVID-19 MONOVALENT 12+ (Pfizer) 02/22/2021     COVID-19 Monovalent 12+ (Pfizer 2022) 05/10/2022     Influenza Vaccine 50-64 or 18-64 w/egg allergy (Flublok) 11/20/2021     Influenza Vaccine >6 months (Alfuria,Fluzone) 09/15/2020     Pneumococcal 23 valent 09/15/2020     TDAP (Adacel,Boostrix) 11/18/2019     Twinrix A/B 11/28/2018     Typhoid IM 06/23/2023     Zoster recombinant adjuvanted (SHINGRIX) 06/01/2021     Zoster vaccine, live 03/20/2012        Wt Readings from Last 3 Encounters:   06/30/23 68 kg (150 lb)   06/19/23 67.6 kg (149 lb 2 oz)   03/27/23 69.1 kg (152 lb 6.4 oz)        BP Readings from Last 6 Encounters:   06/30/23 122/72   06/19/23 120/70   03/27/23 108/74   02/12/23 136/76   01/19/23 114/74   12/15/22 115/71        Hemoglobin A1C   Date Value Ref Range Status   10/07/2022 5.7 (H) 0.0 - 5.6 % Final     Comment:     Normal <5.7%   Prediabetes 5.7-6.4%    Diabetes 6.5% or higher     Note: Adopted from ADA consensus guidelines.            PHYSICAL EXAM:    /72   Pulse 77   Resp 18   Ht 1.6 m (5' 3\")   Wt 68 kg (150 lb)   LMP  (LMP Unknown)   SpO2 99%   BMI 26.57 kg/m       Gen. call patient no signs of distress        Answers for HPI/ROS submitted by the patient on 6/30/2023  If you checked off any problems, how difficult have these problems made it for you to do your work, take care of things at home, or get along with other people?: Very difficult  PHQ9 TOTAL SCORE: 10  TASHI 7 TOTAL SCORE: 4  What is the reason for your visit today? : medication review  How many servings of fruits and vegetables do you eat daily?: 2-3  On average, how many sweetened beverages do you drink each day (Examples: soda, juice, sweet tea, etc.  Do NOT count diet or artificially sweetened beverages)?: 0  How many minutes a day do you " exercise enough to make your heart beat faster?: 30 to 60  How many days a week do you exercise enough to make your heart beat faster?: 5  How many days per week do you miss taking your medication?: 0

## 2023-08-08 ENCOUNTER — OFFICE VISIT (OUTPATIENT)
Dept: FAMILY MEDICINE | Facility: CLINIC | Age: 63
End: 2023-08-08
Payer: MEDICARE

## 2023-08-08 VITALS
DIASTOLIC BLOOD PRESSURE: 67 MMHG | SYSTOLIC BLOOD PRESSURE: 112 MMHG | TEMPERATURE: 97.6 F | OXYGEN SATURATION: 96 % | HEART RATE: 74 BPM | RESPIRATION RATE: 15 BRPM

## 2023-08-08 DIAGNOSIS — N30.01 ACUTE CYSTITIS WITH HEMATURIA: Primary | ICD-10-CM

## 2023-08-08 LAB
ALBUMIN UR-MCNC: NEGATIVE MG/DL
APPEARANCE UR: ABNORMAL
BACTERIA #/AREA URNS HPF: ABNORMAL /HPF
BILIRUB UR QL STRIP: NEGATIVE
COLOR UR AUTO: YELLOW
GLUCOSE UR STRIP-MCNC: NEGATIVE MG/DL
HGB UR QL STRIP: ABNORMAL
KETONES UR STRIP-MCNC: NEGATIVE MG/DL
LEUKOCYTE ESTERASE UR QL STRIP: ABNORMAL
NITRATE UR QL: NEGATIVE
PH UR STRIP: 7 [PH] (ref 5–8)
RBC #/AREA URNS AUTO: ABNORMAL /HPF
SP GR UR STRIP: 1.02 (ref 1–1.03)
SQUAMOUS #/AREA URNS AUTO: ABNORMAL /LPF
UROBILINOGEN UR STRIP-ACNC: 0.2 E.U./DL
WBC #/AREA URNS AUTO: ABNORMAL /HPF

## 2023-08-08 PROCEDURE — 87086 URINE CULTURE/COLONY COUNT: CPT | Performed by: PHYSICIAN ASSISTANT

## 2023-08-08 PROCEDURE — 81001 URINALYSIS AUTO W/SCOPE: CPT | Performed by: PHYSICIAN ASSISTANT

## 2023-08-08 PROCEDURE — 99213 OFFICE O/P EST LOW 20 MIN: CPT | Performed by: PHYSICIAN ASSISTANT

## 2023-08-08 RX ORDER — NITROFURANTOIN 25; 75 MG/1; MG/1
100 CAPSULE ORAL 2 TIMES DAILY
Qty: 10 CAPSULE | Refills: 0 | Status: SHIPPED | OUTPATIENT
Start: 2023-08-08 | End: 2023-08-13

## 2023-08-08 NOTE — PROGRESS NOTES
Assessment & Plan:      Problem List Items Addressed This Visit    None  Visit Diagnoses       Acute cystitis with hematuria    -  Primary    Relevant Medications    nitroFURantoin macrocrystal-monohydrate (MACROBID) 100 MG capsule    Other Relevant Orders    UA Macroscopic with reflex to Microscopic and Culture - Clinic Collect (Completed)    Urine Microscopic Exam (Completed)    Urine Culture          Medical Decision Making  Patient presents with increased urinary frequency and dysuria for less than 24 hours.  Urinalysis shows signs concerning for acute cystitis.  Recommend oral antibiotics.  Continue to drink plenty of clear fluids.  Discussed treatment and symptomatic care.  Allergies and medication interactions reviewed.  Discussed signs of worsening symptoms and when to follow-up with PCP if no symptom improvement.     Subjective:      Trenton Milian is a 63 year old female here for evaluation of increased urinary frequency and dysuria.  Onset of symptoms was this morning.  Symptoms feel very similar to previous UTIs.  No recent UTIs.  Patient denies fevers, nausea, vomiting, new back pains.     The following portions of the patient's history were reviewed and updated as appropriate: allergies, current medications, and problem list.     Review of Systems  Pertinent items are noted in HPI.    Allergies  Allergies   Allergen Reactions    Adhesive Tape Itching    Amoxicillin-Pot Clavulanate Unknown     itchy    Clarithromycin     Clavulanic Acid     Clindamycin Unknown    Fentanyl Unknown     itchy    Hydroxyzine     Levofloxacin     Moviprep [Peg-Kcl-Nacl-Nasulf-Na Asc-C]     No Clinical Screening - See Comments     Oxycodone     Penicillins Unknown     itchy    Septra [Sulfamethoxazole-Trimethoprim] Unknown     itchy    Tramadol Unknown     itchy    Trimethoprim     Zyban [Bupropion] Unknown     Personality Change    Altex Rash    Cephalosporins Rash    Oxycodone-Acetaminophen Rash       Family History    Problem Relation Age of Onset    Hypertension Mother     Thyroid Disease Mother     Arthritis Mother     Breast Cancer Mother 85        breast cancer    Hypertension Father     Heart Disease Father         Valve replacement ,bypass    Arthritis Father     Breast Cancer Maternal Grandmother         70s    Other - See Comments Maternal Grandmother         some sort blood clot.    Anxiety Disorder Brother     Obesity Brother     Rectal Cancer Brother     Depression Brother     Gout Son     Gout Son     Breast Cancer Paternal Aunt         40-50       Social History     Tobacco Use    Smoking status: Every Day     Packs/day: 0.50     Years: 40.00     Pack years: 20.00     Types: Cigarettes    Smokeless tobacco: Never   Substance Use Topics    Alcohol use: Yes     Comment: Occasional        Objective:      /67   Pulse 74   Temp 97.6  F (36.4  C)   Resp 15   LMP  (LMP Unknown)   SpO2 96%   General appearance - alert, well appearing, and in no distress and non-toxic     Lab & Imaging Results    Results for orders placed or performed in visit on 08/08/23   UA Macroscopic with reflex to Microscopic and Culture - Clinic Collect     Status: Abnormal    Specimen: Urine, Clean Catch   Result Value Ref Range    Color Urine Yellow Colorless, Straw, Light Yellow, Yellow    Appearance Urine Cloudy (A) Clear    Glucose Urine Negative Negative mg/dL    Bilirubin Urine Negative Negative    Ketones Urine Negative Negative mg/dL    Specific Gravity Urine 1.020 1.005 - 1.030    Blood Urine Moderate (A) Negative    pH Urine 7.0 5.0 - 8.0    Protein Albumin Urine Negative Negative mg/dL    Urobilinogen Urine 0.2 0.2, 1.0 E.U./dL    Nitrite Urine Negative Negative    Leukocyte Esterase Urine Large (A) Negative   Urine Microscopic Exam     Status: Abnormal   Result Value Ref Range    Bacteria Urine Few (A) None Seen /HPF    RBC Urine 5-10 (A) 0-2 /HPF /HPF    WBC Urine 10-25 (A) 0-5 /HPF /HPF    Squamous Epithelials Urine Few (A)  None Seen /LPF       I personally reviewed these results and discussed findings with the patient.    The use of [employer] dictation services was used to construct the content of this note; any grammatical errors are non-intentional. Please contact the author directly if you are in need of any clarification.

## 2023-08-08 NOTE — PATIENT INSTRUCTIONS
"Analysis of your urine showed signs of a urinary tract infection.     Take the prescribed antibiotics for the entire course, even if symptoms improve.  You should expect improvement to begin in 24 hours. In the meantime, continue to drink plenty of fluids.  Recommend daily use of a probiotic while taking prescribed medication (a common brand is Culturelle, yogurt with \"active cultures\" are also appropriate).    Reasons to come back for re-evaluation:  - Develop a fever, back or flank pain, or nausea and vomiting  - If symptoms have not completely improved after 72 hours  - Recurrent symptoms within a few weeks after treatment has concluded  "

## 2023-08-09 LAB — BACTERIA UR CULT: NORMAL

## 2023-08-10 ENCOUNTER — E-VISIT (OUTPATIENT)
Dept: FAMILY MEDICINE | Facility: CLINIC | Age: 63
End: 2023-08-10
Payer: MEDICARE

## 2023-08-10 DIAGNOSIS — N39.0 ACUTE UTI (URINARY TRACT INFECTION): Primary | ICD-10-CM

## 2023-08-10 PROCEDURE — 99421 OL DIG E/M SVC 5-10 MIN: CPT | Performed by: FAMILY MEDICINE

## 2023-08-10 RX ORDER — NITROFURANTOIN 25; 75 MG/1; MG/1
100 CAPSULE ORAL 2 TIMES DAILY
Qty: 10 CAPSULE | Refills: 0 | Status: SHIPPED | OUTPATIENT
Start: 2023-08-10 | End: 2023-08-15

## 2023-08-11 ENCOUNTER — HOSPITAL ENCOUNTER (OUTPATIENT)
Dept: MAMMOGRAPHY | Facility: CLINIC | Age: 63
Discharge: HOME | End: 2023-08-11
Attending: FAMILY MEDICINE | Admitting: FAMILY MEDICINE
Payer: MEDICARE

## 2023-08-11 ENCOUNTER — MYC MEDICAL ADVICE (OUTPATIENT)
Dept: FAMILY MEDICINE | Facility: CLINIC | Age: 63
End: 2023-08-11

## 2023-08-11 DIAGNOSIS — D68.62 LUPUS ANTICOAGULANT DISORDER (H): ICD-10-CM

## 2023-08-11 DIAGNOSIS — Z12.31 VISIT FOR SCREENING MAMMOGRAM: ICD-10-CM

## 2023-08-11 PROCEDURE — 77067 SCR MAMMO BI INCL CAD: CPT

## 2023-08-11 NOTE — PATIENT INSTRUCTIONS
Dear Trenton Milian    After reviewing your responses, I've been able to diagnose you with a urinary tract infection, which is a common infection of the bladder with bacteria.  This is not a sexually transmitted infection, though urinating immediately after intercourse can help prevent infections.  Drinking lots of fluids is also helpful to clear your current infection and prevent the next one.      I have sent a prescription for antibiotics to your pharmacy to treat this infection.    It is important that you take all of your prescribed medication even if your symptoms are improving after a few doses.  Taking all of your medicine helps prevent the symptoms from returning.     If your symptoms worsen, you develop pain in your back or stomach, develop fevers, or are not improving in 5 days, please contact your primary care provider for an appointment or visit any of our convenient Walk-in or Urgent Care Centers to be seen, which can be found on our website here.    Thanks again for choosing us as your health care partner,    Pancho Mckeon MD, MD

## 2023-08-17 RX ORDER — RIVAROXABAN 20 MG/1
20 TABLET, FILM COATED ORAL DAILY
Qty: 30 TABLET | Refills: 0 | Status: SHIPPED | OUTPATIENT
Start: 2023-08-17 | End: 2023-09-03

## 2023-08-18 ENCOUNTER — TRANSFERRED RECORDS (OUTPATIENT)
Dept: HEALTH INFORMATION MANAGEMENT | Facility: CLINIC | Age: 63
End: 2023-08-18
Payer: MEDICARE

## 2023-09-03 ENCOUNTER — MYC REFILL (OUTPATIENT)
Dept: FAMILY MEDICINE | Facility: CLINIC | Age: 63
End: 2023-09-03
Payer: MEDICARE

## 2023-09-03 ENCOUNTER — MYC MEDICAL ADVICE (OUTPATIENT)
Dept: FAMILY MEDICINE | Facility: CLINIC | Age: 63
End: 2023-09-03
Payer: MEDICARE

## 2023-09-03 DIAGNOSIS — F41.9 ANXIETY: ICD-10-CM

## 2023-09-03 DIAGNOSIS — D68.62 LUPUS ANTICOAGULANT DISORDER (H): ICD-10-CM

## 2023-09-03 DIAGNOSIS — Z91.09 ENVIRONMENTAL ALLERGIES: ICD-10-CM

## 2023-09-03 DIAGNOSIS — G25.81 RESTLESS LEG SYNDROME: Primary | ICD-10-CM

## 2023-09-04 RX ORDER — RIVAROXABAN 20 MG/1
20 TABLET, FILM COATED ORAL DAILY
Qty: 30 TABLET | Refills: 6 | Status: SHIPPED | OUTPATIENT
Start: 2023-09-04 | End: 2023-12-28

## 2023-09-04 NOTE — TELEPHONE ENCOUNTER
Routing refill request to provider for review/approval because:  Drug not on the [facility] refill protocol   Pt reported/historical     Last Written Prescription Date:  NA  Last Fill Quantity: NA,  # refills: NA   Last office visit provider:  6/30/23     Requested Prescriptions   Pending Prescriptions Disp Refills    gabapentin (NEURONTIN) 300 MG capsule         There is no refill protocol information for this order          Dee Dee Schumacher, KEITH 09/03/23 8:52 PM

## 2023-09-04 NOTE — TELEPHONE ENCOUNTER
Please confirm all dosing information with patient does I have not prescribed this medication for her.

## 2023-09-04 NOTE — TELEPHONE ENCOUNTER
Routing refill request to provider for review/approval because:  Labs not current:  [facility]    Last Written Prescription Date:  8/17/23  Last Fill Quantity: 30,  # refills: 0   Last office visit provider:   6/30/23    Requested Prescriptions   Pending Prescriptions Disp Refills    XARELTO ANTICOAGULANT 20 MG TABS tablet 30 tablet 0     Sig: Take 1 tablet (20 mg) by mouth daily       Direct Oral Anticoagulant Agents Failed - 9/3/2023  8:07 PM        Failed - Creatinine Clearance greater than 50 ml/min on file in past 3 mos     No lab results found.          Passed - Normal Platelets on file in past 12 months     Recent Labs   Lab Test 03/27/23  0858     281               Passed - Medication is active on med list        Passed - Serum creatinine less than or equal to 1.4 on file in past 3 mos     Recent Labs   Lab Test 06/19/23  1332   CR 0.77       Ok to refill medication if creatinine is low          Passed - Patient is 18 years of age or older        Passed - No active pregnancy on record        Passed - No positive pregnancy test within past 12 months        Passed - Recent (6 mo) or future (30 days) visit within the authorizing provider's specialty             Dee Dee Schumacher RN 09/03/23 8:37 PM

## 2023-09-05 RX ORDER — CETIRIZINE HYDROCHLORIDE 10 MG/1
10 TABLET ORAL DAILY
Qty: 90 TABLET | Refills: 3 | Status: SHIPPED | OUTPATIENT
Start: 2023-09-05 | End: 2023-09-12

## 2023-09-05 RX ORDER — DIAZEPAM 10 MG
10 TABLET ORAL EVERY 6 HOURS PRN
Qty: 15 TABLET | Refills: 0 | Status: SHIPPED | OUTPATIENT
Start: 2023-09-05 | End: 2023-12-28

## 2023-09-05 NOTE — TELEPHONE ENCOUNTER
"Routing refill request to provider for review/approval because:  Age    Last Written Prescription Date:  5/7/22  Last Fill Quantity: 90,  # refills: 3   Last office visit provider:  6/30/23     Requested Prescriptions   Pending Prescriptions Disp Refills    cetirizine (ZYRTEC) 10 MG tablet 90 tablet 3     Sig: Take 1 tablet (10 mg) by mouth daily       Antihistamines Protocol Failed - 9/5/2023  1:20 PM        Failed - Patient is 3-64 years of age     Apply weight-based dosing for peds patients age 3 - 12 years of age.    Forward request to provider for patients under the age of 3 or over the age of 64.          Passed - Recent (12 mo) or future (30 days) visit within the authorizing provider's specialty     Patient has had an office visit with the authorizing provider or a provider within the authorizing providers department within the previous 12 mos or has a future within next 30 days. See \"Patient Info\" tab in inbasket, or \"Choose Columns\" in Meds & Orders section of the refill encounter.              Passed - Medication is active on med list             SOLO PHELPS RN 09/05/23 1:21 PM  "

## 2023-09-05 NOTE — TELEPHONE ENCOUNTER
Medication last filled:     Last clinic visit: 6/30/2023    Clinic visit frequency required: Q 6  months    Next clinic visit: N/A    Controlled substance agreement on file: Yes:  Date 6/28/2022.    Urine Drug Screen on file:  No    Pending Prescriptions:                       Disp   Refills    diazepam (VALIUM) 10 MG tablet            15 tab*0            Sig: Take 1 tablet (10 mg) by mouth every 6 hours as           needed for anxiety

## 2023-09-10 RX ORDER — GABAPENTIN 300 MG/1
300 CAPSULE ORAL DAILY
Qty: 90 CAPSULE | Refills: 1 | Status: SHIPPED | OUTPATIENT
Start: 2023-09-10

## 2023-09-11 NOTE — PATIENT INSTRUCTIONS - HE
"Analysis of your urine showed signs of a urinary tract infection.     Take the prescribed antibiotics for the entire course, even if symptoms improve.  You should expect improvement to begin in 24 hours. In the meantime, continue to drink plenty of fluids.  Recommend daily use of a probiotic while taking prescribed medication (a common brand is Culturelle, yogurt with \"active cultures\" are also appropriate).    Reasons to come back for re-evaluation:  - Develop a fever, back or flank pain, or nausea and vomiting  - If symptoms have not completely improved after 72 hours  - Recurrent symptoms within a few weeks after treatment has concluded  " Yes

## 2023-09-12 ENCOUNTER — TRANSFERRED RECORDS (OUTPATIENT)
Dept: HEALTH INFORMATION MANAGEMENT | Facility: CLINIC | Age: 63
End: 2023-09-12
Payer: MEDICARE

## 2023-10-05 NOTE — PROGRESS NOTES
Progress Notes by Chacorta Layton MD at 9/15/2020  8:20 AM     Author: Chacorta Layton MD Service: -- Author Type: Physician    Filed: 9/15/2020  6:23 PM Encounter Date: 9/15/2020 Status: Signed    : Chacorta Layton MD (Physician)       FEMALE PREVENTATIVE EXAM    Assessment and Plan:       1. Routine general medical examination at a health care facility  - Lipid Cascade  - Comprehensive Metabolic Panel  - HM2(CBC w/o Differential)    2. Need for influenza vaccination    3. Chronic gastritis without bleeding, unspecified gastritis type  - omeprazole (PRILOSEC) 20 MG capsule; Take 1 capsule (20 mg total) by mouth daily before breakfast.  Dispense: 90 capsule; Refill: 1  - Ambulatory referral to Gastroenterology    4. Anxiety and depression  - sertraline (ZOLOFT) 25 MG tablet; Take 1 tablet (25 mg total) by mouth daily.  Dispense: 90 tablet; Refill: 1    5. Muscle pain  - VOLTAREN 1 % Gel; Apply 2 g topically 2 (two) times a day as needed (to Feet).  Dispense: 150 g; Refill: 2    6. Environmental allergies  - cetirizine (ZYRTEC) 10 MG tablet; Take 1 tablet (10 mg total) by mouth daily.  Dispense: 90 tablet; Refill: 1    7. Need for hepatitis C screening test  - Hepatitis C Antibody (Anti-HCV)    8. Need for pneumococcal vaccination  - Pneumococcal polysaccharide vaccine 23-valent 3 yo or older, subq/IM    9. Vitamin D deficiency  - Vitamin D, Total (25-Hydroxy)    Discussed her health maintenance needs, also discussed her other medical concerns.  Reviewed medications and did a refill medication that is needed.  Did discuss health maintenance.  She does have hysterectomy, is up-to-date in mammogram though we will need to discuss if she needs to have MRI because of her recent diagnosis of her mother's breast cancer at 85 years old.  Immunizations were also put in and will be updated.  She does continue to be physically active and exercising.  Takes calcium and vitamin D.   Continues to smoke but we did  regarding cessation and offered help.    Next follow up:  No follow-ups on file.    Immunization Review  Adult Imm Review: Due today, orders placed  Documented tobacco use.  Website and phone contact for [employer] given to patient in AVS.    I discussed the following with the patient:   Adult Healthy Living: Importance of regular exercise  Healthy nutrition  Getting adequate sleep  Stress management        Subjective:   Chief Complaint: Trenton Milian is an 60 y.o. female here for a preventative health visit.     HPI: 60 years old female who comes in for her annual physical exam.  She does not really have any major concerns today but needed to review her other medical concerns.  Has been to the gastroenterology for chronic gastritis as well as esophageal stricture and colonoscopy.  Has also been to see the OB/GYN for vaginal pain and dryness in the uses an over the counter cream at this time.  She does have a history of anxiety has been taking for public works 25mg sertraline which has been helping her.  Needed to get a refill for that. The other medical concerns where noted and reviewed and she is a stable regarding majority of them including anxiety with depression.  She also noted to be followed up the gastroenterology and I did put in a referral for her.      Healthy Habits  Are you taking a daily aspirin? No  Do you typically exercising at least 40 min, 3-4 times per week?  Yes  Do you usually eat at least 4 servings of fruit and vegetables a day, include whole grains and fiber and avoid regularly eating high fat foods? no  Have you had an eye exam in the past two years? Yes  Do you see a dentist twice per year? Yes  Do you have any concerns regarding sleep? No    Safety Screen  If you own firearms, are they secured in a locked gun cabinet or with trigger locks? Yes  Do you feel you are safe where you are living?: Yes (12/13/2019  6:58 AM)  Do you feel you are safe in  "your relationship(s)?: Yes (12/13/2019  6:58 AM)      Review of Systems:    Constitutional:Denied any fatigue no fevers no chills.  Has good appetite.  HEENT: Does not have any neck pain.  No difficulty swallowing denies having any postnasal drips.    Respiratory: There is no cough.  No chest wall pain.  Cardiovascular: Denied chest pain shortness of breath or palpitations.  There is no notable lower extremity swelling.    Gastrointestinal: Denies nausea vomiting.  No abdominal pain no diarrhea or constipation.  Endocrine:Has no sensitivity to cold or heat.  Denied undue thirst.   Genitourinary:Has no urinary symptoms, no nocturia.  Musculoskeletal: There is no musculoskeletal pain and swelling.    Skin: He does not have any rashes.   Allergic/Immunologic: Negative.   Neurological: No Numbness  Hematological: Negative.   Psychiatric/Behavioral: No anxiety or depression symptoms.  Please see above.  The rest of the review of systems are negative for all systems.       Cancer Screening       Status Date      MAMMOGRAM Next Due 1/31/2022      Done 1/31/2020 MAMMO SCREENING BILATERAL    PAP SMEAR This plan is no longer active.           Patient Care Team:  Chacorta Layton MD as PCP - General (Family Medicine)  Chacorta Layton MD as Assigned PCP        History     Reviewed By Date/Time Sections Reviewed    Chacorta Layton MD 9/15/2020  8:38 AM Medical, Surgical, Tobacco, Alcohol, Drug Use, Sexual Activity, Family    Chacorta Layton MD 9/15/2020  8:35 AM Medical, Surgical, Tobacco, Alcohol, Drug Use, Sexual Activity, Family    Apoorva Marcelino [facility] 9/15/2020  8:15 AM Tobacco               Vitals:    09/15/20 0815   BP: 110/60   Pulse: (!) 105   Temp: 98.4  F (36.9  C)   TempSrc: Oral   Weight: 140 lb 9.6 oz (63.8 kg)   Height: 5' 3.5\" (1.613 m)     Body mass index is 24.52 kg/m .    Physical Exam:  General Appearance: Alert, cooperative, no distress, appears stated " age  Head: Normocephalic, without obvious abnormality, atraumatic  Eyes: PERRL, conjunctiva/corneas clear, EOM's intact  Ears: Normal TM's and external ear canals, both ears  Nose: Nares normal, septum midline,mucosa normal, no drainage  Throat: Lips, mucosa, and tongue normal; teeth and gums normal  Neck: Supple, symmetrical, trachea midline, no adenopathy;  thyroid: not enlarged, symmetric, no tenderness/mass/nodules; no carotid bruit or JVD  Back: Symmetric, no curvature, ROM normal, no CVA tenderness  Lungs: Clear to auscultation bilaterally, respirations unlabored  Heart: Regular rate and rhythm, S1 and S2 normal, no murmur, rub, or gallop.  Abdomen: Soft, non-tender, bowel sounds active all four quadrants,  no masses, no organomegaly  Pelvic:Not examined  Extremities: Extremities normal, atraumatic, no cyanosis or edema  Skin: Skin color, texture, turgor normal, no rashes or lesions  Lymph nodes: Cervical, supraclavicular, and axillary nodes normal  Neurologic: Normal            The 10-year ASCVD risk score ([place] DC Jr., et al., 2013) is: 7.4%    Values used to calculate the score:      Age: 60 years      Sex: Female      Is Non- : No      Diabetic: No      Tobacco smoker: Yes      Systolic Blood Pressure: 110 mmHg      Is BP treated: No      HDL Cholesterol: 60 mg/dL      Total Cholesterol: 295 mg/dL         Medication List          Accurate as of September 15, 2020  9:12 AM. If you have any questions, ask your nurse or doctor.            CHANGE how you take these medications    omeprazole 20 MG capsule  Also known as:  PriLOSEC  INSTRUCTIONS:  Take 1 capsule (20 mg total) by mouth daily before breakfast.  What changed:  when to take this  Changed by:  Chacorta Layton MD        sertraline 25 MG tablet  Also known as:  ZOLOFT  INSTRUCTIONS:  Take 1 tablet (25 mg total) by mouth daily.  What changed:      how much to take    how to take this    when to take this  Changed by:   Chacorta Layton MD        Voltaren 1 % Gel  INSTRUCTIONS:  Apply 2 g topically 2 (two) times a day as needed (to Feet).  Generic drug:  diclofenac sodium  What changed:      how much to take    how to take this    when to take this    reasons to take this  Changed by:  Chacorta Layton MD           CONTINUE taking these medications    calcium carbonate-vitamin D3 600 mg(1,500mg) -400 unit per tablet  Also known as:  CALTRATE 600 PLUS D3        CeraVe Crea cream  Generic drug:  ceramides 1,3,6-11        cetirizine 10 MG tablet  Also known as:  ZyrTEC  INSTRUCTIONS:  Take 1 tablet (10 mg total) by mouth daily.        diazePAM 10 MG tablet  Also known as:  VALIUM  INSTRUCTIONS:  TAKE 1/2 TO 1 TABLET(5 TO 10 MG) BY MOUTH AT BEDTIME AS NEEDED        gabapentin 300 MG capsule  Also known as:  NEURONTIN  INSTRUCTIONS:  300 mg.   Reason for med:  restless legs syndrome, an extreme discomfort in the calf muscles when sitting or lying down        Mucinex 600 mg 12 hr tablet  Generic drug:  guaiFENesin ER        ProAir HFA 90 mcg/actuation inhaler  Doctor's comments:  May substitute the equivalent medication per insurance preference.  Generic drug:  albuterol        Xarelto 20 mg tablet  INSTRUCTIONS:  20 mg once.   Generic drug:  rivaroxaban ANTICOAGULANT           STOP taking these medications    hydrOXYzine HCL 25 MG tablet  Also known as:  ATARAX  Stopped by:  Chacorta Layton MD     traMADoL 50 mg tablet  Also known as:  ULTRAM  Stopped by:  Chacorta Layton MD           Where to Get Your Medications      These medications were sent to [email or web address] [address] [address]    Phone:  [phone]     cetirizine 10 MG tablet    omeprazole 20 MG capsule    sertraline 25 MG tablet    Voltaren 1 % Gel         Additional Screenings Completed Today:           Taltz Counseling: I discussed with the patient the risks of ixekizumab including but not limited to immunosuppression, serious infections, worsening of inflammatory bowel disease and drug reactions.  The patient understands that monitoring is required including a PPD at baseline and must alert us or the primary physician if symptoms of infection or other concerning signs are noted.

## 2023-10-07 NOTE — PROGRESS NOTES
"       [facility]   [address], [place] [address]   [email or web address]   OFFICE: [phone]          Thank you Dr. Layton for asking the [facility] team to participate in the care of your patient, Trenton Milian.     Impression and Plan     1.  Chest discomfort.  Certain features of Vanessa's chest discomfort are a bit atypical for cardiac etiology, ischemic or otherwise.  It is not necessarily provoked with exertion.  Nonetheless, she does have some risk factors for coronary disease including smoking history, early onset coronary disease in her father who had concomitant valve replacement with bypass at age 65-70, as well as some history of elevated lipids.  Cannot rule out possible ischemic contribution to some of her symptoms.  Plan:    Stress echocardiogram.        2.  Tobacco abuse.  This was reviewed with Vanessa.  Importance of smoking cessation was discussed with patient.  She states presently that she is somewhat reluctant to fully quit smoking out of concern of weight gain.  Nonetheless, we did discuss how this can influence development of atherosclerosis.    Further recommendations and follow-up pending stress echocardiographic findings.    30 minutes spent reviewing prior records (including documentation, laboratory studies, cardiac testing/imaging), interview with patient along with physical exam, planning, and subsequent documentation/crafting of note.       History of Present Illness    Once again I would like to thank you again for asking me to participate in the care of your patient, Trenton Milian.  As you know, but to reiterate for my own records, Trenton Milian is a 61 year old female who has been having a variety of different symptoms, but also chest discomfort.  Patient states that she has suffered from some relatively chronic type fatigue symptoms.  She also reports some the sensation that her legs feel like \"wood\" at times " Problem: Nutrition/Hydration-ADULT  Goal: Nutrient/Hydration intake appropriate for improving, restoring or maintaining nutritional needs  Description: Monitor and assess patient's nutrition/hydration status for malnutrition. Collaborate with interdisciplinary team and initiate plan and interventions as ordered. Monitor patient's weight and dietary intake as ordered or per policy. Utilize nutrition screening tool and intervene as necessary. Determine patient's food preferences and provide high-protein, high-caloric foods as appropriate.      INTERVENTIONS:  - Monitor oral intake, urinary output, labs, and treatment plans  - Assess nutrition and hydration status and recommend course of action  - Evaluate amount of meals eaten  - Assist patient with eating if necessary   - Allow adequate time for meals  - Recommend/ encourage appropriate diets, oral nutritional supplements, and vitamin/mineral supplements  - Order, calculate, and assess calorie counts as needed  - Recommend, monitor, and adjust tube feedings and TPN/PPN based on assessed needs  - Assess need for intravenous fluids  - Provide specific nutrition/hydration education as appropriate  - Include patient/family/caregiver in decisions related to nutrition  Outcome: Progressing     Problem: PAIN - ADULT  Goal: Verbalizes/displays adequate comfort level or baseline comfort level  Description: Interventions:  - Encourage patient to monitor pain and request assistance  - Assess pain using appropriate pain scale  - Administer analgesics based on type and severity of pain and evaluate response  - Implement non-pharmacological measures as appropriate and evaluate response  - Consider cultural and social influences on pain and pain management  - Notify physician/advanced practitioner if interventions unsuccessful or patient reports new pain  Outcome: Progressing     Problem: INFECTION - ADULT  Goal: Absence or prevention of progression during hospitalization  Description: INTERVENTIONS:  - Assess and monitor for signs and symptoms of infection  - Monitor lab/diagnostic results  - Monitor all insertion sites, i.e. indwelling lines, tubes, and drains  - Monitor endotracheal if appropriate and nasal secretions for changes in amount and color  - [place] appropriate cooling/warming therapies per order  - Administer medications as ordered  - Instruct and encourage patient and family to use good hand hygiene technique  - Identify and instruct in appropriate isolation precautions for identified infection/condition  Outcome: Progressing  Goal: Absence of fever/infection during neutropenic period  Description: INTERVENTIONS:  - Monitor WBC    Outcome: Progressing     Problem: SAFETY ADULT  Goal: Patient will remain free of falls  Description: INTERVENTIONS:  - Educate patient/family on patient safety including physical limitations  - Instruct patient to call for assistance with activity   - Consult OT/PT to assist with strengthening/mobility   - Keep Call bell within reach  - Keep bed low and locked with side rails adjusted as appropriate  - Keep care items and personal belongings within reach  - Initiate and maintain comfort rounds  - Make Fall Risk Sign visible to staff  - Apply yellow socks and bracelet for high fall risk patients  - Consider moving patient to room near nurses station  Outcome: Progressing  Goal: Maintain or return to baseline ADL function  Description: INTERVENTIONS:  -  Assess patient's ability to carry out ADLs; assess patient's baseline for ADL function and identify physical deficits which impact ability to perform ADLs (bathing, care of mouth/teeth, toileting, grooming, dressing, etc.)  - Assess/evaluate cause of self-care deficits   - Assess range of motion  - Assess patient's mobility; develop plan if impaired  - Assess patient's need for assistive devices and provide as appropriate  - Encourage maximum independence but intervene and though despite this can actually exercise fairly well on a regular basis.  She was found to have low iron and is having iron supplementation not only enterally, but IV.    She does report a chest pain sensation which is in the central sternal region and radiates toward the back.  This is rather sporadic, however.  It is not necessarily provoked with exertion in a consistent fashion.  She reports some rare palpitations, but no era lightheadedness and the like.  She denies any fevers, chills, or other constitutional symptoms.    Further review of systems is otherwise negative/noncontributory (medical record and 13 point review of systems reviewed as well and pertinent positives noted).       Physical Examination       /60 (BP Location: Left arm, Patient Position: Sitting, Cuff Size: Adult Regular)   Pulse 68   Resp 16   Wt 64.4 kg (142 lb)   BMI 25.15 kg/m          Wt Readings from Last 3 Encounters:   11/08/21 64.4 kg (142 lb)   10/20/21 65.5 kg (144 lb 6.4 oz)   09/22/21 67.1 kg (148 lb)     The patient is alert and oriented times three. Sclerae are anicteric. Mucosal membranes are moist. Jugular venous pressure is normal. No significant adenopathy/thyromegally appreciated. Lungs are clear with good expansion. On cardiovascular exam, the patient has a regular S1 and S2. Abdomen is soft and non-tender. Extremities reveal no clubbing, cyanosis, or edema.       Imaging     Lower extremity Dopplers 22 June 2021:  1. No deep venous thrombosis in the left lower extremity.        Family History/Social History/Risk Factors   Patient does smoke.  Family history reviewed, and family history includes Anxiety Disorder in her brother; Arthritis in her father and mother; Breast Cancer in her maternal grandmother and paternal aunt; Breast Cancer (age of onset: 85.00) in her mother; Colon Cancer in her brother; Gout in her son and son; Heart Disease in her father; Hypertension in her father and mother; Obesity in her  brother; Other - See Comments in her maternal grandmother; Thyroid Disease in her mother.        Medical History  Surgical History Family History Social History   Past Medical History:   Diagnosis Date     Asthma      Depression      Environmental allergies      Esophageal stricture 09/15/2014    EGD done at [facility].     Gastric ulcer 09/15/2014    EGD done at [facility].     GERD (gastroesophageal reflux disease)      Left leg DVT (H)      Lupus (H)      Past Surgical History:   Procedure Laterality Date     ABDOMINOPLASTY       APPENDECTOMY        SECTION       ESOPHAGOSCOPY, GASTROSCOPY, DUODENOSCOPY (EGD), COMBINED  09/15/2014    Esophageal stricture and gastric ulceration.     EXCISION / CURETTAGE BONE CYST PHALANGES OF FOOT Left      HYSTERECTOMY TOTAL ABDOMINAL, BILATERAL SALPINGO-OOPHORECTOMY, COMBINED      for ovarian infection.     MANDIBLE SURGERY      lower jaw advancement. Left over numbness of tongue and gums.Totally 5 surgeries.     OVARIAN CYST SURGERY Right 1977     SINUS SURGERY Bilateral      Family History   Problem Relation Age of Onset     Hypertension Mother      Thyroid Disease Mother      Arthritis Mother      Breast Cancer Mother 85.00        breast cancer     Hypertension Father      Heart Disease Father         Valve replacement ,bypass     Arthritis Father      Gout Son      Breast Cancer Maternal Grandmother      Other - See Comments Maternal Grandmother         some sort blood clot.     Breast Cancer Paternal Aunt      Anxiety Disorder Brother      Obesity Brother      Colon Cancer Brother         Rectal Cancer.     Gout Son         Social History     Socioeconomic History     Marital status:      Spouse name: Not on file     Number of children: Not on file     Years of education: Not on file     Highest education level: Not on file   Occupational History     Not on file   Tobacco Use     Smoking status: Current  supervise when necessary  - Involve family in performance of ADLs  - Assess for home care needs following discharge   - Consider OT consult to assist with ADL evaluation and planning for discharge  - Provide patient education as appropriate  Outcome: Progressing  Goal: Maintains/Returns to pre admission functional level  Description: INTERVENTIONS:  - Perform BMAT or MOVE assessment daily.   - Set and communicate daily mobility goal to care team and patient/family/caregiver. - Collaborate with rehabilitation services on mobility goals if consulted  - Out of bed for toileting  - Record patient progress and toleration of activity level   Outcome: Progressing     Problem: DISCHARGE PLANNING  Goal: Discharge to home or other facility with appropriate resources  Description: INTERVENTIONS:  - Identify barriers to discharge w/patient and caregiver  - Arrange for needed discharge resources and transportation as appropriate  - Identify discharge learning needs (meds, wound care, etc.)  - Arrange for interpretive services to assist at discharge as needed  - Refer to Case Management Department for coordinating discharge planning if the patient needs post-hospital services based on physician/advanced practitioner order or complex needs related to functional status, cognitive ability, or social support system  Outcome: Progressing     Problem: Knowledge Deficit  Goal: Patient/family/caregiver demonstrates understanding of disease process, treatment plan, medications, and discharge instructions  Description: Complete learning assessment and assess knowledge base.   Interventions:  - Provide teaching at level of understanding  - Provide teaching via preferred learning methods  Outcome: Progressing Every Day Smoker     Packs/day: 0.50     Years: 40.00     Pack years: 20.00     Types: Cigarettes     Smokeless tobacco: Never Used   Substance and Sexual Activity     Alcohol use: Never     Drug use: Never     Sexual activity: Yes     Partners: Male   Other Topics Concern     Not on file   Social History Narrative     Not on file     Social Determinants of Health     Financial Resource Strain: Not on file   Food Insecurity: Not on file   Transportation Needs: Not on file   Physical Activity: Not on file   Stress: Not on file   Social Connections: Not on file   Intimate Partner Violence: Not on file   Housing Stability: Not on file           Medications  Allergies   Current Outpatient Medications   Medication Sig Dispense Refill     calcium carbonate-vitamin D3 (CALTRATE 600 PLUS D3) 600 mg(1,500mg) -400 unit per tablet [CALCIUM CARBONATE-VITAMIN D3 (CALTRATE 600 PLUS D3) 600 MG(1,500MG) -400 UNIT PER TABLET]        CERAVE Crea cream [CERAVE CREA CREAM]        cetirizine (ZYRTEC) 10 MG tablet Take 1 tablet (10 mg) by mouth daily 90 tablet 2     diazePAM (VALIUM) 10 MG tablet [DIAZEPAM (VALIUM) 10 MG TABLET] TAKE 1/2 TO 1 TABLET(5 TO 10 MG) BY MOUTH AT BEDTIME AS NEEDED 30 tablet 5     gabapentin (NEURONTIN) 300 MG capsule Take 1-2 capsules (300-600 mg) by mouth daily as needed for neuropathic pain 180 capsule 3     LINZESS 72 MCG capsule Take 1 capsule (72 mcg) by mouth every morning (before breakfast) 90 capsule 2     MUCINEX 600 mg 12 hr tablet [MUCINEX 600 MG 12 HR TABLET]        omeprazole (PRILOSEC) 20 MG DR capsule TAKE 1 CAPSULE DAILY BEFORE BREAKFAST 90 capsule 2     PROAIR HFA 90 mcg/actuation inhaler [PROAIR HFA 90 MCG/ACTUATION INHALER]        sertraline (ZOLOFT) 25 MG tablet [SERTRALINE (ZOLOFT) 25 MG TABLET] Take 1 tablet (25 mg total) by mouth daily. 90 tablet 3     traZODone (DESYREL) 50 MG tablet Take 1 tablet (50 mg) by mouth At Bedtime 90 tablet 1     triamcinolone (KENALOG) 0.1 % external cream  Apply topically 2 times daily 80 g 1     VOLTAREN 1 % Gel [VOLTAREN 1 % GEL] Apply 2 g topically 2 (two) times a day as needed (to Feet). 150 g 2     XARELTO 20 mg tablet [XARELTO 20 MG TABLET] Take 1 tablet (20 mg total) by mouth daily. 90 tablet 2       Allergies   Allergen Reactions     Augmentin Unknown     itchy     Clindamycin Unknown     Fentanyl Unknown     itchy     Hydroxyzine      Penicillins Unknown     itchy     Septra [Sulfamethoxazole W/Trimethoprim] Unknown     itchy     Tramadol Unknown     itchy     Zyban [Bupropion] Unknown     Personality Change          Lab Results    Chemistry/lipid CBC Cardiac Enzymes/BNP/TSH/INR   Recent Labs   Lab Test 10/20/21  1007   CHOL 240*   HDL 46*   *   TRIG 179*     Recent Labs   Lab Test 10/20/21  1007 09/15/20  0919   * 207*     Recent Labs   Lab Test 10/20/21  1007      POTASSIUM 4.9   CHLORIDE 108*   CO2 23   GLC 97   BUN 14   CR 0.82   GFRESTIMATED 76   TRICIA 9.3     Recent Labs   Lab Test 10/20/21  1007 09/15/20  0919 12/17/19  1429   CR 0.82 0.90 0.9     No results for input(s): A1C in the last 65239 hours.       Recent Labs   Lab Test 10/20/21  1007   WBC 10.0   HGB 15.2   HCT 45.5   *        Recent Labs   Lab Test 10/20/21  1007 06/01/21  1230 09/15/20  0919   HGB 15.2 14.5 16.3*    No results for input(s): TROPONINI in the last 83800 hours.  No results for input(s): BNP, NTBNPI, NTBNP in the last 58004 hours.  No results for input(s): TSH in the last 65218 hours.  No results for input(s): INR in the last 35522 hours.       Medications  Allergies   Current Outpatient Medications   Medication Sig Dispense Refill     calcium carbonate-vitamin D3 (CALTRATE 600 PLUS D3) 600 mg(1,500mg) -400 unit per tablet [CALCIUM CARBONATE-VITAMIN D3 (CALTRATE 600 PLUS D3) 600 MG(1,500MG) -400 UNIT PER TABLET]        CERAVE Crea cream [CERAVE CREA CREAM]        cetirizine (ZYRTEC) 10 MG tablet Take 1 tablet (10 mg) by mouth daily 90 tablet 2      diazePAM (VALIUM) 10 MG tablet [DIAZEPAM (VALIUM) 10 MG TABLET] TAKE 1/2 TO 1 TABLET(5 TO 10 MG) BY MOUTH AT BEDTIME AS NEEDED 30 tablet 5     gabapentin (NEURONTIN) 300 MG capsule Take 1-2 capsules (300-600 mg) by mouth daily as needed for neuropathic pain 180 capsule 3     LINZESS 72 MCG capsule Take 1 capsule (72 mcg) by mouth every morning (before breakfast) 90 capsule 2     MUCINEX 600 mg 12 hr tablet [MUCINEX 600 MG 12 HR TABLET]        omeprazole (PRILOSEC) 20 MG DR capsule TAKE 1 CAPSULE DAILY BEFORE BREAKFAST 90 capsule 2     PROAIR HFA 90 mcg/actuation inhaler [PROAIR HFA 90 MCG/ACTUATION INHALER]        sertraline (ZOLOFT) 25 MG tablet [SERTRALINE (ZOLOFT) 25 MG TABLET] Take 1 tablet (25 mg total) by mouth daily. 90 tablet 3     traZODone (DESYREL) 50 MG tablet Take 1 tablet (50 mg) by mouth At Bedtime 90 tablet 1     triamcinolone (KENALOG) 0.1 % external cream Apply topically 2 times daily 80 g 1     VOLTAREN 1 % Gel [VOLTAREN 1 % GEL] Apply 2 g topically 2 (two) times a day as needed (to Feet). 150 g 2     XARELTO 20 mg tablet [XARELTO 20 MG TABLET] Take 1 tablet (20 mg total) by mouth daily. 90 tablet 2      Allergies   Allergen Reactions     Augmentin Unknown     itchy     Clindamycin Unknown     Fentanyl Unknown     itchy     Hydroxyzine      Penicillins Unknown     itchy     Septra [Sulfamethoxazole W/Trimethoprim] Unknown     itchy     Tramadol Unknown     itchy     Zyban [Bupropion] Unknown     Personality Change        Lab Results   Lab Results   Component Value Date     10/20/2021    CO2 23 10/20/2021    BUN 14 10/20/2021     Lab Results   Component Value Date    WBC 10.0 10/20/2021    HGB 15.2 10/20/2021    HCT 45.5 10/20/2021     10/20/2021     10/20/2021     Lab Results   Component Value Date    CHOL 240 10/20/2021    TRIG 179 10/20/2021    HDL 46 10/20/2021         Medical History  Surgical History   Past Medical History:   Diagnosis Date     Asthma      Depression       Environmental allergies      Esophageal stricture 09/15/2014    EGD done at [facility].     Gastric ulcer 09/15/2014    EGD done at [facility].     GERD (gastroesophageal reflux disease)      Left leg DVT (H)      Lupus (H)       Past Surgical History:   Procedure Laterality Date     ABDOMINOPLASTY       APPENDECTOMY        SECTION       ESOPHAGOSCOPY, GASTROSCOPY, DUODENOSCOPY (EGD), COMBINED  09/15/2014    Esophageal stricture and gastric ulceration.     EXCISION / CURETTAGE BONE CYST PHALANGES OF FOOT Left      HYSTERECTOMY TOTAL ABDOMINAL, BILATERAL SALPINGO-OOPHORECTOMY, COMBINED      for ovarian infection.     MANDIBLE SURGERY      lower jaw advancement. Left over numbness of tongue and gums.Totally 5 surgeries.     OVARIAN CYST SURGERY Right      SINUS SURGERY Bilateral

## 2023-10-14 ENCOUNTER — HEALTH MAINTENANCE LETTER (OUTPATIENT)
Age: 63
End: 2023-10-14

## 2023-12-28 ENCOUNTER — MYC REFILL (OUTPATIENT)
Dept: FAMILY MEDICINE | Facility: CLINIC | Age: 63
End: 2023-12-28
Payer: MEDICARE

## 2023-12-28 DIAGNOSIS — D68.62 LUPUS ANTICOAGULANT DISORDER (H): ICD-10-CM

## 2023-12-28 DIAGNOSIS — F41.9 ANXIETY: ICD-10-CM

## 2023-12-28 RX ORDER — RIVAROXABAN 20 MG/1
20 TABLET, FILM COATED ORAL DAILY
Qty: 30 TABLET | Refills: 6 | Status: SHIPPED | OUTPATIENT
Start: 2023-12-28 | End: 2024-07-19

## 2023-12-28 RX ORDER — DIAZEPAM 10 MG
10 TABLET ORAL EVERY 6 HOURS PRN
Qty: 15 TABLET | Refills: 0 | Status: SHIPPED | OUTPATIENT
Start: 2023-12-28 | End: 2024-06-12

## 2024-01-22 ENCOUNTER — TRANSFERRED RECORDS (OUTPATIENT)
Dept: HEALTH INFORMATION MANAGEMENT | Facility: CLINIC | Age: 64
End: 2024-01-22
Payer: MEDICARE

## 2024-01-30 ENCOUNTER — HOSPITAL ENCOUNTER (EMERGENCY)
Facility: CLINIC | Age: 64
Discharge: HOME | End: 2024-01-30
Attending: EMERGENCY MEDICINE | Admitting: EMERGENCY MEDICINE
Payer: MEDICARE

## 2024-01-30 ENCOUNTER — APPOINTMENT (OUTPATIENT)
Dept: CT IMAGING | Facility: CLINIC | Age: 64
End: 2024-01-30
Attending: EMERGENCY MEDICINE
Payer: MEDICARE

## 2024-01-30 VITALS
BODY MASS INDEX: 25.86 KG/M2 | DIASTOLIC BLOOD PRESSURE: 74 MMHG | WEIGHT: 146 LBS | HEART RATE: 96 BPM | RESPIRATION RATE: 20 BRPM | OXYGEN SATURATION: 96 % | TEMPERATURE: 97.5 F | SYSTOLIC BLOOD PRESSURE: 131 MMHG

## 2024-01-30 DIAGNOSIS — Z87.11 HISTORY OF GASTRIC ULCER: ICD-10-CM

## 2024-01-30 DIAGNOSIS — R10.13 EPIGASTRIC PAIN: ICD-10-CM

## 2024-01-30 LAB
ALBUMIN SERPL BCG-MCNC: 4.2 G/DL (ref 3.5–5.2)
ALBUMIN UR-MCNC: NEGATIVE MG/DL
ALP SERPL-CCNC: 153 U/L (ref 40–150)
ALT SERPL W P-5'-P-CCNC: 32 U/L (ref 0–50)
ANION GAP SERPL CALCULATED.3IONS-SCNC: 5 MMOL/L (ref 7–15)
APPEARANCE UR: CLEAR
AST SERPL W P-5'-P-CCNC: 29 U/L (ref 0–45)
ATRIAL RATE - MUSE: 88 BPM
BASOPHILS # BLD AUTO: 0.1 10E3/UL (ref 0–0.2)
BASOPHILS NFR BLD AUTO: 1 %
BILIRUB DIRECT SERPL-MCNC: <0.2 MG/DL (ref 0–0.3)
BILIRUB SERPL-MCNC: 0.3 MG/DL
BILIRUB UR QL STRIP: NEGATIVE
BUN SERPL-MCNC: 15.6 MG/DL (ref 8–23)
CALCIUM SERPL-MCNC: 9.2 MG/DL (ref 8.8–10.2)
CHLORIDE SERPL-SCNC: 106 MMOL/L (ref 98–107)
COLOR UR AUTO: ABNORMAL
CREAT SERPL-MCNC: 0.86 MG/DL (ref 0.51–0.95)
DEPRECATED HCO3 PLAS-SCNC: 30 MMOL/L (ref 22–29)
DIASTOLIC BLOOD PRESSURE - MUSE: NORMAL MMHG
EGFRCR SERPLBLD CKD-EPI 2021: 75 ML/MIN/1.73M2
EOSINOPHIL # BLD AUTO: 0.4 10E3/UL (ref 0–0.7)
EOSINOPHIL NFR BLD AUTO: 3 %
ERYTHROCYTE [DISTWIDTH] IN BLOOD BY AUTOMATED COUNT: 13.5 % (ref 10–15)
GLUCOSE SERPL-MCNC: 107 MG/DL (ref 70–99)
GLUCOSE UR STRIP-MCNC: NEGATIVE MG/DL
HCT VFR BLD AUTO: 44.8 % (ref 35–47)
HGB BLD-MCNC: 14.9 G/DL (ref 11.7–15.7)
HGB UR QL STRIP: NEGATIVE
IMM GRANULOCYTES # BLD: 0 10E3/UL
IMM GRANULOCYTES NFR BLD: 0 %
INTERPRETATION ECG - MUSE: NORMAL
KETONES UR STRIP-MCNC: NEGATIVE MG/DL
LEUKOCYTE ESTERASE UR QL STRIP: NEGATIVE
LIPASE SERPL-CCNC: 28 U/L (ref 13–60)
LYMPHOCYTES # BLD AUTO: 1.8 10E3/UL (ref 0.8–5.3)
LYMPHOCYTES NFR BLD AUTO: 17 %
MCH RBC QN AUTO: 33.3 PG (ref 26.5–33)
MCHC RBC AUTO-ENTMCNC: 33.3 G/DL (ref 31.5–36.5)
MCV RBC AUTO: 100 FL (ref 78–100)
MONOCYTES # BLD AUTO: 0.9 10E3/UL (ref 0–1.3)
MONOCYTES NFR BLD AUTO: 8 %
MUCOUS THREADS #/AREA URNS LPF: PRESENT /LPF
NEUTROPHILS # BLD AUTO: 7.4 10E3/UL (ref 1.6–8.3)
NEUTROPHILS NFR BLD AUTO: 71 %
NITRATE UR QL: NEGATIVE
NRBC # BLD AUTO: 0 10E3/UL
NRBC BLD AUTO-RTO: 0 /100
P AXIS - MUSE: 60 DEGREES
PH UR STRIP: 6.5 [PH] (ref 5–7)
PLATELET # BLD AUTO: 252 10E3/UL (ref 150–450)
POTASSIUM SERPL-SCNC: 4.3 MMOL/L (ref 3.4–5.3)
PR INTERVAL - MUSE: 146 MS
PROT SERPL-MCNC: 6.8 G/DL (ref 6.4–8.3)
QRS DURATION - MUSE: 70 MS
QT - MUSE: 364 MS
QTC - MUSE: 440 MS
R AXIS - MUSE: 83 DEGREES
RBC # BLD AUTO: 4.48 10E6/UL (ref 3.8–5.2)
RBC URINE: 0 /HPF
SODIUM SERPL-SCNC: 141 MMOL/L (ref 135–145)
SP GR UR STRIP: 1.02 (ref 1–1.03)
SQUAMOUS EPITHELIAL: <1 /HPF
SYSTOLIC BLOOD PRESSURE - MUSE: NORMAL MMHG
T AXIS - MUSE: 46 DEGREES
TROPONIN T SERPL HS-MCNC: 9 NG/L
UROBILINOGEN UR STRIP-MCNC: <2 MG/DL
VENTRICULAR RATE- MUSE: 88 BPM
WBC # BLD AUTO: 10.6 10E3/UL (ref 4–11)
WBC URINE: <1 /HPF

## 2024-01-30 PROCEDURE — 82248 BILIRUBIN DIRECT: CPT | Performed by: EMERGENCY MEDICINE

## 2024-01-30 PROCEDURE — 85025 COMPLETE CBC W/AUTO DIFF WBC: CPT | Performed by: EMERGENCY MEDICINE

## 2024-01-30 PROCEDURE — 250N000011 HC RX IP 250 OP 636: Performed by: EMERGENCY MEDICINE

## 2024-01-30 PROCEDURE — 250N000013 HC RX MED GY IP 250 OP 250 PS 637

## 2024-01-30 PROCEDURE — 83690 ASSAY OF LIPASE: CPT | Performed by: EMERGENCY MEDICINE

## 2024-01-30 PROCEDURE — 99285 EMERGENCY DEPT VISIT HI MDM: CPT | Mod: 25

## 2024-01-30 PROCEDURE — 71275 CT ANGIOGRAPHY CHEST: CPT | Mod: MG

## 2024-01-30 PROCEDURE — 96374 THER/PROPH/DIAG INJ IV PUSH: CPT | Mod: 59

## 2024-01-30 PROCEDURE — 93005 ELECTROCARDIOGRAM TRACING: CPT | Performed by: EMERGENCY MEDICINE

## 2024-01-30 PROCEDURE — 36415 COLL VENOUS BLD VENIPUNCTURE: CPT | Performed by: EMERGENCY MEDICINE

## 2024-01-30 PROCEDURE — 84450 TRANSFERASE (AST) (SGOT): CPT | Performed by: EMERGENCY MEDICINE

## 2024-01-30 PROCEDURE — 81001 URINALYSIS AUTO W/SCOPE: CPT | Performed by: EMERGENCY MEDICINE

## 2024-01-30 PROCEDURE — 84484 ASSAY OF TROPONIN QUANT: CPT | Performed by: EMERGENCY MEDICINE

## 2024-01-30 RX ORDER — IOPAMIDOL 755 MG/ML
100 INJECTION, SOLUTION INTRAVASCULAR ONCE
Status: COMPLETED | OUTPATIENT
Start: 2024-01-30 | End: 2024-01-30

## 2024-01-30 RX ORDER — MAGNESIUM HYDROXIDE/ALUMINUM HYDROXICE/SIMETHICONE 120; 1200; 1200 MG/30ML; MG/30ML; MG/30ML
15 SUSPENSION ORAL ONCE
Status: COMPLETED | OUTPATIENT
Start: 2024-01-30 | End: 2024-01-30

## 2024-01-30 RX ORDER — FAMOTIDINE 20 MG/1
20 TABLET, FILM COATED ORAL 2 TIMES DAILY
Qty: 28 TABLET | Refills: 0 | Status: SHIPPED | OUTPATIENT
Start: 2024-01-30 | End: 2024-06-21

## 2024-01-30 RX ORDER — SUCRALFATE ORAL 1 G/10ML
1 SUSPENSION ORAL 4 TIMES DAILY
Qty: 414 ML | Refills: 0 | Status: SHIPPED | OUTPATIENT
Start: 2024-01-30 | End: 2024-07-19

## 2024-01-30 RX ADMIN — IOPAMIDOL 100 ML: 755 INJECTION, SOLUTION INTRAVENOUS at 10:58

## 2024-01-30 RX ADMIN — ALUMINUM HYDROXIDE, MAGNESIUM HYDROXIDE, AND SIMETHICONE 15 ML: 200; 200; 20 SUSPENSION ORAL at 12:23

## 2024-01-30 RX ADMIN — FAMOTIDINE 20 MG: 10 INJECTION, SOLUTION INTRAVENOUS at 12:24

## 2024-01-30 NOTE — ED PROVIDER NOTES
EMERGENCY DEPARTMENT ENCOUNTER      NAME: Trenton Milian  AGE: 63 year old female  YOB: 1960  MRN: [id number]  EVALUATION DATE & TIME: No admission date for patient encounter.    PCP: Pancho Mckeon    ED PROVIDER: Jasmyne Carreon PA-C      Chief Complaint   Patient presents with    Back Pain    Chest Pain     FINAL IMPRESSION:  1. Epigastric pain    2. History of gastric ulcer        ED COURSE & MEDICAL DECISION MAKING:    Pertinent Labs & Imaging studies reviewed. (See chart for details)  63 year old female presents to the Emergency Department for evaluation of chest pain, back pain and abdominal pain.  For the past month patient has had mid back pain that radiates across her back as well as sharp chest pain that goes straight to her upper back.  Patient also complains of abdominal cramping.  Patient denies all other symptoms.  Patient is unaware of any provoking factors.  Vital signs reviewed and patient is hypertensive.  Afebrile.  On exam chest wall is nontender to palpation.  Cardiac and pulm exams unremarkable.  Abdomen is tender in all 4 quadrants.  Patient moves all 4 extremities without difficulty.  Pulses are 2+ bilaterally.  No overlying erythema, edema, ecchymosis.  No lacerations or abrasions.  No warmth.    Differential diagnosis includes ACS, pericarditis, myocarditis, pneumonia, pneumothorax, hemothorax, dissection.  Diverticulitis, colitis, GERD, gastritis, pancreatitis, cholecystitis was considered.  Low suspicion for PE as patient is not short of breath and is on blood thinners. PERC negative.  CBC shows no white blood cell elevation.  Hemoglobin is stable.  Alk phosphatase was 153 seems to be consistent with past.  Lipase was reassuring.  Bilirubin was reassuring.  Troponin was 9.  EKG showed no STEMI.  UA showed no nitrates or leukocytes.  CTA shows no acute pulmonary embolism or acute aortic syndrome.  Mild emphysema.  No superimposed acute lung or airway inflammatory  process.  No focal inflammatory process in the abdominal or pelvis.  Incidental 2 to 3 mm left lower lobe pulmonary nodule.  Patient was educated on her imaging and lab results.  Patient will be discharged with Carafate and Pepcid.  Patient will follow-up with her primary care doctor's history visit.  Patient return to the ED if new symptoms develop or symptoms worsen.  Patient agrees with plan.  All questions answered.  Patient was discharged in a stable condition.      ED COURSE:   10:13 AM  I saw the patient. Staffed with Dr. Huber.   12:28 PM Patient will be discharged home. All questions answered.        At the conclusion of the encounter I discussed the results of all of the tests and the disposition. The questions were answered. The patient or family acknowledged understanding and was agreeable with the care plan.     0 minutes of critical care time       Medical Decision Making  Obtained supplemental history:Supplemental history obtained?: No  Reviewed external records: External records reviewed?: No  Care impacted by chronic illness:N/A  Care significantly affected by social determinants of health:N/A  Did you consider but not order tests?: Work up considered but not performed and documented in chart, if applicable  Did you interpret images independently?: Independent interpretation of ECG and images noted in documentation, when applicable.  Consultation discussion with other provider:Did you involve another provider (consultant, MH, pharmacy, etc.)?: I discussed the care with another health care provider, see documentation for details.  Discharge. I prescribed additional prescription strength medication(s) as charted. See documentation for any additional details.      MEDICATIONS GIVEN IN THE EMERGENCY:  Medications   alum & mag hydroxide-simethicone (MAALOX) suspension 15 mL (15 mLs Oral $Given 1/30/24 3752)   famotidine (PEPCID) injection 20 mg (20 mg Intravenous $Given 1/30/24 6538)   iopamidol  (ISOVUE-370) solution 100 mL (100 mLs Intravenous $Given 24 1055)       NEW PRESCRIPTIONS STARTED AT TODAY'S ER VISIT  New Prescriptions    FAMOTIDINE (PEPCID) 20 MG TABLET    Take 1 tablet (20 mg) by mouth 2 times daily    SUCRALFATE (CARAFATE) 1 GM/10ML SUSPENSION    Take 10 mLs (1 g) by mouth 4 times daily          =================================================================    HPI    Patient information was obtained from: patient    Use of : N/A      Trenton Milian is a 63 year old female with a pertinent history of DVT, lupus anticoagulation disorder, esophageal stricture, anxiety, asthma, who presents to this ED for evaluation of chest pain.    A month ago patient started to develop mid back pain that radiates across to her upper back.  Patient reports that she experiences sharp chest pain that goes straight through her left nipple all the way to her back as well as abdominal pain.  Patient reports that her pain does come and go but lasts for several hours at a time.  She has not noticed any provoking factors.  Yesterday at 11 PM patient's pain was more severe than it has ever been before which prompted her to come to the emergency room this morning.    She denies fevers, chills, cough, congestion, dizziness, lightheadedness, nausea, vomiting, diarrhea, urinary symptoms.      REVIEW OF SYSTEMS   As per HPI    PAST MEDICAL HISTORY:  Past Medical History:   Diagnosis Date    Arthralgia of shoulder 10/7/2022    Asthma     Cardiac conduction disorder 10/7/2022    Depression     Environmental allergies     Esophageal stricture 09/15/2014    EGD done at [facility].    Gastric ulcer 09/15/2014    EGD done at [facility].    Generalized edema 2022    GERD (gastroesophageal reflux disease)     Left leg DVT (H)     Lupus (H)        PAST SURGICAL HISTORY:  Past Surgical History:   Procedure Laterality Date    ABDOMINOPLASTY      APPENDECTOMY       SECTION   1985    ESOPHAGOSCOPY, GASTROSCOPY, DUODENOSCOPY (EGD), COMBINED  09/15/2014    Esophageal stricture and gastric ulceration.    EXCISION / CURETTAGE BONE CYST PHALANGES OF FOOT Left 2008    HYSTERECTOMY TOTAL ABDOMINAL, BILATERAL SALPINGO-OOPHORECTOMY, COMBINED  1987    for ovarian infection.    MANDIBLE SURGERY  2007    lower jaw advancement. Left over numbness of tongue and gums.Totally 5 surgeries.    OVARIAN CYST SURGERY Right 1977    SINUS SURGERY Bilateral 1996           CURRENT MEDICATIONS:    famotidine (PEPCID) 20 MG tablet  sucralfate (CARAFATE) 1 GM/10ML suspension  CERAVE Crea cream  cetirizine (ZYRTEC) 10 MG tablet  diazepam (VALIUM) 10 MG tablet  gabapentin (NEURONTIN) 300 MG capsule  omeprazole (PRILOSEC) 20 MG DR capsule  PROAIR  (90 Base) MCG/ACT inhaler  sertraline (ZOLOFT) 25 MG tablet  triamcinolone (KENALOG) 0.1 % external cream  XARELTO ANTICOAGULANT 20 MG TABS tablet        ALLERGIES:  Allergies   Allergen Reactions    Adhesive Tape Itching    Amoxicillin-Pot Clavulanate Unknown     itchy    Clarithromycin     Clavulanic Acid     Clindamycin Unknown    Fentanyl Unknown     itchy    Hydroxyzine     Levofloxacin     Moviprep [Peg-Kcl-Nacl-Nasulf-Na Asc-C]     No Clinical Screening - See Comments     Oxycodone     Penicillins Unknown     itchy    Septra [Sulfamethoxazole-Trimethoprim] Unknown     itchy    Tramadol Unknown     itchy    Trimethoprim     Zyban [Bupropion] Unknown     Personality Change    Altex Rash    Cephalosporins Rash    Oxycodone-Acetaminophen Rash       FAMILY HISTORY:  Family History   Problem Relation Age of Onset    Hypertension Mother     Thyroid Disease Mother     Arthritis Mother     Breast Cancer Mother 85        breast cancer    Hypertension Father     Heart Disease Father         Valve replacement ,bypass    Arthritis Father     Breast Cancer Maternal Grandmother         70s    Other - See Comments Maternal Grandmother         some sort blood clot.    Anxiety  Disorder Brother     Obesity Brother     Rectal Cancer Brother     Depression Brother     Gout Son     Gout Son     Breast Cancer Paternal Aunt         40-50       SOCIAL HISTORY:   Social History     Socioeconomic History    Marital status:      Spouse name: Daniel    Number of children: 2    Years of education: 13    Highest education level: Some college, no degree   Occupational History    Occupation: Retired   Tobacco Use    Smoking status: Every Day     Packs/day: 0.50     Years: 40.00     Additional pack years: 0.00     Total pack years: 20.00     Types: Cigarettes    Smokeless tobacco: Never   Vaping Use    Vaping Use: Never used   Substance and Sexual Activity    Alcohol use: Yes     Comment: Occasional    Drug use: Never    Sexual activity: Yes     Partners: Male     Birth control/protection: Female Surgical       VITALS:  /74   Pulse 96   Temp 97.5  F (36.4  C)   Resp 20   Wt 66.2 kg (146 lb)   LMP  (LMP Unknown)   SpO2 96%   BMI 25.86 kg/m      PHYSICAL EXAM    Physical Exam  Vitals and nursing note reviewed.   Constitutional:       General: She is not in acute distress.     Appearance: Normal appearance. She is not ill-appearing, toxic-appearing or diaphoretic.   HENT:      Head: Atraumatic.      Right Ear: External ear normal.      Left Ear: External ear normal.      Nose: Nose normal.      Mouth/Throat:      Mouth: Mucous membranes are moist.   Eyes:      Conjunctiva/sclera: Conjunctivae normal.      Pupils: Pupils are equal, round, and reactive to light.   Cardiovascular:      Rate and Rhythm: Normal rate and regular rhythm.      Pulses: Normal pulses.      Heart sounds: Normal heart sounds. No murmur heard.     No friction rub. No gallop.   Pulmonary:      Effort: Pulmonary effort is normal.      Breath sounds: Normal breath sounds. No decreased breath sounds, wheezing, rhonchi or rales.   Chest:      Chest wall: No mass, deformity, tenderness, crepitus or edema. There is no  dullness to percussion.   Abdominal:      Tenderness: There is abdominal tenderness (generalized). There is no guarding or rebound.   Musculoskeletal:      Cervical back: Normal range of motion.      Comments: Cervical, thoracic, lumbar, sacral paraspinal muscles and spinous process are nontender to palpation.  Patient moves all 4 extremities without difficulty.  Normal range of motion, sensation and strength of all 4 extremities.  No tenderness to palpation of all 4 extremities.  Pulses are 2+ bilaterally.   Skin:     General: Skin is dry.   Neurological:      Mental Status: She is alert. Mental status is at baseline.   Psychiatric:         Mood and Affect: Mood normal.         Thought Content: Thought content normal.          LAB:  All pertinent labs reviewed and interpreted.  Labs Ordered and Resulted from Time of ED Arrival to Time of ED Departure   COMPREHENSIVE METABOLIC PANEL - Abnormal       Result Value    Sodium 141      Potassium 4.3      Carbon Dioxide (CO2) 30 (*)     Anion Gap 5 (*)     Urea Nitrogen 15.6      Creatinine 0.86      GFR Estimate 75      Calcium 9.2      Chloride 106      Glucose 107 (*)     Alkaline Phosphatase 153 (*)     AST 29      ALT 32      Protein Total 6.8      Albumin 4.2      Bilirubin Total 0.3     ROUTINE UA WITH MICROSCOPIC REFLEX TO CULTURE - Abnormal    Color Urine Light Yellow      Appearance Urine Clear      Glucose Urine Negative      Bilirubin Urine Negative      Ketones Urine Negative      Specific Gravity Urine 1.020      Blood Urine Negative      pH Urine 6.5      Protein Albumin Urine Negative      Urobilinogen Urine <2.0      Nitrite Urine Negative      Leukocyte Esterase Urine Negative      Mucus Urine Present (*)     RBC Urine 0      WBC Urine <1      Squamous Epithelials Urine <1     CBC WITH PLATELETS AND DIFFERENTIAL - Abnormal    WBC Count 10.6      RBC Count 4.48      Hemoglobin 14.9      Hematocrit 44.8            MCH 33.3 (*)     MCHC 33.3      RDW  13.5      Platelet Count 252      % Neutrophils 71      % Lymphocytes 17      % Monocytes 8      % Eosinophils 3      % Basophils 1      % Immature Granulocytes 0      NRBCs per 100 WBC 0      Absolute Neutrophils 7.4      Absolute Lymphocytes 1.8      Absolute Monocytes 0.9      Absolute Eosinophils 0.4      Absolute Basophils 0.1      Absolute Immature Granulocytes 0.0      Absolute NRBCs 0.0     LIPASE - Normal    Lipase 28     BILIRUBIN DIRECT - Normal    Bilirubin Direct <0.20     TROPONIN T, HIGH SENSITIVITY - Normal    Troponin T, High Sensitivity 9          RADIOLOGY:  Reviewed all pertinent imaging. Please see official radiology report.  CTA Chest Abdomen Pelvis w Contrast   Final Result   IMPRESSION:      1.  No acute pulmonary embolism or acute aortic syndrome.   2.  Mild emphysema. No superimposed acute lung or airway inflammatory process.   3.  No focal inflammatory process in the abdomen or pelvis.   4.  Incidental 2 - 3 mm left lower lobe pulmonary nodule does not require specific imaging workup or follow-up per 2017 Fleischner Society guidelines.             EKG:    Performed at: 30-JAN-2024, 09:30     Impression: Sinus rhythm. Normal ECG.     Rate: 88 BPM  Rhythm: Sinus rhythm.  Axis: 83  CA Interval: 146 ms  QRS Interval: 70 ms  QTc Interval: 440 ms  Comparison: When compared with ECG of 27-MAR-2023, 08:17, No significant change was found.    Dr. Huber and I have independently reviewed and interpreted the EKG(s) documented above.      Jasmyne Carreon PA-C  [facility] EMERGENCY ROOM  [address] [address] [phone]     Jasmyne Carreon PA-C  01/30/24 6199

## 2024-01-30 NOTE — ED PROVIDER NOTES
Emergency Department Midlevel Supervisory Note     I had a face to face encounter with this patient seen by the Advanced Practice Provider (JEFFY). I personally made/approved the management plan and take responsibility for the patient management. I personally saw patient and performed a substantive portion of the visit including all aspects of the medical decision making.     ED Course:  9:51 AM Jasmyne Carreon PA-C staffed patient with me. I agree with their assessment and plan of management, and I will see the patient.  12:01 PM I met with the patient to introduce myself, gather additional history, perform my initial exam, and discuss the plan. Updated patient on findings. I discussed plans for discharge with the patient, which they were agreeable to. We discussed supportive cares at home and reasons for return to the ER including new or worsening symptoms. All questions and concerns were addressed. Patient to be discharged by RN.     Brief HPI:     Trenton Milian is a 63 year old female with a pertinent history of DVT, lupus anticoagulation disorder, esophageal stricture, anxiety, asthma, who presents to this ED for evaluation of left sided chest pain, epigastric pain, and pain into the back.     A month ago patient started to develop mid back pain that radiates across to her upper back.  Patient reports that she experiences sharp chest pain that goes straight through her left nipple all the way to her back as well as epigastric abdominal pain.  Patient reports that her pain does come and go but lasts for several hours at a time.  She has not noticed any provoking factors.  Yesterday at 11 PM patient's pain was more severe than it has ever been before which prompted her to come to the emergency room this morning.     She denies fevers, chills, cough, congestion, dizziness, lightheadedness, nausea, vomiting, diarrhea, shortness of breath, urinary symptoms.    I, Jovanni Mosley, am serving as a scribe to  document services personally performed by Dee Dee Huber MD, based on my observations and the provider's statements to me.   I, Dee Dee Huber MD, attest that Jovanni Mosley was acting in a scribe capacity, has observed my performance of the services and has documented them in accordance with my direction.    Brief Physical Exam:   BP (!) 156/79   Pulse 96   Temp 97.5  F (36.4  C)   Resp 20   Wt 66.2 kg (146 lb)   LMP  (LMP Unknown)   SpO2 96%   BMI 25.86 kg/m    Constitutional:  Alert, in no acute distress  EYES: Conjunctivae clear  HENT:  Atraumatic  Respiratory:  Respirations even, unlabored, in no acute respiratory distress  Cardiovascular:  Regular rate and rhythm, good peripheral perfusion  GI: Soft, non-distended, mild epigastric tenderness. No rigidity. No rebound or guarding.   Musculoskeletal:  Moves all 4 extremities equally, grossly symmetrical strength  Integument: Warm & dry. No appreciable rash, erythema.  Neurologic:  Alert & oriented, speech clear and fluent, no focal deficits noted  Psych: Normal mood and affect       MDM:  Patient was seen in conjunction with Jasmyne the JEFFY please see her separate note.  On my evaluation, patient is well-appearing and in no acute distress.  Per my record review she does have prior history of esophageal stricture and gastric ulcer for which she had an endoscopy looks like last time back in 2014.  She states that she was traveling as her  was in the  so that was not here in [place].  She does state however that she has been seen by [facility] in the past.  She takes omeprazole daily.  Vital signs here are within normal limits and she is afebrile.  Unfortunately due to severe capacity issues her care was being performed in the waiting room and thus her medication delivery was very much delayed.  We had ordered a GI cocktail and IV Pepcid 20 mg for her.  She does states she feels somewhat improved at this point but would like these  medications at this time.  With her overall presentation we considered aortic dissection with the fact that the chest back and abdomen was involved in the pain and thus we obtained a CTA of the chest abdomen and pelvis with IV contrast.  She is also on anticoagulation wanted to ensure no intrathoracic or intra-abdominal or retroperitoneal bleeding.  Also considered atypical acute coronary syndrome.  It sounds like this has been coming and going over the past month and is not specifically associated with exertion does not sound anginal.  Do have high suspicion for GERD, ulcer, or esophageal spasm as well.  EKG was obtained which does not reveal any evidence of acute ischemia.  Troponin is within normal limits here and again the pain has been off-and-on for a month and had been occurring since 11 PM last night and thus does not require repeat at this point as this is within normal limits.  Overall again felt that acute coronary syndrome was unlikely.  Lipase here within normal limits making pancreatitis less likely.  Urinalysis unremarkable.  White blood cell count normal at 10.6.  Hemoglobin 14.9 and no signs or symptoms of GI bleeding.  CMP largely unremarkable.  Alk phos very slightly elevated at 153 however just above our upper limit of normal at 150.  AST ALT and bilirubin are within normal limits.    CTA of the chest and pelvis does not reveal any evidence of PE or acute aortic syndrome.  No sign of aortic dissection.  Mild emphysema.  No superimposed acute lung or airway inflammatory process.  No focal inflammatory process in the abdomen or pelvis.  Incidental left lower lobe pulmonary nodule does not require specific imaging workup or follow-up.  She has minimal degenerative osteophytes of the thoracic or lumbar spine.  She is a small diverticulum arising near the junction of the second and third portions of the duodenum.  Hepatobiliary is normal.  She has no coronary artery calcification.  No pericardial  effusion.  Has no right upper quadrant tenderness and thus felt that gallbladder pathology was less likely also reassuring no calcified gallstones on the CT.  Again at this point her pain has improved.  Discussed that this may potentially still be esophageal or GERD related and that I would like her to get into see GI soon as possible for evaluation.  We will add Pepcid as well as Carafate to her omeprazole regiment.  She was in agreement with this plan.  We discussed she can call [facility] and she was also given a referral here.  She was also advised to follow-up with her primary care doctor which she already has an appointment with.  We discussed strict return precautions.  She voiced understanding was comfortable with this plan and discharge.  She was discharged home in stable condition.         1. Epigastric pain    2. History of gastric ulcer            Labs and Imaging:  Results for orders placed or performed during the hospital encounter of 01/30/24   CTA Chest Abdomen Pelvis w Contrast    Impression    IMPRESSION:    1.  No acute pulmonary embolism or acute aortic syndrome.  2.  Mild emphysema. No superimposed acute lung or airway inflammatory process.  3.  No focal inflammatory process in the abdomen or pelvis.  4.  Incidental 2 - 3 mm left lower lobe pulmonary nodule does not require specific imaging workup or follow-up per 2017 Fleischner Society guidelines.     Comprehensive metabolic panel   Result Value Ref Range    Sodium 141 135 - 145 mmol/L    Potassium 4.3 3.4 - 5.3 mmol/L    Carbon Dioxide (CO2) 30 (H) 22 - 29 mmol/L    Anion Gap 5 (L) 7 - 15 mmol/L    Urea Nitrogen 15.6 8.0 - 23.0 mg/dL    Creatinine 0.86 0.51 - 0.95 mg/dL    GFR Estimate 75 >60 mL/min/1.73m2    Calcium 9.2 8.8 - 10.2 mg/dL    Chloride 106 98 - 107 mmol/L    Glucose 107 (H) 70 - 99 mg/dL    Alkaline Phosphatase 153 (H) 40 - 150 U/L    AST 29 0 - 45 U/L    ALT 32 0 - 50 U/L    Protein Total 6.8 6.4 - 8.3 g/dL    Albumin 4.2 3.5  - 5.2 g/dL    Bilirubin Total 0.3 <=1.2 mg/dL   Result Value Ref Range    Lipase 28 13 - 60 U/L   UA with Microscopic reflex to Culture    Specimen: Urine, Clean Catch   Result Value Ref Range    Color Urine Light Yellow Colorless, Straw, Light Yellow, Yellow    Appearance Urine Clear Clear    Glucose Urine Negative Negative mg/dL    Bilirubin Urine Negative Negative    Ketones Urine Negative Negative mg/dL    Specific Gravity Urine 1.020 1.001 - 1.030    Blood Urine Negative Negative    pH Urine 6.5 5.0 - 7.0    Protein Albumin Urine Negative Negative mg/dL    Urobilinogen Urine <2.0 <2.0 mg/dL    Nitrite Urine Negative Negative    Leukocyte Esterase Urine Negative Negative    Mucus Urine Present (A) None Seen /LPF    RBC Urine 0 <=2 /HPF    WBC Urine <1 <=5 /HPF    Squamous Epithelials Urine <1 <=1 /HPF   Result Value Ref Range    Bilirubin Direct <0.20 0.00 - 0.30 mg/dL   CBC with platelets and differential   Result Value Ref Range    WBC Count 10.6 4.0 - 11.0 10e3/uL    RBC Count 4.48 3.80 - 5.20 10e6/uL    Hemoglobin 14.9 11.7 - 15.7 g/dL    Hematocrit 44.8 35.0 - 47.0 %     78 - 100 fL    MCH 33.3 (H) 26.5 - 33.0 pg    MCHC 33.3 31.5 - 36.5 g/dL    RDW 13.5 10.0 - 15.0 %    Platelet Count 252 150 - 450 10e3/uL    % Neutrophils 71 %    % Lymphocytes 17 %    % Monocytes 8 %    % Eosinophils 3 %    % Basophils 1 %    % Immature Granulocytes 0 %    NRBCs per 100 WBC 0 <1 /100    Absolute Neutrophils 7.4 1.6 - 8.3 10e3/uL    Absolute Lymphocytes 1.8 0.8 - 5.3 10e3/uL    Absolute Monocytes 0.9 0.0 - 1.3 10e3/uL    Absolute Eosinophils 0.4 0.0 - 0.7 10e3/uL    Absolute Basophils 0.1 0.0 - 0.2 10e3/uL    Absolute Immature Granulocytes 0.0 <=0.4 10e3/uL    Absolute NRBCs 0.0 10e3/uL   Troponin T, High Sensitivity (now)   Result Value Ref Range    Troponin T, High Sensitivity 9 <=14 ng/L       I have reviewed the relevant laboratory studies above.        EKG: I reviewed and independently interpreted the patient's  EKG, with comments made as listed below. Please see scanned EKG for full report.   Performed at: 30-JAN-2024, 09:30     Impression: Sinus rhythm. No evidence of acute ischemia.      Rate: 88 BPM  Rhythm: Sinus rhythm.  Axis: 83  OH Interval: 146 ms  QRS Interval: 70 ms  QTc Interval: 440 ms  Comparison: When compared with ECG of 27-MAR-2023, 08:17, No significant change was found.    I have independently reviewed and interpreted the EKG(s) documented above.      Procedures:  I was present for the key portions of procedures documented in JEFFY/midlevel note, see midlevel note for further details.    Dee Dee Huber MD   [facility] EMERGENCY ROOM  [facility] [facility] [address] [phone]       Dee Dee Huber MD  01/31/24 0896

## 2024-01-30 NOTE — DISCHARGE INSTRUCTIONS
Continue taking your omeprazole as prescribed.  Can now add in addition the Pepcid twice a day and the Carafate as prescribed.  Avoid NSAIDs, spicy food, alcohol as these can exacerbate your symptoms.  Follow-up with the GI doctor as soon as possible.  Also follow-up with your primary care doctor as you planned to.    Return to the ER if you develop worsening or severe pain, severe chest pain, shortness of breath, fever, blood in the stool or vomit, or any new or worsening concerns.

## 2024-01-30 NOTE — ED TRIAGE NOTES
Pt arrives with epigastric chest pain since last night. Also reports middle back pain for the past month.      Triage Assessment (Adult)       Row Name 01/30/24 0924          Triage Assessment    Airway WDL WDL        Respiratory WDL    Respiratory WDL WDL        Skin Circulation/Temperature WDL    Skin Circulation/Temperature WDL WDL        Cardiac WDL    Cardiac WDL chest pain        Chest Pain Assessment    Chest Pain Location epigastric     Chest Pain Radiation back        Peripheral/Neurovascular WDL    Peripheral Neurovascular WDL WDL        Cognitive/Neuro/Behavioral WDL    Cognitive/Neuro/Behavioral WDL WDL

## 2024-02-13 ENCOUNTER — TRANSFERRED RECORDS (OUTPATIENT)
Dept: HEALTH INFORMATION MANAGEMENT | Facility: CLINIC | Age: 64
End: 2024-02-13
Payer: MEDICARE

## 2024-02-20 PROBLEM — N95.1 MENOPAUSAL SYMPTOM: Status: RESOLVED | Noted: 2022-10-07 | Resolved: 2024-02-20

## 2024-02-20 PROBLEM — R73.09 ABNORMAL GLUCOSE LEVEL: Status: ACTIVE | Noted: 2024-02-20

## 2024-02-20 RX ORDER — CYANOCOBALAMIN 1000 UG/ML
1 INJECTION, SOLUTION INTRAMUSCULAR; SUBCUTANEOUS
COMMUNITY
Start: 2023-11-25 | End: 2024-02-27

## 2024-02-20 RX ORDER — BETAMETHASONE DIPROPIONATE 0.5 MG/G
CREAM TOPICAL DAILY
COMMUNITY
Start: 2023-09-19

## 2024-02-27 ENCOUNTER — TRANSFERRED RECORDS (OUTPATIENT)
Dept: HEALTH INFORMATION MANAGEMENT | Facility: CLINIC | Age: 64
End: 2024-02-27

## 2024-02-27 ENCOUNTER — OFFICE VISIT (OUTPATIENT)
Dept: FAMILY MEDICINE | Facility: CLINIC | Age: 64
End: 2024-02-27
Payer: MEDICARE

## 2024-02-27 VITALS
OXYGEN SATURATION: 98 % | DIASTOLIC BLOOD PRESSURE: 64 MMHG | BODY MASS INDEX: 26.68 KG/M2 | HEART RATE: 89 BPM | RESPIRATION RATE: 18 BRPM | HEIGHT: 63 IN | WEIGHT: 150.6 LBS | SYSTOLIC BLOOD PRESSURE: 116 MMHG

## 2024-02-27 DIAGNOSIS — G25.81 RESTLESS LEGS SYNDROME (RLS): ICD-10-CM

## 2024-02-27 DIAGNOSIS — R53.82 CHRONIC FATIGUE: ICD-10-CM

## 2024-02-27 DIAGNOSIS — R10.84 ABDOMINAL PAIN, GENERALIZED: ICD-10-CM

## 2024-02-27 DIAGNOSIS — E55.9 VITAMIN D DEFICIENCY: ICD-10-CM

## 2024-02-27 DIAGNOSIS — E61.1 IRON DEFICIENCY: ICD-10-CM

## 2024-02-27 DIAGNOSIS — E53.8 VITAMIN B12 DEFICIENCY (NON ANEMIC): Primary | ICD-10-CM

## 2024-02-27 LAB
ALBUMIN SERPL BCG-MCNC: 4.2 G/DL (ref 3.5–5.2)
ALP SERPL-CCNC: 156 U/L (ref 40–150)
ALT SERPL W P-5'-P-CCNC: 36 U/L (ref 0–50)
ANION GAP SERPL CALCULATED.3IONS-SCNC: 13 MMOL/L (ref 7–15)
AST SERPL W P-5'-P-CCNC: 37 U/L (ref 0–45)
BASOPHILS # BLD AUTO: 0.1 10E3/UL (ref 0–0.2)
BASOPHILS NFR BLD AUTO: 1 %
BILIRUB SERPL-MCNC: 0.2 MG/DL
BUN SERPL-MCNC: 13.3 MG/DL (ref 8–23)
CALCIUM SERPL-MCNC: 9.4 MG/DL (ref 8.8–10.2)
CHLORIDE SERPL-SCNC: 106 MMOL/L (ref 98–107)
CREAT SERPL-MCNC: 0.74 MG/DL (ref 0.51–0.95)
CRP SERPL-MCNC: 4.83 MG/L
DEPRECATED HCO3 PLAS-SCNC: 23 MMOL/L (ref 22–29)
EGFRCR SERPLBLD CKD-EPI 2021: 89 ML/MIN/1.73M2
EOSINOPHIL # BLD AUTO: 0.4 10E3/UL (ref 0–0.7)
EOSINOPHIL NFR BLD AUTO: 4 %
ERYTHROCYTE [DISTWIDTH] IN BLOOD BY AUTOMATED COUNT: 13.2 % (ref 10–15)
FERRITIN SERPL-MCNC: 137 NG/ML (ref 11–328)
GLUCOSE SERPL-MCNC: 98 MG/DL (ref 70–99)
HCT VFR BLD AUTO: 44.6 % (ref 35–47)
HGB BLD-MCNC: 14.6 G/DL (ref 11.7–15.7)
IMM GRANULOCYTES # BLD: 0 10E3/UL
IMM GRANULOCYTES NFR BLD: 0 %
IRON BINDING CAPACITY (ROCHE): 296 UG/DL (ref 240–430)
IRON SATN MFR SERPL: 26 % (ref 15–46)
IRON SERPL-MCNC: 76 UG/DL (ref 37–145)
LYMPHOCYTES # BLD AUTO: 1.7 10E3/UL (ref 0.8–5.3)
LYMPHOCYTES NFR BLD AUTO: 16 %
MCH RBC QN AUTO: 33 PG (ref 26.5–33)
MCHC RBC AUTO-ENTMCNC: 32.7 G/DL (ref 31.5–36.5)
MCV RBC AUTO: 101 FL (ref 78–100)
MONOCYTES # BLD AUTO: 0.9 10E3/UL (ref 0–1.3)
MONOCYTES NFR BLD AUTO: 9 %
NEUTROPHILS # BLD AUTO: 7.4 10E3/UL (ref 1.6–8.3)
NEUTROPHILS NFR BLD AUTO: 71 %
PLATELET # BLD AUTO: 289 10E3/UL (ref 150–450)
POTASSIUM SERPL-SCNC: 4.6 MMOL/L (ref 3.4–5.3)
PROT SERPL-MCNC: 7 G/DL (ref 6.4–8.3)
RBC # BLD AUTO: 4.42 10E6/UL (ref 3.8–5.2)
SODIUM SERPL-SCNC: 142 MMOL/L (ref 135–145)
VIT B12 SERPL-MCNC: 593 PG/ML (ref 232–1245)
VIT D+METAB SERPL-MCNC: 25 NG/ML (ref 20–50)
WBC # BLD AUTO: 10.5 10E3/UL (ref 4–11)

## 2024-02-27 PROCEDURE — 83550 IRON BINDING TEST: CPT | Performed by: FAMILY MEDICINE

## 2024-02-27 PROCEDURE — 86231 EMA EACH IG CLASS: CPT | Mod: 90 | Performed by: FAMILY MEDICINE

## 2024-02-27 PROCEDURE — 99214 OFFICE O/P EST MOD 30 MIN: CPT | Performed by: FAMILY MEDICINE

## 2024-02-27 PROCEDURE — 85025 COMPLETE CBC W/AUTO DIFF WBC: CPT | Performed by: FAMILY MEDICINE

## 2024-02-27 PROCEDURE — 82784 ASSAY IGA/IGD/IGG/IGM EACH: CPT | Performed by: FAMILY MEDICINE

## 2024-02-27 PROCEDURE — 82728 ASSAY OF FERRITIN: CPT | Performed by: FAMILY MEDICINE

## 2024-02-27 PROCEDURE — 86258 DGP ANTIBODY EACH IG CLASS: CPT | Performed by: FAMILY MEDICINE

## 2024-02-27 PROCEDURE — 99000 SPECIMEN HANDLING OFFICE-LAB: CPT | Performed by: FAMILY MEDICINE

## 2024-02-27 PROCEDURE — 82306 VITAMIN D 25 HYDROXY: CPT | Performed by: FAMILY MEDICINE

## 2024-02-27 PROCEDURE — 36415 COLL VENOUS BLD VENIPUNCTURE: CPT | Performed by: FAMILY MEDICINE

## 2024-02-27 PROCEDURE — 80053 COMPREHEN METABOLIC PANEL: CPT | Performed by: FAMILY MEDICINE

## 2024-02-27 PROCEDURE — 82607 VITAMIN B-12: CPT | Performed by: FAMILY MEDICINE

## 2024-02-27 PROCEDURE — 86364 TISS TRNSGLTMNASE EA IG CLAS: CPT | Performed by: FAMILY MEDICINE

## 2024-02-27 PROCEDURE — 83540 ASSAY OF IRON: CPT | Performed by: FAMILY MEDICINE

## 2024-02-27 PROCEDURE — 86140 C-REACTIVE PROTEIN: CPT | Performed by: FAMILY MEDICINE

## 2024-02-27 RX ORDER — DICYCLOMINE HCL 20 MG
20 TABLET ORAL 4 TIMES DAILY PRN
COMMUNITY
Start: 2024-02-13

## 2024-02-27 RX ORDER — LATANOPROST 50 UG/ML
1 SOLUTION/ DROPS OPHTHALMIC DAILY
COMMUNITY
Start: 2024-02-01

## 2024-02-27 RX ORDER — CYANOCOBALAMIN 1000 UG/ML
1 INJECTION, SOLUTION INTRAMUSCULAR; SUBCUTANEOUS
Qty: 10 ML | Refills: 3 | Status: SHIPPED | OUTPATIENT
Start: 2024-02-27 | End: 2024-07-19

## 2024-02-27 ASSESSMENT — ASTHMA QUESTIONNAIRES
QUESTION_4 LAST FOUR WEEKS HOW OFTEN HAVE YOU USED YOUR RESCUE INHALER OR NEBULIZER MEDICATION (SUCH AS ALBUTEROL): ONCE A WEEK OR LESS
QUESTION_2 LAST FOUR WEEKS HOW OFTEN HAVE YOU HAD SHORTNESS OF BREATH: NOT AT ALL
ACT_TOTALSCORE: 24
QUESTION_3 LAST FOUR WEEKS HOW OFTEN DID YOUR ASTHMA SYMPTOMS (WHEEZING, COUGHING, SHORTNESS OF BREATH, CHEST TIGHTNESS OR PAIN) WAKE YOU UP AT NIGHT OR EARLIER THAN USUAL IN THE MORNING: NOT AT ALL
QUESTION_1 LAST FOUR WEEKS HOW MUCH OF THE TIME DID YOUR ASTHMA KEEP YOU FROM GETTING AS MUCH DONE AT WORK, SCHOOL OR AT HOME: NONE OF THE TIME
QUESTION_5 LAST FOUR WEEKS HOW WOULD YOU RATE YOUR ASTHMA CONTROL: COMPLETELY CONTROLLED
ACT_TOTALSCORE: 24

## 2024-02-27 ASSESSMENT — PATIENT HEALTH QUESTIONNAIRE - PHQ9
SUM OF ALL RESPONSES TO PHQ QUESTIONS 1-9: 8
10. IF YOU CHECKED OFF ANY PROBLEMS, HOW DIFFICULT HAVE THESE PROBLEMS MADE IT FOR YOU TO DO YOUR WORK, TAKE CARE OF THINGS AT HOME, OR GET ALONG WITH OTHER PEOPLE: NOT DIFFICULT AT ALL
SUM OF ALL RESPONSES TO PHQ QUESTIONS 1-9: 8

## 2024-02-27 ASSESSMENT — PAIN SCALES - GENERAL: PAINLEVEL: MODERATE PAIN (4)

## 2024-02-27 NOTE — PROGRESS NOTES
"Trenton Milian  /62   Pulse 89   Resp 18   Ht 1.6 m (5' 3\")   Wt 68.3 kg (150 lb 9.6 oz)   LMP  (LMP Unknown)   SpO2 98%   BMI 26.68 kg/m       Assessment/Plan:                Diagnoses and all orders for this visit:    Vitamin B12 deficiency (non anemic)  -     cyanocobalamin (CYANOCOBALAMIN) 1000 MCG/ML injection; Inject 1 mL (1,000 mcg) into the muscle every 30 days  -     CBC with Platelets & Differential; Future  -     Vitamin B12; Future  -     IgA [LAB73]; Future  -     Deamidated Giladin Peptide Kyleigh IgA IgG [[id number]]; Future  -     Tissue transglutaminase kyleigh IgA and IgG [[id number]]; Future  -     Endomysial Antibody IgA by IFA [[id number]]; Future  -     Adult GI  Referral - Consult Only; Future    Iron deficiency  -     CBC with Platelets & Differential; Future  -     IgA [LAB73]; Future  -     Deamidated Giladin Peptide Kyleigh IgA IgG [[id number]]; Future  -     Tissue transglutaminase kyleigh IgA and IgG [[id number]]; Future  -     Endomysial Antibody IgA by IFA [[id number]]; Future  -     Ferritin; Future  -     Iron and iron binding capacity; Future  -     Adult GI  Referral - Consult Only; Future    Abdominal pain, generalized  -     Comprehensive metabolic panel; Future  -     CRP, inflammation; Future  -     IgA [LAB73]; Future  -     Deamidated Giladin Peptide Kyleigh IgA IgG [[id number]]; Future  -     Tissue transglutaminase kyleigh IgA and IgG [[id number]]; Future  -     Endomysial Antibody IgA by IFA [[id number]]; Future  -     Adult GI  Referral - Consult Only; Future    Restless legs syndrome (RLS)  -     Adult Sleep Eval & Management  Referral; Future    Chronic fatigue  -     Adult Sleep Eval & Management  Referral; Future    Vitamin D deficiency  -     Vitamin D Deficiency; Future         DISCUSSION  Obtain labs as above make referrals as noted above and discussed in more detail below.  Subjective:     HPI:    Trenton Milian is a 63 year old female has medical history " includes lupus anticoagulant with a history of DVT on chronic anticoagulation, intermittent asthma, acid reflux, depression, anxiety and restless leg.  She also has a longstanding history of iron deficiency which is believed to be a contributing factor to her restless leg syndrome.  She also has B12 deficiency for which she takes injections.  Despite history of iron infusions and B12 supplementation with injection she continues to be often low in both iron and B12 without any definitive known etiology.  These concerns have typically been managed by her neurologist who is managing her restless leg syndrome.  She has not had a distinct workup to determine the cause of these deficiencies.  She is having more issues with restless leg.    She recently was seen in the emergency department for concerns of unusual abdominal pain.  She had a workup which is reviewed today which was largely unrevealing.  She was referred to gastroenterology who felt that her pain symptom was likely more musculoskeletal.  The recommended some symptom relieving medications and to follow-up with primary care provider.  Symptoms have resolved and not recurred.  Patient describes what seems to be more consistent with a neuropathic type pain emanating from the upper lumbar/lower thoracic spine region wrapping around both sides in a dermatomal like pattern.  This is been a symptom she has had in the past to varying degrees.  It is resolved at this time so elected not to take any more specific action at this time but will continue to keep in mind the possibility of pursuing further workup.  Based on the symptoms described in the largely unrevealing workup from a gastrointestinal/internal organ standpoint I would be more inclined to pursue evaluating her spine region to determine if there was any concern for potential neurologic impingement.    We did discuss restless legs and ongoing fatigue.  She is never seen a sleep specialist but had this concern  managed through neurology.  We discussed the potential benefits of having a more in-depth evaluation of her sleep overall in itself to help determine if there are other etiologies that may be a concern.  Will make a referral to the sleep specialist for this purpose.      We also discussed trying to make a more definitive determination as to the reason for her low iron and B12 especially given the difficulties with replacement with typical dosing of B12 and history of iron infusions.  Elected to proceed with help from gastroenterology for this purpose specifically as one of the concerns would be that potentially she has an absorption issue that we need to know about.    ROS:  Complete review of systems is obtained.  Other than the specific considerations noted above complete review of systems is negative.      Objective:   Medications:  Current Outpatient Medications   Medication    betamethasone dipropionate (DIPROSONE) 0.05 % external cream    CERAVE Crea cream    cetirizine (ZYRTEC) 10 MG tablet    cyanocobalamin (CYANOCOBALAMIN) 1000 MCG/ML injection    diazepam (VALIUM) 10 MG tablet    gabapentin (NEURONTIN) 300 MG capsule    latanoprost (XALATAN) 0.005 % ophthalmic solution    omeprazole (PRILOSEC) 20 MG DR capsule    PROAIR  (90 Base) MCG/ACT inhaler    sertraline (ZOLOFT) 25 MG tablet    XARELTO ANTICOAGULANT 20 MG TABS tablet    dicyclomine (BENTYL) 20 MG tablet    famotidine (PEPCID) 20 MG tablet    sucralfate (CARAFATE) 1 GM/10ML suspension    triamcinolone (KENALOG) 0.1 % external cream     No current facility-administered medications for this visit.        Allergies:     Allergies   Allergen Reactions    Adhesive Tape Itching    Amoxicillin-Pot Clavulanate Unknown     itchy    Clarithromycin     Clavulanic Acid     Clindamycin Unknown    Fentanyl Unknown     itchy    Hydroxyzine     Levofloxacin     Moviprep [Peg-Kcl-Nacl-Nasulf-Na Asc-C]     No Clinical Screening - See Comments     Oxycodone      Penicillins Unknown     itchy    Septra [Sulfamethoxazole-Trimethoprim] Unknown     itchy    Tramadol Unknown     itchy    Trimethoprim     Zyban [Bupropion] Unknown     Personality Change    Altex Rash    Cephalosporins Rash    Oxycodone-Acetaminophen Rash        Social History     Socioeconomic History    Marital status:      Spouse name: Daniel    Number of children: 2    Years of education: 13    Highest education level: Some college, no degree   Occupational History    Occupation: Retired   Tobacco Use    Smoking status: Every Day     Packs/day: 0.50     Years: 40.00     Additional pack years: 0.00     Total pack years: 20.00     Types: Cigarettes    Smokeless tobacco: Never   Vaping Use    Vaping Use: Never used   Substance and Sexual Activity    Alcohol use: Yes     Comment: Occasional    Drug use: Never    Sexual activity: Yes     Partners: Male     Birth control/protection: Female Surgical   Other Topics Concern    Not on file   Social History Narrative    Not on file     Social Determinants of Health     Financial Resource Strain: Not on file   Food Insecurity: Not on file   Transportation Needs: Not on file   Physical Activity: Not on file   Stress: Not on file   Social Connections: Not on file   Interpersonal Safety: Not on file   Housing Stability: Not on file       Family History   Problem Relation Age of Onset    Hypertension Mother     Thyroid Disease Mother     Arthritis Mother     Breast Cancer Mother 85        breast cancer    Hypertension Father     Heart Disease Father         Valve replacement ,bypass    Arthritis Father     Breast Cancer Maternal Grandmother         70s    Other - See Comments Maternal Grandmother         some sort blood clot.    Anxiety Disorder Brother     Obesity Brother     Rectal Cancer Brother     Depression Brother     Gout Son     Gout Son     Breast Cancer Paternal Aunt         40-50        Most Recent Immunizations   Administered Date(s) Administered     "COVID-19 MONOVALENT 12+ (Pfizer) 02/22/2021    COVID-19 Monovalent 12+ (Pfizer 2022) 05/10/2022    Influenza Vaccine 18-64 (Flublok) 11/20/2021    Influenza Vaccine >6 months,quad, PF 09/15/2020    Pneumococcal 23 valent 09/15/2020    TDAP (Adacel,Boostrix) 11/18/2019    Twinrix A/B 11/28/2018    Typhoid IM 06/23/2023    Zoster recombinant adjuvanted (SHINGRIX) 06/01/2021    Zoster vaccine, live 03/20/2012        Wt Readings from Last 3 Encounters:   02/27/24 68.3 kg (150 lb 9.6 oz)   01/30/24 66.2 kg (146 lb)   06/30/23 68 kg (150 lb)        BP Readings from Last 6 Encounters:   02/27/24 116/64   01/30/24 131/74   08/08/23 112/67   06/30/23 122/72   06/19/23 120/70   03/27/23 108/74        Hemoglobin A1C   Date Value Ref Range Status   10/07/2022 5.7 (H) 0.0 - 5.6 % Final     Comment:     Normal <5.7%   Prediabetes 5.7-6.4%    Diabetes 6.5% or higher     Note: Adopted from ADA consensus guidelines.              PHYSICAL EXAM:    /64   Pulse 89   Resp 18   Ht 1.6 m (5' 3\")   Wt 68.3 kg (150 lb 9.6 oz)   LMP  (LMP Unknown)   SpO2 98%   BMI 26.68 kg/m           General Appearance:    Alert, cooperative, no distress   Eyes:   No scleral icterus or conjunctival irritation       Lungs:     Clear to auscultation bilaterally, respirations unlabored, no wheezes or crackles   Heart:    Regular rate and rhythm,  No murmur   Abdomen:    Soft, no distension diffuse tenderness     Extremities:  No edema, no joint swelling or redness, no evidence of any injuries   Skin:  No concerning skin findings, no suspicious moles, no rashes   Neurologic:  On gross examination there is no motor or sensory deficit.  Patient walks with a normal gait                                     Answers submitted by the patient for this visit:  Patient Health Questionnaire (Submitted on 2/27/2024)  If you checked off any problems, how difficult have these problems made it for you to do your work, take care of things at home, or get along " with other people?: Not difficult at all  PHQ9 TOTAL SCORE: 8  General Questionnaire (Submitted on 2/27/2024)  Chief Complaint: Chronic problems general questions HPI Form  What is the reason for your visit today? : iron and B12 levels  How many servings of fruits and vegetables do you eat daily?: 2-3  On average, how many sweetened beverages do you drink each day (Examples: soda, juice, sweet tea, etc.  Do NOT count diet or artificially sweetened beverages)?: 0  How many minutes a day do you exercise enough to make your heart beat faster?: 30 to 60  How many days a week do you exercise enough to make your heart beat faster?: 7  How many days per week do you miss taking your medication?: 0

## 2024-02-28 LAB
GLIADIN IGA SER-ACNC: 4.2 U/ML
GLIADIN IGG SER-ACNC: <0.6 U/ML
IGA SERPL-MCNC: 257 MG/DL (ref 84–499)
TTG IGA SER-ACNC: 1 U/ML
TTG IGG SER-ACNC: 0.7 U/ML

## 2024-02-29 LAB — ENDOMYSIUM IGA TITR SER IF: NORMAL {TITER}

## 2024-03-26 ENCOUNTER — OFFICE VISIT (OUTPATIENT)
Dept: FAMILY MEDICINE | Facility: CLINIC | Age: 64
End: 2024-03-26
Payer: MEDICARE

## 2024-03-26 ENCOUNTER — TRANSFERRED RECORDS (OUTPATIENT)
Dept: HEALTH INFORMATION MANAGEMENT | Facility: CLINIC | Age: 64
End: 2024-03-26

## 2024-03-26 VITALS
WEIGHT: 143 LBS | RESPIRATION RATE: 18 BRPM | TEMPERATURE: 98.6 F | DIASTOLIC BLOOD PRESSURE: 79 MMHG | SYSTOLIC BLOOD PRESSURE: 131 MMHG | OXYGEN SATURATION: 98 % | HEART RATE: 116 BPM | BODY MASS INDEX: 25.33 KG/M2

## 2024-03-26 DIAGNOSIS — J01.00 ACUTE NON-RECURRENT MAXILLARY SINUSITIS: Primary | ICD-10-CM

## 2024-03-26 PROCEDURE — 99213 OFFICE O/P EST LOW 20 MIN: CPT | Performed by: PHYSICIAN ASSISTANT

## 2024-03-26 RX ORDER — AZITHROMYCIN 250 MG/1
TABLET, FILM COATED ORAL
Qty: 6 TABLET | Refills: 0 | Status: SHIPPED | OUTPATIENT
Start: 2024-03-26 | End: 2024-03-31

## 2024-03-26 NOTE — PROGRESS NOTES
Assessment & Plan:      Problem List Items Addressed This Visit    None  Visit Diagnoses       Acute non-recurrent maxillary sinusitis    -  Primary    Relevant Medications    azithromycin (ZITHROMAX) 250 MG tablet          Medical Decision Making  Patient presents with sinus congestion for 10 days.  Symptoms concerning for bacterial sinusitis.  Recommend oral antibiotics.  Patient has had good relief of sinusitis with azithromycin in the past.  She also has a number of allergies.  Recommend she continue with the warm compresses for the groin lesion.  She understands to follow-up for incision and drainage if symptoms worsen.  Discussed treatment and symptomatic care.  Allergies and medication interactions reviewed.  Discussed signs of worsening symptoms and when to follow-up with PCP if no symptom improvement.     Subjective:      Trenton Milian is a 63 year old female here for evaluation of sinus congestion.  Onset of symptoms was 10 days ago.  Patient initially started with sore throat, rhinorrhea, and cough.  She now has worsening nasal congestion and sinus pressure primarily over the right maxillary sinus.  Patient also notes a tender lump in the groin for 2 days.  She has been using warm compresses.  She does not feel the lump would require incision and drainage.     The following portions of the patient's history were reviewed and updated as appropriate: allergies, current medications, and problem list.     Review of Systems  Pertinent items are noted in HPI.    Allergies  Allergies   Allergen Reactions    Adhesive Tape Itching    Amoxicillin     Clarithromycin     Clavulanic Acid     Clindamycin Unknown    Fentanyl Unknown     itchy    Hydroxyzine     Levofloxacin     Moviprep [Peg-Kcl-Nacl-Nasulf-Na Asc-C]     No Clinical Screening - See Comments     Oxycodone     Penicillins Unknown     itchy    Septra [Sulfamethoxazole-Trimethoprim] Unknown     itchy    Sulfamethoxazole     Tramadol Unknown     itchy     Trimethoprim     Zyban [Bupropion] Unknown     Personality Change    Altex Rash    Cephalosporins Rash    Oxycodone-Acetaminophen Rash       Family History   Problem Relation Age of Onset    Hypertension Mother     Thyroid Disease Mother     Arthritis Mother     Breast Cancer Mother 85        breast cancer    Hypertension Father     Heart Disease Father         Valve replacement ,bypass    Arthritis Father     Breast Cancer Maternal Grandmother         70s    Other - See Comments Maternal Grandmother         some sort blood clot.    Anxiety Disorder Brother     Obesity Brother     Rectal Cancer Brother     Depression Brother     Gout Son     Gout Son     Breast Cancer Paternal Aunt         40-50       Social History     Tobacco Use    Smoking status: Every Day     Packs/day: 0.50     Years: 40.00     Additional pack years: 0.00     Total pack years: 20.00     Types: Cigarettes    Smokeless tobacco: Never   Substance Use Topics    Alcohol use: Yes     Comment: Occasional        Objective:      /79 (BP Location: Right arm, Patient Position: Sitting, Cuff Size: Adult Regular)   Pulse 116   Temp 98.6  F (37  C) (Oral)   Resp 18   Wt 64.9 kg (143 lb)   LMP  (LMP Unknown)   SpO2 98%   BMI 25.33 kg/m    General appearance - alert, well appearing, and in no distress and non-toxic  Ears - bilateral TM's and external ear canals normal  Nose - normal and patent, no erythema, discharge or polyps  Mouth - mucous membranes moist, pharynx normal without lesions  Neck - right-sided anterior cervical lymphadenopathy  Chest - clear to auscultation, no wheezes, rales or rhonchi, symmetric air entry  Heart - normal rate, regular rhythm, normal S1, S2, no murmurs, rubs, clicks or gallops    The use of Dragon/[employer] dictation services was used to construct the content of this note; any grammatical errors are non-intentional. Please contact the author directly if you are in need of any clarification.

## 2024-03-28 DIAGNOSIS — K29.50 CHRONIC GASTRITIS WITHOUT BLEEDING, UNSPECIFIED GASTRITIS TYPE: ICD-10-CM

## 2024-03-28 DIAGNOSIS — F32.1 CURRENT MODERATE EPISODE OF MAJOR DEPRESSIVE DISORDER WITHOUT PRIOR EPISODE (H): ICD-10-CM

## 2024-03-28 RX ORDER — SERTRALINE HYDROCHLORIDE 25 MG/1
25 TABLET, FILM COATED ORAL DAILY
Qty: 90 TABLET | Refills: 3 | OUTPATIENT
Start: 2024-03-28

## 2024-05-08 ENCOUNTER — TRANSFERRED RECORDS (OUTPATIENT)
Dept: HEALTH INFORMATION MANAGEMENT | Facility: CLINIC | Age: 64
End: 2024-05-08
Payer: MEDICARE

## 2024-05-24 ENCOUNTER — TRANSFERRED RECORDS (OUTPATIENT)
Dept: HEALTH INFORMATION MANAGEMENT | Facility: CLINIC | Age: 64
End: 2024-05-24
Payer: MEDICARE

## 2024-06-12 DIAGNOSIS — F41.9 ANXIETY: ICD-10-CM

## 2024-06-12 RX ORDER — DIAZEPAM 10 MG
TABLET ORAL
Qty: 15 TABLET | Refills: 0 | Status: SHIPPED | OUTPATIENT
Start: 2024-06-12 | End: 2024-07-19

## 2024-06-13 PROBLEM — E61.1 IRON DEFICIENCY: Status: ACTIVE | Noted: 2024-06-13

## 2024-06-13 PROBLEM — R14.2 BURPING: Status: ACTIVE | Noted: 2024-06-13

## 2024-06-13 PROBLEM — M54.9 BACKACHE: Status: ACTIVE | Noted: 2024-06-13

## 2024-06-21 ENCOUNTER — OFFICE VISIT (OUTPATIENT)
Dept: FAMILY MEDICINE | Facility: CLINIC | Age: 64
End: 2024-06-21
Payer: MEDICARE

## 2024-06-21 VITALS
BODY MASS INDEX: 25.92 KG/M2 | HEIGHT: 63 IN | WEIGHT: 146.3 LBS | OXYGEN SATURATION: 98 % | RESPIRATION RATE: 18 BRPM | TEMPERATURE: 98.4 F | SYSTOLIC BLOOD PRESSURE: 128 MMHG | HEART RATE: 81 BPM | DIASTOLIC BLOOD PRESSURE: 76 MMHG

## 2024-06-21 DIAGNOSIS — R73.03 PREDIABETES: ICD-10-CM

## 2024-06-21 DIAGNOSIS — G25.81 RESTLESS LEGS SYNDROME (RLS): ICD-10-CM

## 2024-06-21 DIAGNOSIS — Z00.00 MEDICARE ANNUAL WELLNESS VISIT, INITIAL: Primary | ICD-10-CM

## 2024-06-21 DIAGNOSIS — E78.5 DYSLIPIDEMIA: ICD-10-CM

## 2024-06-21 DIAGNOSIS — E61.1 IRON DEFICIENCY: ICD-10-CM

## 2024-06-21 DIAGNOSIS — R06.83 SNORING: ICD-10-CM

## 2024-06-21 DIAGNOSIS — D68.62 LUPUS ANTICOAGULANT DISORDER (H): ICD-10-CM

## 2024-06-21 DIAGNOSIS — R53.82 CHRONIC FATIGUE: ICD-10-CM

## 2024-06-21 DIAGNOSIS — H91.93 BILATERAL HEARING LOSS, UNSPECIFIED HEARING LOSS TYPE: ICD-10-CM

## 2024-06-21 DIAGNOSIS — E53.8 VITAMIN B12 DEFICIENCY (NON ANEMIC): ICD-10-CM

## 2024-06-21 DIAGNOSIS — K29.50 CHRONIC GASTRITIS WITHOUT BLEEDING, UNSPECIFIED GASTRITIS TYPE: ICD-10-CM

## 2024-06-21 DIAGNOSIS — Z91.09 ENVIRONMENTAL ALLERGIES: ICD-10-CM

## 2024-06-21 DIAGNOSIS — Z78.0 POST-MENOPAUSAL: ICD-10-CM

## 2024-06-21 LAB
ALBUMIN SERPL BCG-MCNC: 4.3 G/DL (ref 3.5–5.2)
ALP SERPL-CCNC: 141 U/L (ref 40–150)
ALT SERPL W P-5'-P-CCNC: 35 U/L (ref 0–50)
ANION GAP SERPL CALCULATED.3IONS-SCNC: 8 MMOL/L (ref 7–15)
AST SERPL W P-5'-P-CCNC: 37 U/L (ref 0–45)
BILIRUB SERPL-MCNC: 0.3 MG/DL
BUN SERPL-MCNC: 15 MG/DL (ref 8–23)
CALCIUM SERPL-MCNC: 9.5 MG/DL (ref 8.8–10.2)
CHLORIDE SERPL-SCNC: 106 MMOL/L (ref 98–107)
CHOLEST SERPL-MCNC: 243 MG/DL
CREAT SERPL-MCNC: 0.82 MG/DL (ref 0.51–0.95)
DEPRECATED HCO3 PLAS-SCNC: 27 MMOL/L (ref 22–29)
EGFRCR SERPLBLD CKD-EPI 2021: 79 ML/MIN/1.73M2
ERYTHROCYTE [DISTWIDTH] IN BLOOD BY AUTOMATED COUNT: 13.7 % (ref 10–15)
FASTING STATUS PATIENT QL REPORTED: NO
FASTING STATUS PATIENT QL REPORTED: NO
FERRITIN SERPL-MCNC: 76 NG/ML (ref 11–328)
GLUCOSE SERPL-MCNC: 95 MG/DL (ref 70–99)
HBA1C MFR BLD: 5.8 % (ref 0–5.6)
HCT VFR BLD AUTO: 46.1 % (ref 35–47)
HDLC SERPL-MCNC: 47 MG/DL
HGB BLD-MCNC: 15 G/DL (ref 11.7–15.7)
IRON BINDING CAPACITY (ROCHE): 305 UG/DL (ref 240–430)
IRON SATN MFR SERPL: 43 % (ref 15–46)
IRON SERPL-MCNC: 130 UG/DL (ref 37–145)
LDLC SERPL CALC-MCNC: 164 MG/DL
MCH RBC QN AUTO: 32.4 PG (ref 26.5–33)
MCHC RBC AUTO-ENTMCNC: 32.5 G/DL (ref 31.5–36.5)
MCV RBC AUTO: 100 FL (ref 78–100)
NONHDLC SERPL-MCNC: 196 MG/DL
PLATELET # BLD AUTO: 254 10E3/UL (ref 150–450)
POTASSIUM SERPL-SCNC: 5 MMOL/L (ref 3.4–5.3)
PROT SERPL-MCNC: 7.2 G/DL (ref 6.4–8.3)
RBC # BLD AUTO: 4.63 10E6/UL (ref 3.8–5.2)
SODIUM SERPL-SCNC: 141 MMOL/L (ref 135–145)
TRIGL SERPL-MCNC: 162 MG/DL
VIT B12 SERPL-MCNC: 1247 PG/ML (ref 232–1245)
WBC # BLD AUTO: 10.2 10E3/UL (ref 4–11)

## 2024-06-21 PROCEDURE — 85027 COMPLETE CBC AUTOMATED: CPT | Performed by: FAMILY MEDICINE

## 2024-06-21 PROCEDURE — 99207 PR INITIAL PREVENTIVE EXAM STAT: CPT | Performed by: FAMILY MEDICINE

## 2024-06-21 PROCEDURE — 82728 ASSAY OF FERRITIN: CPT | Performed by: FAMILY MEDICINE

## 2024-06-21 PROCEDURE — 83540 ASSAY OF IRON: CPT | Performed by: FAMILY MEDICINE

## 2024-06-21 PROCEDURE — 83036 HEMOGLOBIN GLYCOSYLATED A1C: CPT | Performed by: FAMILY MEDICINE

## 2024-06-21 PROCEDURE — 36415 COLL VENOUS BLD VENIPUNCTURE: CPT | Performed by: FAMILY MEDICINE

## 2024-06-21 PROCEDURE — 82607 VITAMIN B-12: CPT | Performed by: FAMILY MEDICINE

## 2024-06-21 PROCEDURE — 80061 LIPID PANEL: CPT | Performed by: FAMILY MEDICINE

## 2024-06-21 PROCEDURE — 90480 ADMN SARSCOV2 VAC 1/ONLY CMP: CPT | Performed by: FAMILY MEDICINE

## 2024-06-21 PROCEDURE — 83550 IRON BINDING TEST: CPT | Performed by: FAMILY MEDICINE

## 2024-06-21 PROCEDURE — 80053 COMPREHEN METABOLIC PANEL: CPT | Performed by: FAMILY MEDICINE

## 2024-06-21 PROCEDURE — 91320 SARSCV2 VAC 30MCG TRS-SUC IM: CPT | Performed by: FAMILY MEDICINE

## 2024-06-21 PROCEDURE — 99214 OFFICE O/P EST MOD 30 MIN: CPT | Mod: 25 | Performed by: FAMILY MEDICINE

## 2024-06-21 RX ORDER — FAMOTIDINE 20 MG/1
20 TABLET, FILM COATED ORAL 2 TIMES DAILY
Qty: 180 TABLET | Refills: 1 | Status: SHIPPED | OUTPATIENT
Start: 2024-06-21

## 2024-06-21 SDOH — HEALTH STABILITY: PHYSICAL HEALTH: ON AVERAGE, HOW MANY MINUTES DO YOU ENGAGE IN EXERCISE AT THIS LEVEL?: 40 MIN

## 2024-06-21 SDOH — HEALTH STABILITY: PHYSICAL HEALTH: ON AVERAGE, HOW MANY DAYS PER WEEK DO YOU ENGAGE IN MODERATE TO STRENUOUS EXERCISE (LIKE A BRISK WALK)?: 6 DAYS

## 2024-06-21 ASSESSMENT — ACTIVITIES OF DAILY LIVING (ADL)
HEARING_DIFFICULTY_OR_DEAF: NO
DOING_ERRANDS_INDEPENDENTLY_DIFFICULTY: NO
TOILETING_ISSUES: NO
CHANGE_IN_FUNCTIONAL_STATUS_SINCE_ONSET_OF_CURRENT_ILLNESS/INJURY: NO
DIFFICULTY_EATING/SWALLOWING: NO
WEAR_GLASSES_OR_BLIND: YES
FALL_HISTORY_WITHIN_LAST_SIX_MONTHS: NO
WALKING_OR_CLIMBING_STAIRS_DIFFICULTY: NO
CONCENTRATING,_REMEMBERING_OR_MAKING_DECISIONS_DIFFICULTY: NO
DRESSING/BATHING_DIFFICULTY: NO
DIFFICULTY_COMMUNICATING: NO

## 2024-06-21 ASSESSMENT — PAIN SCALES - GENERAL: PAINLEVEL: NO PAIN (0)

## 2024-06-21 ASSESSMENT — PATIENT HEALTH QUESTIONNAIRE - PHQ9
SUM OF ALL RESPONSES TO PHQ QUESTIONS 1-9: 6
SUM OF ALL RESPONSES TO PHQ QUESTIONS 1-9: 6
10. IF YOU CHECKED OFF ANY PROBLEMS, HOW DIFFICULT HAVE THESE PROBLEMS MADE IT FOR YOU TO DO YOUR WORK, TAKE CARE OF THINGS AT HOME, OR GET ALONG WITH OTHER PEOPLE: SOMEWHAT DIFFICULT

## 2024-06-21 ASSESSMENT — SOCIAL DETERMINANTS OF HEALTH (SDOH): HOW OFTEN DO YOU GET TOGETHER WITH FRIENDS OR RELATIVES?: MORE THAN THREE TIMES A WEEK

## 2024-06-21 NOTE — PROGRESS NOTES
Preventive Care Visit  [facility]  Pancho Mckeon MD, MD, Family Medicine  Jun 21, 2024      Trenton was seen today for recheck medication and wellness visit.    Diagnoses and all orders for this visit:    Medicare annual wellness visit, initial    Vitamin B12 deficiency (non anemic)  -     Vitamin B12; Future  -     Vitamin B12    Iron deficiency  -     Iron and iron binding capacity; Future  -     Ferritin; Future  -     Iron and iron binding capacity  -     Ferritin    Restless legs syndrome (RLS)  -     Adult Sleep Eval & Management  Referral; Future    Chronic fatigue    Snoring  -     Adult Sleep Eval & Management  Referral; Future    Lupus anticoagulant disorder (H24)    Environmental allergies    Prediabetes  -     Hemoglobin A1c    Post-menopausal  -     DEXA HIP/PELVIS/SPINE - Future; Future    Chronic gastritis without bleeding, unspecified gastritis type  -     Comprehensive metabolic panel (BMP + Alb, Alk Phos, ALT, AST, Total. Bili, TP); Future  -     CBC with platelets; Future  -     famotidine (PEPCID) 20 MG tablet; Take 1 tablet (20 mg) by mouth 2 times daily  -     Comprehensive metabolic panel (BMP + Alb, Alk Phos, ALT, AST, Total. Bili, TP)  -     CBC with platelets    Dyslipidemia  -     Lipid panel reflex to direct LDL Fasting; Future  -     Lipid panel reflex to direct LDL Fasting    Bilateral hearing loss, unspecified hearing loss type  -     Adult Audiology  Referral; Future    Other orders  -     COVID-19 12+ (2023-24) (PFIZER)           Subjective   Trenton is a 63 year old, presenting for the following:  Recheck Medication and Wellness Visit      She has restless leg syndrome.  She has iron deficiency and vitamin B12 deficiency which are treated by her neurologist who also has diagnosed and managed her restless leg and these deficiencies.  She has recently been seen by gastroenterology to address whether there is some sort of absorption  issue affecting this.  It is uncertain at this time.  She does have chronic gastritis and other digestive system symptoms.  She plans further follow-up.  She does prefer to have dicyclomine on hand as it does help with symptoms when needed.  She continues to take an H2 blocker.  She reports no significant change in digestive system symptoms at this point in time.  We discussed recheck of laboratory testing.  Discussed referral to sleep specialist to help further address restless legs but also there are other sleep concerns including more persistent snoring and fatigue.  She does not have a diagnosis of obstructive sleep apnea.  She has a history of lupus anticoagulant and is chronically anticoagulated.      We discussed appropriate immunizations.  We discussed breast cancer screening recommending yearly mammography.  We reviewed that she will come due for colon cancer screening in October.  Discussed bone density screening and will arrange for a DEXA scan.  We discussed hearing loss and agreed to referral to audiology.  She declines a vision exam, sees eye care provider regularly.        Additional Questions   Roomed by [facility]        Health Care Directive  Patient does not have a Health Care Directive or Living Will: Advance Directive received and scanned. Click on Code in the patient header to view.      Do you have a current opioid prescription? no  Do you use any other controlled substances or medications that are not prescribed by a provider?  no            6/21/2024   General Health   How would you rate your overall physical health? Good   Feel stress (tense, anxious, or unable to sleep) Not at all            6/21/2024   Nutrition   Diet: Regular (no restrictions)            6/21/2024   Exercise   Days per week of moderate/strenous exercise 6 days   Average minutes spent exercising at this level 40 min            6/21/2024   Social Factors   Frequency of gathering with friends or relatives More than three times  a week   Worry food won't last until get money to buy more No   Food not last or not have enough money for food? No   Do you have housing? (Housing is defined as stable permanent housing and does not include staying ouside in a car, in a tent, in an abandoned building, in an overnight shelter, or couch-surfing.) Yes   Are you worried about losing your housing? No   Lack of transportation? No   Unable to get utilities (heat,electricity)? No            6/21/2024   Fall Risk   Fallen 2 or more times in the past year? No    No   Trouble with walking or balance? No    No       Multiple values from one day are sorted in reverse-chronological order          6/21/2024   Activities of Daily Living- Home Safety   Needs help with the following daily activites None of the above   Safety concerns in the home None of the above            6/21/2024   Dental   Dentist two times every year? Yes            6/21/2024   Hearing Screening   Hearing concerns? (!) I FEEL THAT PEOPLE ARE MUMBLING OR NOT SPEAKING CLEARLY.    (!) IT'S HARD TO FOLLOW A CONVERSATION IN A NOISY RESTAURANT OR CROWDED ROOM.       Multiple values from one day are sorted in reverse-chronological order         6/21/2024   Driving Risk Screening   Patient/family members have concerns about driving No            6/21/2024   General Alertness/Fatigue Screening   Have you been more tired than usual lately? (!) YES            6/21/2024   Urinary Incontinence Screening   Bothered by leaking urine in past 6 months No            6/21/2024   TB Screening   Were you born outside of the [place]? No          Today's PHQ-9 Score:       6/21/2024     8:40 AM   PHQ-9 SCORE   PHQ-9 Total Score MyChart 6 (Mild depression)   PHQ-9 Total Score 6         6/21/2024   Substance Use   If I could quit smoking, I would Somewhat agree   I want to quit somking, worry about health affects Neutral   Willing to make a plan to quit smoking Somewhat agree   Willing to cut down before quitting Neutral    Alcohol more than 3/day or more than 7/wk No   Do you have a current opioid prescription? No   How severe/bad is pain from 1 to 10? 0/10 (No Pain)   Do you use any other substances recreationally? No        Social History     Tobacco Use    Smoking status: Every Day     Current packs/day: 0.50     Average packs/day: 0.5 packs/day for 40.0 years (20.0 ttl pk-yrs)     Types: Cigarettes    Smokeless tobacco: Never   Vaping Use    Vaping status: Never Used   Substance Use Topics    Alcohol use: Yes     Comment: Occasional    Drug use: Never           8/11/2023   LAST FHS-7 RESULTS   1st degree relative breast or ovarian cancer Yes   Any relative bilateral breast cancer No   Any male have breast cancer No   Any ONE woman have BOTH breast AND ovarian cancer No   Any woman with breast cancer before 50yrs Yes   2 or more relatives with breast AND/OR ovarian cancer Yes   2 or more relatives with breast AND/OR bowel cancer Yes                     6/21/2024   One time HIV Screening   Previous HIV test? No        History of abnormal Pap smear:        ASCVD Risk   The 10-year ASCVD risk score (Fabiano NAVA, et al., 2019) is: 12%    Values used to calculate the score:      Age: 63 years      Sex: Female      Is Non- : No      Diabetic: No      Tobacco smoker: Yes      Systolic Blood Pressure: 128 mmHg      Is BP treated: No      HDL Cholesterol: 46 mg/dL      Total Cholesterol: 240 mg/dL            Reviewed and updated as needed this visit by Provider                      Current providers sharing in care for this patient include:  Patient Care Team:  Pancho Mckeon MD as PCP - General (Family Medicine)  Pancho Mckeon MD as Assigned PCP    The following health maintenance items are reviewed in Epic and correct as of today:  Health Maintenance   Topic Date Due    DEXA  Never done    DEPRESSION ACTION PLAN  Never done    HIV SCREENING  Never done    RSV VACCINE (Pregnancy & 60+) (1 - 1-dose  "60+ series) Never done    Pneumococcal Vaccine: 65+ Years (2 of 2 - PCV) 09/15/2021    NICOTINE/TOBACCO CESSATION COUNSELING Q 1 YR  10/20/2022    LIPID  10/20/2022    ANNUAL REVIEW OF HM ORDERS  06/27/2023    ASTHMA ACTION PLAN  06/27/2023    MEDICARE ANNUAL WELLNESS VISIT  07/08/2023    DEPRESSION 12 MO INDEX REPEAT PHQ-9  07/05/2024    ASTHMA CONTROL TEST  08/27/2024    INFLUENZA VACCINE (Season Ended) 09/01/2024    COLORECTAL CANCER SCREENING  10/15/2024    COVID-19 Vaccine (5 - 2023-24 season) 10/21/2024    PHQ-9  12/21/2024    FALL RISK ASSESSMENT  06/21/2025    MAMMO SCREENING  08/11/2025    GLUCOSE  02/27/2027    ADVANCE CARE PLANNING  06/21/2029    DTAP/TDAP/TD IMMUNIZATION (3 - Td or Tdap) 11/18/2029    HEPATITIS C SCREENING  Completed    ZOSTER IMMUNIZATION  Completed    IPV IMMUNIZATION  Aged Out    HPV IMMUNIZATION  Aged Out    MENINGITIS IMMUNIZATION  Aged Out    RSV MONOCLONAL ANTIBODY  Aged Out    LUNG CANCER SCREENING  Discontinued       Complete review of systems is obtained.  Other than the specific considerations noted above complete review of systems is negative.       Objective    Exam  /76   Pulse 81   Temp 98.4  F (36.9  C)   Resp 18   Ht 1.6 m (5' 3\")   Wt 66.4 kg (146 lb 4.8 oz)   LMP  (LMP Unknown)   SpO2 98%   BMI 25.92 kg/m     Estimated body mass index is 25.92 kg/m  as calculated from the following:    Height as of this encounter: 1.6 m (5' 3\").    Weight as of this encounter: 66.4 kg (146 lb 4.8 oz).    Physical Exam  Complete review of systems is obtained.  Other than the specific considerations noted above complete review of systems is negative.        General Appearance:    Alert, cooperative, no distress   Eyes:   No scleral icterus or conjunctival irritation       Ears:    Normal TM's and external ear canals, both ears   Throat:   Lips, mucosa, and tongue normal; teeth and gums normal   Neck:   Supple, symmetrical, trachea midline, no adenopathy;        thyroid:  No " enlargement/tenderness/nodules   Lungs:     Clear to auscultation bilaterally, respirations unlabored, no wheezes or crackles   Heart:    Regular rate and rhythm,  No murmur   Extremities:  No edema, no joint swelling or redness, no evidence of any injuries   Skin:  No concerning skin findings, no suspicious moles, no rashes   Neurologic:  On gross examination there is no motor or sensory deficit.  Patient walks with a normal gait                     6/21/2024   Mini Cog   Clock Draw Score 2 Normal   3 Item Recall 3 objects recalled   Mini Cog Total Score 5            Vision Screen  Reason Vision Screen Not Completed: Patient had exam in last 12 months      Signed Electronically by: Pancho Mckeon MD, MD    Answers submitted by the patient for this visit:  Patient Health Questionnaire (Submitted on 6/21/2024)  If you checked off any problems, how difficult have these problems made it for you to do your work, take care of things at home, or get along with other people?: Somewhat difficult  PHQ9 TOTAL SCORE: 6

## 2024-07-10 ENCOUNTER — ANCILLARY PROCEDURE (OUTPATIENT)
Dept: BONE DENSITY | Facility: CLINIC | Age: 64
End: 2024-07-10
Attending: FAMILY MEDICINE
Payer: MEDICARE

## 2024-07-10 DIAGNOSIS — Z78.0 POST-MENOPAUSAL: ICD-10-CM

## 2024-07-10 PROCEDURE — 77089 TBS DXA CAL W/I&R FX RISK: CPT | Performed by: PHYSICIAN ASSISTANT

## 2024-07-10 PROCEDURE — 77080 DXA BONE DENSITY AXIAL: CPT | Mod: TC | Performed by: PHYSICIAN ASSISTANT

## 2024-07-17 ENCOUNTER — TRANSFERRED RECORDS (OUTPATIENT)
Dept: HEALTH INFORMATION MANAGEMENT | Facility: CLINIC | Age: 64
End: 2024-07-17
Payer: MEDICARE

## 2024-07-19 DIAGNOSIS — E53.8 VITAMIN B12 DEFICIENCY (NON ANEMIC): ICD-10-CM

## 2024-07-19 DIAGNOSIS — D68.62 LUPUS ANTICOAGULANT DISORDER (H): ICD-10-CM

## 2024-07-19 DIAGNOSIS — F41.9 ANXIETY: ICD-10-CM

## 2024-07-19 DIAGNOSIS — K29.50 CHRONIC GASTRITIS WITHOUT BLEEDING, UNSPECIFIED GASTRITIS TYPE: Primary | ICD-10-CM

## 2024-07-19 RX ORDER — SUCRALFATE ORAL 1 G/10ML
1 SUSPENSION ORAL 2 TIMES DAILY PRN
Qty: 473 ML | Refills: 3 | Status: SHIPPED | OUTPATIENT
Start: 2024-07-19

## 2024-07-19 RX ORDER — DIAZEPAM 10 MG
TABLET ORAL
Qty: 15 TABLET | Refills: 0 | Status: SHIPPED | OUTPATIENT
Start: 2024-07-19 | End: 2024-08-22

## 2024-07-19 RX ORDER — RIVAROXABAN 20 MG/1
20 TABLET, FILM COATED ORAL DAILY
Qty: 30 TABLET | Refills: 11 | Status: SHIPPED | OUTPATIENT
Start: 2024-07-19

## 2024-07-19 RX ORDER — CYANOCOBALAMIN 1000 UG/ML
1 INJECTION, SOLUTION INTRAMUSCULAR; SUBCUTANEOUS
Qty: 10 ML | Refills: 1 | Status: SHIPPED | OUTPATIENT
Start: 2024-07-19

## 2024-07-19 NOTE — TELEPHONE ENCOUNTER
Pharmacy change.    [facility]     Patient reported not taking sucralfate in February. Med was prescribed by ED for abdominal pain in January. Please confirm with patient that she is not taking the medication. Deny request if not, otherwise route back to refill pool    Tatiana Gaytan RN  [facility]

## 2024-07-19 NOTE — TELEPHONE ENCOUNTER
Called patient to verify if this refill was need for sucralfate, pt states she does not need this and not sure why the pharmacy is requesting this.    Thomas Castro RN  [facility]

## 2024-08-14 ENCOUNTER — TRANSFERRED RECORDS (OUTPATIENT)
Dept: HEALTH INFORMATION MANAGEMENT | Facility: CLINIC | Age: 64
End: 2024-08-14
Payer: MEDICARE

## 2024-08-21 ENCOUNTER — OFFICE VISIT (OUTPATIENT)
Dept: AUDIOLOGY | Facility: CLINIC | Age: 64
End: 2024-08-21
Payer: MEDICARE

## 2024-08-21 ENCOUNTER — HOSPITAL ENCOUNTER (OUTPATIENT)
Dept: MAMMOGRAPHY | Facility: CLINIC | Age: 64
Discharge: HOME | End: 2024-08-21
Attending: FAMILY MEDICINE | Admitting: FAMILY MEDICINE
Payer: MEDICARE

## 2024-08-21 DIAGNOSIS — H93.13 TINNITUS OF BOTH EARS: Primary | ICD-10-CM

## 2024-08-21 DIAGNOSIS — H91.93 BILATERAL HEARING LOSS, UNSPECIFIED HEARING LOSS TYPE: ICD-10-CM

## 2024-08-21 DIAGNOSIS — Z12.31 VISIT FOR SCREENING MAMMOGRAM: ICD-10-CM

## 2024-08-21 DIAGNOSIS — H91.92 HEARING LOSS OF LEFT EAR, UNSPECIFIED HEARING LOSS TYPE: ICD-10-CM

## 2024-08-21 PROCEDURE — 92550 TYMPANOMETRY & REFLEX THRESH: CPT | Mod: 52 | Performed by: AUDIOLOGIST

## 2024-08-21 PROCEDURE — 77063 BREAST TOMOSYNTHESIS BI: CPT

## 2024-08-21 PROCEDURE — 92557 COMPREHENSIVE HEARING TEST: CPT | Performed by: AUDIOLOGIST

## 2024-08-21 NOTE — PROGRESS NOTES
AUDIOLOGY REPORT    SUBJECTIVE:  Trenton Milian is a 64 year old female who was seen in the Audiology Clinic at the [facility] for audiologic evaluation, referred by Pancho Mckeon M.D. The patient reported decreased hearing ability in noisy places or in group conversations. She is bothered by bilateral tinnitus (not pulsatile, but it is constant and distracting), and bilateral aural fullness. She denied dizziness, otalgia, otorrhea, recent illness, or history of significant noise exposure.     OBJECTIVE:  Abuse Screening:  Do you feel unsafe at home or work/school? No  Do you feel threatened by someone? No  Does anyone try to keep you from having contact with others, or doing things outside of your home? No  Physical signs of abuse present? No     Fall Risk Screen:  1. Have you fallen two or more times in the past year? No  2. Have you fallen and had an injury in the past year? No    Otoscopic exam indicated the left ear canal was clear; the right ear had nonoccluding cerumen deep within the ear canal.     Pure Tone Thresholds assessed using conventional audiometry with good  reliability from 250-8000 Hz bilaterally using insert earphones and circumaural headphones.     RIGHT: normal/borderline normal hearing sensitivity for 250-8000 Hz    LEFT:   normal/borderline normal hearing sensitivity for 250-6000 Hz, with mild, likely sensorineural hearing loss affecting 8000 Hz only.    Tympanogram:    RIGHT: normal eardrum mobility    LEFT:   normal eardrum mobility    Reflexes for 1000 Hz (reported by stimulus ear):  RIGHT: Ipsilateral is present at normal levels  RIGHT: Contralateral could not be assessed due to equipment issues  LEFT:   Ipsilateral is present at normal levels  LEFT:   Contralateral could not be assessed due to equipment issues      Speech Reception Threshold:    RIGHT: 20 dB HL    LEFT:   25 dB HL  Word Recognition Score:     RIGHT: 100% at 65 dB HL using NU-6 recorded word  list.    LEFT:   100% at 65 dB HL using NU-6 recorded word list.      ASSESSMENT:     ICD-10-CM    1. Tinnitus of both ears  H93.13       2. Bilateral hearing loss, unspecified hearing loss type  H91.93 Adult Audiology  Referral      3. Hearing loss of left ear, unspecified hearing loss type  H91.92           Today s results were discussed with the patient in detail. Appropriate communication strategies were discussed at length, as were reasonable expectations regarding hearing at a distance, in noisy environments, or when attention is diverted elsewhere. Common tinnitus triggers and management strategies were discussed at length.    PLAN:  Patient was counseled regarding hearing loss and impact on communication; she is not yet a candidate for amplification at this time in either ear. Retest hearing per medical management or patient concern; re-evaluation in 1-2 years is recommended. Wear hearing protection consistently in noise to preserve residual hearing sensitivity and to minimize the effects of noise on tinnitus. Ms. Milian expressed verbal understanding of this information and plan. Please call this clinic with questions regarding these results or recommendations.        Juan Carlos Brito, [facility]  [place] Licensed Audiologist [id number]

## 2024-08-22 ENCOUNTER — TRANSFERRED RECORDS (OUTPATIENT)
Dept: HEALTH INFORMATION MANAGEMENT | Facility: CLINIC | Age: 64
End: 2024-08-22
Payer: MEDICARE

## 2024-08-22 DIAGNOSIS — F41.9 ANXIETY: ICD-10-CM

## 2024-08-22 RX ORDER — DIAZEPAM 10 MG
TABLET ORAL
Qty: 15 TABLET | Refills: 0 | Status: SHIPPED | OUTPATIENT
Start: 2024-08-22

## 2024-08-27 ENCOUNTER — OFFICE VISIT (OUTPATIENT)
Dept: URGENT CARE | Facility: URGENT CARE | Age: 64
End: 2024-08-27
Payer: MEDICARE

## 2024-08-27 VITALS
TEMPERATURE: 97.4 F | DIASTOLIC BLOOD PRESSURE: 64 MMHG | HEART RATE: 79 BPM | OXYGEN SATURATION: 98 % | SYSTOLIC BLOOD PRESSURE: 104 MMHG

## 2024-08-27 DIAGNOSIS — R30.0 DYSURIA: ICD-10-CM

## 2024-08-27 DIAGNOSIS — N30.00 ACUTE CYSTITIS WITHOUT HEMATURIA: Primary | ICD-10-CM

## 2024-08-27 LAB
ALBUMIN UR-MCNC: NEGATIVE MG/DL
APPEARANCE UR: CLEAR
BACTERIA #/AREA URNS HPF: ABNORMAL /HPF
BILIRUB UR QL STRIP: NEGATIVE
COLOR UR AUTO: YELLOW
GLUCOSE UR STRIP-MCNC: NEGATIVE MG/DL
HGB UR QL STRIP: ABNORMAL
KETONES UR STRIP-MCNC: NEGATIVE MG/DL
LEUKOCYTE ESTERASE UR QL STRIP: ABNORMAL
NITRATE UR QL: NEGATIVE
PH UR STRIP: 5.5 [PH] (ref 5–7)
RBC #/AREA URNS AUTO: ABNORMAL /HPF
SP GR UR STRIP: >=1.03 (ref 1–1.03)
SQUAMOUS #/AREA URNS AUTO: ABNORMAL /LPF
UROBILINOGEN UR STRIP-ACNC: 0.2 E.U./DL
WBC #/AREA URNS AUTO: ABNORMAL /HPF

## 2024-08-27 PROCEDURE — 81001 URINALYSIS AUTO W/SCOPE: CPT

## 2024-08-27 PROCEDURE — 99213 OFFICE O/P EST LOW 20 MIN: CPT

## 2024-08-27 PROCEDURE — 87086 URINE CULTURE/COLONY COUNT: CPT

## 2024-08-27 RX ORDER — NITROFURANTOIN 25; 75 MG/1; MG/1
100 CAPSULE ORAL 2 TIMES DAILY
Qty: 10 CAPSULE | Refills: 0 | Status: SHIPPED | OUTPATIENT
Start: 2024-08-27 | End: 2024-09-01

## 2024-08-27 NOTE — PROGRESS NOTES
ASSESSMENT:   (N30.00) Acute cystitis without hematuria  (primary encounter diagnosis)  Plan: nitroFURantoin macrocrystal-monohydrate         (MACROBID) 100 MG capsule    (R30.0) Dysuria  Plan: UA Macroscopic with reflex to Microscopic and         Culture - Clinic Collect, Urine Microscopic         Exam, Urine Culture    PLAN:  Informed the patient that the urine test shows a urinary tract infection.  Urinary tract infection patient instructions discussed and provided.  Informed the patient to take the antibiotic as prescribed and finish the full course even if symptoms improve. We discussed trying over-the-counter Azo and/or cranberry supplements for the urinary symptoms.  Informed the patient to return to clinic with any new or worsening symptoms.  Patient acknowledged their understanding of the above plan.    The use of Dragon/PowerMic dictation services may have been used to construct the content in this note; any grammatical or spelling errors are non-intentional. Please contact the author of this note directly if you are in need of any clarification.      Rodrick Steele, APRN CNP     SUBJECTIVE:  Trenton Milian is a 64 year old female who  presents today for a possible UTI. Symptoms of dysuria, urgency, frequency, and burning have been going on for 1day.  Hematuria no.  gradual onsetand moderate and severe.  There is no history of fever, chills or vomiting.  This patient does have a history of urinary tract infections. Patient denies vaginal symptoms or STD concerns.    ROS:   Negative except noted above.    OBJECTIVE:  /64 (BP Location: Right arm, Patient Position: Sitting, Cuff Size: Adult Regular)   Pulse 79   Temp 97.4  F (36.3  C) (Tympanic)   LMP  (LMP Unknown)   SpO2 98%   GENERAL APPEARANCE: healthy, alert and no distress  SKIN: no suspicious lesions or rashes    UA: positive for WBC's, positive for RBC's, positive for leukocytes, and positive for bacturia

## 2024-08-28 LAB — BACTERIA UR CULT: NORMAL

## 2024-08-29 ENCOUNTER — TRANSFERRED RECORDS (OUTPATIENT)
Dept: HEALTH INFORMATION MANAGEMENT | Facility: CLINIC | Age: 64
End: 2024-08-29
Payer: MEDICARE

## 2024-09-12 ENCOUNTER — TRANSFERRED RECORDS (OUTPATIENT)
Dept: HEALTH INFORMATION MANAGEMENT | Facility: CLINIC | Age: 64
End: 2024-09-12
Payer: MEDICARE

## 2024-09-15 ENCOUNTER — PATIENT OUTREACH (OUTPATIENT)
Dept: CARE COORDINATION | Facility: CLINIC | Age: 64
End: 2024-09-15
Payer: MEDICARE

## 2024-09-17 ENCOUNTER — TELEPHONE (OUTPATIENT)
Dept: FAMILY MEDICINE | Facility: CLINIC | Age: 64
End: 2024-09-17
Payer: MEDICARE

## 2024-09-17 NOTE — TELEPHONE ENCOUNTER
General Call    Contacts       Contact Date/Time Type Contact Phone/Fax    09/17/2024 11:56 AM CDT Phone (Incoming) Kika [phone]          Reason for Call:  Holding bridge orders     What are your questions or concerns:  [facility] is calling and asking to request holding bridge orders. 2 day hold on Xarelto start 09/27/2024 prior to colonoscopy on the 09/29/2024 and if unable to do the 2 day hold will request most recent creatinine lab. Please advise and call [facility] back with up dates please and thank you.      Could we send this information to you in [employer] or would you prefer to receive a phone call?:   No preference   Okay to leave a detailed message?: Yes at Other phone number:  [phone]

## 2024-09-18 NOTE — TELEPHONE ENCOUNTER
Spoke with Jasmyne and RN at [facility] and gave her verbal ok per Dr. Mckeon for 2 day hold on the xarelto.     Nena APPLE CMA on September 18, 2024 at 3:53 PM

## 2024-10-16 ENCOUNTER — TRANSFERRED RECORDS (OUTPATIENT)
Dept: HEALTH INFORMATION MANAGEMENT | Facility: CLINIC | Age: 64
End: 2024-10-16
Payer: MEDICARE

## 2024-10-28 DIAGNOSIS — F41.9 ANXIETY: ICD-10-CM

## 2024-10-28 DIAGNOSIS — Z91.09 ENVIRONMENTAL ALLERGIES: ICD-10-CM

## 2024-10-29 ENCOUNTER — TRANSFERRED RECORDS (OUTPATIENT)
Dept: HEALTH INFORMATION MANAGEMENT | Facility: CLINIC | Age: 64
End: 2024-10-29
Payer: MEDICARE

## 2024-10-29 RX ORDER — DIAZEPAM 10 MG/1
TABLET ORAL
Qty: 15 TABLET | Refills: 0 | Status: SHIPPED | OUTPATIENT
Start: 2024-10-29

## 2024-10-29 RX ORDER — CETIRIZINE HYDROCHLORIDE 10 MG/1
10 TABLET ORAL DAILY
Qty: 90 TABLET | Refills: 3 | Status: SHIPPED | OUTPATIENT
Start: 2024-10-29

## 2024-11-13 ENCOUNTER — OFFICE VISIT (OUTPATIENT)
Dept: URGENT CARE | Facility: URGENT CARE | Age: 64
End: 2024-11-13
Payer: MEDICARE

## 2024-11-13 VITALS
BODY MASS INDEX: 25.33 KG/M2 | OXYGEN SATURATION: 95 % | SYSTOLIC BLOOD PRESSURE: 110 MMHG | RESPIRATION RATE: 18 BRPM | WEIGHT: 143 LBS | TEMPERATURE: 98.2 F | DIASTOLIC BLOOD PRESSURE: 71 MMHG | HEART RATE: 85 BPM

## 2024-11-13 DIAGNOSIS — N30.00 ACUTE CYSTITIS WITHOUT HEMATURIA: Primary | ICD-10-CM

## 2024-11-13 DIAGNOSIS — N39.9 URINARY PROBLEM IN FEMALE: ICD-10-CM

## 2024-11-13 LAB
ALBUMIN UR-MCNC: NEGATIVE MG/DL
APPEARANCE UR: CLEAR
BACTERIA #/AREA URNS HPF: ABNORMAL /HPF
BILIRUB UR QL STRIP: NEGATIVE
COLOR UR AUTO: YELLOW
GLUCOSE UR STRIP-MCNC: NEGATIVE MG/DL
HGB UR QL STRIP: ABNORMAL
KETONES UR STRIP-MCNC: NEGATIVE MG/DL
LEUKOCYTE ESTERASE UR QL STRIP: ABNORMAL
NITRATE UR QL: NEGATIVE
PH UR STRIP: 5.5 [PH] (ref 5–8)
RBC #/AREA URNS AUTO: ABNORMAL /HPF
SP GR UR STRIP: 1.02 (ref 1–1.03)
SQUAMOUS #/AREA URNS AUTO: ABNORMAL /LPF
UROBILINOGEN UR STRIP-ACNC: 0.2 E.U./DL
WBC #/AREA URNS AUTO: ABNORMAL /HPF

## 2024-11-13 PROCEDURE — 99213 OFFICE O/P EST LOW 20 MIN: CPT | Performed by: FAMILY MEDICINE

## 2024-11-13 PROCEDURE — 81001 URINALYSIS AUTO W/SCOPE: CPT

## 2024-11-13 PROCEDURE — 87086 URINE CULTURE/COLONY COUNT: CPT | Performed by: FAMILY MEDICINE

## 2024-11-13 RX ORDER — NITROFURANTOIN 25; 75 MG/1; MG/1
100 CAPSULE ORAL 2 TIMES DAILY
Qty: 14 CAPSULE | Refills: 0 | Status: SHIPPED | OUTPATIENT
Start: 2024-11-13 | End: 2024-11-20

## 2024-11-14 LAB — BACTERIA UR CULT: NORMAL

## 2024-11-14 NOTE — PROGRESS NOTES
Assessment/Plan:   1. Urinary problem in female (Primary)  ***  - UA Macroscopic with reflex to Microscopic and Culture - Clinic Collect  - UA Microscopic with Reflex to Culture  - Urine Culture    2. Acute cystitis without hematuria  ***  - nitroFURantoin macrocrystal-monohydrate (MACROBID) 100 MG capsule; Take 1 capsule (100 mg) by mouth 2 times daily for 7 days.  Dispense: 14 capsule; Refill: 0      I discussed red flag symptoms, return precautions to clinic/ER and follow up care with patient/parent.  Expected clinical course, symptomatic cares advised. Questions answered. Patient/parent amenable with plan.        Subjective:     Trenton Milian is a 64 year old female who presents ***    Allergies   Allergen Reactions    Adhesive Tape Itching    Amoxicillin     Atorvastatin Muscle Pain (Myalgia)    Clarithromycin     Clavulanic Acid     Clindamycin Unknown    Fentanyl Unknown     itchy    Hydroxyzine     Levofloxacin     Moviprep [Peg-Kcl-Nacl-Nasulf-Na Asc-C]     No Clinical Screening - See Comments     Oxycodone     Penicillins Unknown     itchy    Propoxyphene Itching    Septra [Sulfamethoxazole-Trimethoprim] Unknown     itchy    Sulfamethoxazole     Tramadol Unknown     itchy    Trimethoprim     Zyban [Bupropion] Unknown     Personality Change    Acetaminophen Palpitations    Altex Rash    Cephalosporins Rash    Oxycodone-Acetaminophen Rash     Current Outpatient Medications   Medication Sig Dispense Refill    betamethasone dipropionate (DIPROSONE) 0.05 % external cream Apply topically daily      CERAVE Crea cream [CERAVE CREA CREAM]       cetirizine (ZYRTEC) 10 MG tablet TAKE 1 TABLET DAILY 90 tablet 3    cyanocobalamin (CYANOCOBALAMIN) 1000 MCG/ML injection Inject 1 mL (1,000 mcg) into the muscle every 30 days 10 mL 1    diazepam (VALIUM) 10 MG tablet TAKE 1 TABLET EVERY 6 HOURS AS NEEDED FOR ANXIETY 15 tablet 0    famotidine (PEPCID) 20 MG tablet Take 1 tablet (20 mg) by mouth 2 times daily 180 tablet 1     gabapentin (NEURONTIN) 300 MG capsule Take 1 capsule (300 mg) by mouth daily 90 capsule 1    latanoprost (XALATAN) 0.005 % ophthalmic solution Place 1 drop into both eyes daily      nitroFURantoin macrocrystal-monohydrate (MACROBID) 100 MG capsule Take 1 capsule (100 mg) by mouth 2 times daily for 7 days. 14 capsule 0    omeprazole (PRILOSEC) 20 MG DR capsule TAKE 1 CAPSULE DAILY BEFORE BREAKFAST 90 capsule 3    PROAIR  (90 Base) MCG/ACT inhaler Inhale 2 puffs into the lungs every 4 hours as needed for shortness of breath / dyspnea or wheezing 18 g 3    sertraline (ZOLOFT) 25 MG tablet Take 1 tablet (25 mg) by mouth daily 90 tablet 3    sucralfate (CARAFATE) 1 GM/10ML suspension Take 10 mLs (1 g) by mouth 2 times daily as needed 473 mL 3    XARELTO ANTICOAGULANT 20 MG TABS tablet TAKE 1 TABLET DAILY 30 tablet 11    dicyclomine (BENTYL) 20 MG tablet Take 20 mg by mouth 4 times daily as needed (Patient not taking: Reported on 11/13/2024)      triamcinolone (KENALOG) 0.1 % external cream Apply topically 2 times daily (Patient not taking: Reported on 11/13/2024) 80 g 1     No current facility-administered medications for this visit.     Patient Active Problem List   Diagnosis    History of recurrent deep vein thrombosis (DVT)    Lupus anticoagulant disorder (H)    Esophageal stricture    Anxiety disorder, unspecified    History of gastric ulcer    Hyperlipidemia, unspecified    Weight gain    Mild intermittent asthma without complication    Vitamin B12 deficiency    Prediabetes    Hypertrophy of bone    CMC arthritis    Preoperative examination    Leukocytosis, unspecified type    Pruritic disorder    Abnormal glucose level    Backache    Burping    Iron deficiency    Tinnitus of both ears       Objective:     /71   Pulse 85   Temp 98.2  F (36.8  C) (Oral)   Resp 18   Wt 64.9 kg (143 lb)   LMP  (LMP Unknown)   SpO2 95%   BMI 25.33 kg/m      Physical        Results for orders placed or performed in  visit on 11/13/24   UA Macroscopic with reflex to Microscopic and Culture - Clinic Collect     Status: Abnormal    Specimen: Urine, Clean Catch   Result Value Ref Range    Color Urine Yellow Colorless, Straw, Light Yellow, Yellow    Appearance Urine Clear Clear    Glucose Urine Negative Negative mg/dL    Bilirubin Urine Negative Negative    Ketones Urine Negative Negative mg/dL    Specific Gravity Urine 1.020 1.005 - 1.030    Blood Urine Small (A) Negative    pH Urine 5.5 5.0 - 8.0    Protein Albumin Urine Negative Negative mg/dL    Urobilinogen Urine 0.2 0.2, 1.0 E.U./dL    Nitrite Urine Negative Negative    Leukocyte Esterase Urine Small (A) Negative   UA Microscopic with Reflex to Culture     Status: Abnormal   Result Value Ref Range    Bacteria Urine Few (A) None Seen /HPF    RBC Urine 0-2 0-2 /HPF /HPF    WBC Urine 10-25 (A) 0-5 /HPF /HPF    Squamous Epithelials Urine Few (A) None Seen /LPF       This note has been dictated in part using voice recognition software.  Any grammatical or context distortions are unintentional and inherent to the software.  Please feel free to contact me directly for clarification if needed.

## 2024-12-05 ENCOUNTER — MEDICAL CORRESPONDENCE (OUTPATIENT)
Dept: HEALTH INFORMATION MANAGEMENT | Facility: CLINIC | Age: 64
End: 2024-12-05
Payer: MEDICARE

## 2024-12-05 ENCOUNTER — TRANSFERRED RECORDS (OUTPATIENT)
Dept: HEALTH INFORMATION MANAGEMENT | Facility: CLINIC | Age: 64
End: 2024-12-05
Payer: MEDICARE

## 2024-12-12 PROBLEM — H40.023: Status: ACTIVE | Noted: 2024-12-12

## 2024-12-12 PROBLEM — K30 INDIGESTION: Status: ACTIVE | Noted: 2024-12-12

## 2024-12-12 PROBLEM — H02.054 TRICHIASIS OF LEFT UPPER EYELID: Status: ACTIVE | Noted: 2024-12-12

## 2024-12-12 PROBLEM — R19.8 ALTERED BOWEL FUNCTION: Status: ACTIVE | Noted: 2024-12-12

## 2024-12-12 PROBLEM — H20.9 IRITIS OF LEFT EYE: Status: ACTIVE | Noted: 2024-12-12

## 2024-12-12 PROBLEM — H40.009 GLAUCOMA SUSPECT: Status: ACTIVE | Noted: 2024-12-12

## 2024-12-12 PROBLEM — H52.10 MYOPIA: Status: ACTIVE | Noted: 2019-12-31

## 2024-12-20 ENCOUNTER — ANCILLARY PROCEDURE (OUTPATIENT)
Dept: GENERAL RADIOLOGY | Facility: CLINIC | Age: 64
End: 2024-12-20
Attending: FAMILY MEDICINE
Payer: MEDICARE

## 2024-12-20 DIAGNOSIS — H20.9 IRITIS: ICD-10-CM

## 2024-12-20 PROCEDURE — 71046 X-RAY EXAM CHEST 2 VIEWS: CPT | Mod: TC | Performed by: RADIOLOGY

## 2025-01-06 ENCOUNTER — OFFICE VISIT (OUTPATIENT)
Dept: URGENT CARE | Facility: URGENT CARE | Age: 65
End: 2025-01-06
Payer: MEDICARE

## 2025-01-06 VITALS
TEMPERATURE: 98.5 F | OXYGEN SATURATION: 96 % | SYSTOLIC BLOOD PRESSURE: 126 MMHG | DIASTOLIC BLOOD PRESSURE: 72 MMHG | RESPIRATION RATE: 18 BRPM | HEART RATE: 113 BPM

## 2025-01-06 DIAGNOSIS — J06.9 VIRAL URI: ICD-10-CM

## 2025-01-06 DIAGNOSIS — J45.21 MILD INTERMITTENT ASTHMA WITH ACUTE EXACERBATION: Primary | ICD-10-CM

## 2025-01-06 PROCEDURE — 99214 OFFICE O/P EST MOD 30 MIN: CPT | Performed by: PHYSICIAN ASSISTANT

## 2025-01-06 RX ORDER — ALBUTEROL SULFATE 90 UG/1
2 INHALANT RESPIRATORY (INHALATION) EVERY 6 HOURS PRN
Qty: 18 G | Refills: 0 | Status: SHIPPED | OUTPATIENT
Start: 2025-01-06

## 2025-01-06 RX ORDER — PREDNISONE 20 MG/1
40 TABLET ORAL DAILY
Qty: 10 TABLET | Refills: 0 | Status: SHIPPED | OUTPATIENT
Start: 2025-01-06 | End: 2025-01-11

## 2025-01-06 NOTE — PROGRESS NOTES
Chief Complaint   Patient presents with    Cough     Has had cough congestion for 12 days        ASSESSMENT/PLAN:  Trenton was seen today for cough.    Diagnoses and all orders for this visit:    Mild intermittent asthma with acute exacerbation  -     albuterol (PROAIR HFA/PROVENTIL HFA/VENTOLIN HFA) 108 (90 Base) MCG/ACT inhaler; Inhale 2 puffs into the lungs every 6 hours as needed for shortness of breath or wheezing.  -     predniSONE (DELTASONE) 20 MG tablet; Take 2 tablets (40 mg) by mouth daily for 5 days.    Viral URI    Quite wheezy and tight today.  However, symptoms improved overall sore and low suspicious for pneumonia or bacterial etiology.  Suspect viral URI leading to asthma exacerbation.  Refilling albuterol, start prednisone.  Symptomatic cares and expected length of symptoms discussed at length and outlined in AVS  Return precautions also discussed    Jovanni Lewis PA-C      SUBJECTIVE:  Trenton is a 64 year old female who presents to urgent care with about 10 days of cough, wheeze, shortness of breath and chest tightness.  Minimal nasal congestion with sore throat.  Today she does feel better.  She feels like it is difficult to have a deep breath.  Her albuterol inhaler helps but briefly    ROS: Pertinent ROS neg other than the symptoms noted above in the HPI.     OBJECTIVE:  /72   Pulse 113   Temp 98.5  F (36.9  C) (Oral)   Resp 18   LMP  (LMP Unknown)   SpO2 96%    GENERAL: alert and no distress  EYES: Eyes grossly normal to inspection, PERRL and conjunctivae and sclerae normal  HENT: nose and mouth without ulcers or lesions  RESP: Decreased lung sounds, frequent cough, wheeze in all lung fields  CV: regular rate and rhythm, normal S1 S2, no S3 or S4, no murmur, click or rub, no peripheral edema     DIAGNOSTICS    No results found for any visits on 01/06/25.     Current Outpatient Medications   Medication Sig Dispense Refill    betamethasone dipropionate (DIPROSONE) 0.05 % external  cream Apply topically daily      CERAVE Crea cream [CERAVE CREA CREAM]       cetirizine (ZYRTEC) 10 MG tablet TAKE 1 TABLET DAILY 90 tablet 3    cyanocobalamin (CYANOCOBALAMIN) 1000 MCG/ML injection Inject 1 mL (1,000 mcg) into the muscle every 30 days 10 mL 1    diazepam (VALIUM) 10 MG tablet TAKE 1 TABLET EVERY 6 HOURS AS NEEDED FOR ANXIETY 15 tablet 0    dicyclomine (BENTYL) 20 MG tablet Take 20 mg by mouth 4 times daily as needed.      gabapentin (NEURONTIN) 300 MG capsule Take 1 capsule (300 mg) by mouth daily 90 capsule 1    iron sucrose (VENOFER) injection Inject into the vein.      latanoprost (XALATAN) 0.005 % ophthalmic solution Place 1 drop into both eyes daily      omeprazole (PRILOSEC) 20 MG DR capsule TAKE 1 CAPSULE DAILY BEFORE BREAKFAST 90 capsule 3    PROAIR  (90 Base) MCG/ACT inhaler Inhale 2 puffs into the lungs every 4 hours as needed for shortness of breath or wheezing. 18 g 3    sertraline (ZOLOFT) 25 MG tablet Take 1 tablet (25 mg) by mouth daily 90 tablet 3    triamcinolone (KENALOG) 0.1 % external cream Apply topically 2 times daily 80 g 1    XARELTO ANTICOAGULANT 20 MG TABS tablet TAKE 1 TABLET DAILY 30 tablet 11     No current facility-administered medications for this visit.      Patient Active Problem List   Diagnosis    History of recurrent deep vein thrombosis (DVT)    Lupus anticoagulant disorder (H)    Esophageal stricture    Anxiety disorder, unspecified    History of gastric ulcer    Hyperlipidemia, unspecified    Weight gain    Mild intermittent asthma without complication    Vitamin B12 deficiency    Prediabetes    Hypertrophy of bone    CMC arthritis    Preoperative examination    Leukocytosis, unspecified type    Pruritic disorder    Abnormal glucose level    Backache    Burping    Iron deficiency    Tinnitus of both ears    Glaucoma suspect    Indigestion    Iritis of left eye    Altered bowel function    Myopia    Open-angle glaucoma suspect of both eyes at high risk  for progression    Trichiasis of left upper eyelid      Past Medical History:   Diagnosis Date    Arthralgia of shoulder 10/7/2022    Asthma     Cardiac conduction disorder 10/7/2022    Depression     Environmental allergies     Esophageal stricture 09/15/2014    EGD done at [facility].    Gastric ulcer 09/15/2014    EGD done at [facility].    Generalized edema 2022    GERD (gastroesophageal reflux disease)     Left leg DVT (H)     Lupus (H)      Past Surgical History:   Procedure Laterality Date    ABDOMINOPLASTY      APPENDECTOMY       SECTION      ESOPHAGOSCOPY, GASTROSCOPY, DUODENOSCOPY (EGD), COMBINED  09/15/2014    Esophageal stricture and gastric ulceration.    EXCISION / CURETTAGE BONE CYST PHALANGES OF FOOT Left     HYSTERECTOMY TOTAL ABDOMINAL, BILATERAL SALPINGO-OOPHORECTOMY, COMBINED      for ovarian infection.    MANDIBLE SURGERY      lower jaw advancement. Left over numbness of tongue and gums.Totally 5 surgeries.    OVARIAN CYST SURGERY Right 1977    SINUS SURGERY Bilateral      Family History   Problem Relation Age of Onset    Hypertension Mother     Thyroid Disease Mother     Arthritis Mother     Breast Cancer Mother 85        breast cancer    Hypertension Father     Heart Disease Father         Valve replacement ,bypass    Arthritis Father     Breast Cancer Maternal Grandmother         70s    Other - See Comments Maternal Grandmother         some sort blood clot.    Anxiety Disorder Brother     Obesity Brother     Rectal Cancer Brother     Depression Brother     Gout Son     Gout Son     Breast Cancer Paternal Aunt         40-50     Social History     Tobacco Use    Smoking status: Every Day     Current packs/day: 0.50     Average packs/day: 0.5 packs/day for 40.0 years (20.0 ttl pk-yrs)     Types: Cigarettes    Smokeless tobacco: Never   Substance Use Topics    Alcohol use: Yes     Comment: Occasional              The plan of care  was discussed with the patient. They understand and agree with the course of treatment prescribed. A printed summary was given including instructions and medications.  The use of Dragon/Checkd.In dictation services may have been used to construct the content in this note; any grammatical or spelling errors are non-intentional. Please contact the author of this note directly if you are in need of any clarification.

## 2025-02-21 ENCOUNTER — TRANSFERRED RECORDS (OUTPATIENT)
Dept: HEALTH INFORMATION MANAGEMENT | Facility: CLINIC | Age: 65
End: 2025-02-21
Payer: MEDICARE

## 2025-03-08 DIAGNOSIS — F41.9 ANXIETY: ICD-10-CM

## 2025-03-09 RX ORDER — DIAZEPAM 10 MG/1
TABLET ORAL
Qty: 15 TABLET | Refills: 0 | Status: SHIPPED | OUTPATIENT
Start: 2025-03-09

## 2025-04-16 DIAGNOSIS — F41.9 ANXIETY: ICD-10-CM

## 2025-04-16 RX ORDER — DIAZEPAM 10 MG/1
TABLET ORAL
Qty: 15 TABLET | Refills: 0 | Status: SHIPPED | OUTPATIENT
Start: 2025-04-16

## 2025-05-29 ENCOUNTER — TRANSFERRED RECORDS (OUTPATIENT)
Dept: ADMINISTRATIVE | Facility: CLINIC | Age: 65
End: 2025-05-29
Payer: MEDICARE

## 2025-05-30 NOTE — PROGRESS NOTES
2025        Trenton Milian   [address] [place] [address]      Trneton Milian,  :  1960    I am writing to let you know the results of the tests that were done the other day.   Thank you for allowing [facility] the opportunity to take part in your healthcare.  At [facility] we strive to provide each patient with the finest gastroenterology care available.  We hope your experience was pleasant and informative.    Martha Chowdhury,    The following are the results from your Small Bowel PillCam:   - This was a complete examination, with adequate bowel preparation and PillCam seen successfully entering into the cecum.  - There was a likely AVM seen in the proximal small bowel. This was non-bleeding during the study.  - There was an erythematous spot just distal to the likely AVM, and another in the mid small bowel, neither or which are thought to be likely clinically significant sources of bleeding.    The AVM in the proximal small bowel could be contributing to your anemia. AVM is a benign vascular lesion which can bleed. We recommend a procedure called a push enteroscopy to treat the AVM. This is like an upper endoscopy that goes deeper into the small bowel.  I will place orders for this and we will work on getting this scheduled. Please let me know if you have any questions.    Ivania Ham    Electronically signed by:  Ivania RENAE 2025 02:24 PM  Document generated by:  Ivania RENAE  2025  If your provider ordered multiple tests; the results may not become available at the same time.  If multiple test results are received within 14 days of one another, you may receive a duplicate.  cc:  Pancho Mckeon MD  cc:  Ivania RENAE

## 2025-06-09 ENCOUNTER — TRANSFERRED RECORDS (OUTPATIENT)
Dept: HEALTH INFORMATION MANAGEMENT | Facility: CLINIC | Age: 65
End: 2025-06-09
Payer: MEDICARE

## 2025-06-20 SDOH — HEALTH STABILITY: PHYSICAL HEALTH: ON AVERAGE, HOW MANY MINUTES DO YOU ENGAGE IN EXERCISE AT THIS LEVEL?: 40 MIN

## 2025-06-20 SDOH — HEALTH STABILITY: PHYSICAL HEALTH: ON AVERAGE, HOW MANY DAYS PER WEEK DO YOU ENGAGE IN MODERATE TO STRENUOUS EXERCISE (LIKE A BRISK WALK)?: 5 DAYS

## 2025-06-20 ASSESSMENT — SOCIAL DETERMINANTS OF HEALTH (SDOH): HOW OFTEN DO YOU GET TOGETHER WITH FRIENDS OR RELATIVES?: MORE THAN THREE TIMES A WEEK

## 2025-06-21 ASSESSMENT — ASTHMA QUESTIONNAIRES
QUESTION_1 LAST FOUR WEEKS HOW MUCH OF THE TIME DID YOUR ASTHMA KEEP YOU FROM GETTING AS MUCH DONE AT WORK, SCHOOL OR AT HOME: NONE OF THE TIME
ACT_TOTALSCORE: 25
QUESTION_4 LAST FOUR WEEKS HOW OFTEN HAVE YOU USED YOUR RESCUE INHALER OR NEBULIZER MEDICATION (SUCH AS ALBUTEROL): NOT AT ALL
QUESTION_5 LAST FOUR WEEKS HOW WOULD YOU RATE YOUR ASTHMA CONTROL: COMPLETELY CONTROLLED
QUESTION_2 LAST FOUR WEEKS HOW OFTEN HAVE YOU HAD SHORTNESS OF BREATH: NOT AT ALL
QUESTION_3 LAST FOUR WEEKS HOW OFTEN DID YOUR ASTHMA SYMPTOMS (WHEEZING, COUGHING, SHORTNESS OF BREATH, CHEST TIGHTNESS OR PAIN) WAKE YOU UP AT NIGHT OR EARLIER THAN USUAL IN THE MORNING: NOT AT ALL

## 2025-06-24 ENCOUNTER — OFFICE VISIT (OUTPATIENT)
Dept: FAMILY MEDICINE | Facility: CLINIC | Age: 65
End: 2025-06-24
Payer: MEDICARE

## 2025-06-24 VITALS
WEIGHT: 142 LBS | OXYGEN SATURATION: 97 % | DIASTOLIC BLOOD PRESSURE: 72 MMHG | RESPIRATION RATE: 18 BRPM | HEIGHT: 63 IN | HEART RATE: 77 BPM | BODY MASS INDEX: 25.16 KG/M2 | TEMPERATURE: 98.6 F | SYSTOLIC BLOOD PRESSURE: 122 MMHG

## 2025-06-24 DIAGNOSIS — F90.0 ATTENTION DEFICIT HYPERACTIVITY DISORDER (ADHD), PREDOMINANTLY INATTENTIVE TYPE: ICD-10-CM

## 2025-06-24 DIAGNOSIS — G25.81 RESTLESS LEGS SYNDROME (RLS): ICD-10-CM

## 2025-06-24 DIAGNOSIS — E53.8 VITAMIN B12 DEFICIENCY (NON ANEMIC): ICD-10-CM

## 2025-06-24 DIAGNOSIS — E61.1 IRON DEFICIENCY: ICD-10-CM

## 2025-06-24 DIAGNOSIS — E78.5 DYSLIPIDEMIA: ICD-10-CM

## 2025-06-24 DIAGNOSIS — Z87.891 PERSONAL HISTORY OF TOBACCO USE, PRESENTING HAZARDS TO HEALTH: ICD-10-CM

## 2025-06-24 DIAGNOSIS — J45.20 MILD INTERMITTENT ASTHMA WITHOUT COMPLICATION: ICD-10-CM

## 2025-06-24 DIAGNOSIS — F41.9 ANXIETY: ICD-10-CM

## 2025-06-24 DIAGNOSIS — Z00.00 MEDICARE ANNUAL WELLNESS VISIT, SUBSEQUENT: Primary | ICD-10-CM

## 2025-06-24 DIAGNOSIS — D68.62 LUPUS ANTICOAGULANT DISORDER: ICD-10-CM

## 2025-06-24 DIAGNOSIS — E55.9 VITAMIN D DEFICIENCY: ICD-10-CM

## 2025-06-24 DIAGNOSIS — R73.03 PREDIABETES: ICD-10-CM

## 2025-06-24 PROBLEM — E83.19 OTHER DISORDERS OF IRON METABOLISM: Status: ACTIVE | Noted: 2025-05-07

## 2025-06-24 LAB
ALBUMIN SERPL BCG-MCNC: 4.3 G/DL (ref 3.5–5.2)
ALP SERPL-CCNC: 120 U/L (ref 40–150)
ALT SERPL W P-5'-P-CCNC: 26 U/L (ref 0–50)
ANION GAP SERPL CALCULATED.3IONS-SCNC: 11 MMOL/L (ref 7–15)
AST SERPL W P-5'-P-CCNC: 32 U/L (ref 0–45)
BILIRUB SERPL-MCNC: 0.2 MG/DL
BUN SERPL-MCNC: 12.6 MG/DL (ref 8–23)
CALCIUM SERPL-MCNC: 9.8 MG/DL (ref 8.8–10.4)
CHLORIDE SERPL-SCNC: 105 MMOL/L (ref 98–107)
CHOLEST SERPL-MCNC: 251 MG/DL
CREAT SERPL-MCNC: 0.82 MG/DL (ref 0.51–0.95)
EGFRCR SERPLBLD CKD-EPI 2021: 78 ML/MIN/1.73M2
ERYTHROCYTE [DISTWIDTH] IN BLOOD BY AUTOMATED COUNT: 13.2 % (ref 10–15)
EST. AVERAGE GLUCOSE BLD GHB EST-MCNC: 114 MG/DL
FASTING STATUS PATIENT QL REPORTED: ABNORMAL
FASTING STATUS PATIENT QL REPORTED: NORMAL
FERRITIN SERPL-MCNC: 40 NG/ML (ref 11–328)
GLUCOSE SERPL-MCNC: 93 MG/DL (ref 70–99)
HBA1C MFR BLD: 5.6 % (ref 0–5.6)
HCO3 SERPL-SCNC: 25 MMOL/L (ref 22–29)
HCT VFR BLD AUTO: 45.8 % (ref 35–47)
HDLC SERPL-MCNC: 55 MG/DL
HGB BLD-MCNC: 15 G/DL (ref 11.7–15.7)
IRON BINDING CAPACITY (ROCHE): 313 UG/DL (ref 240–430)
IRON SATN MFR SERPL: 22 % (ref 15–46)
IRON SERPL-MCNC: 69 UG/DL (ref 37–145)
LDLC SERPL CALC-MCNC: 169 MG/DL
MCH RBC QN AUTO: 33.4 PG (ref 26.5–33)
MCHC RBC AUTO-ENTMCNC: 32.8 G/DL (ref 31.5–36.5)
MCV RBC AUTO: 102 FL (ref 78–100)
NONHDLC SERPL-MCNC: 196 MG/DL
PLATELET # BLD AUTO: 226 10E3/UL (ref 150–450)
POTASSIUM SERPL-SCNC: 5 MMOL/L (ref 3.4–5.3)
PROT SERPL-MCNC: 7.1 G/DL (ref 6.4–8.3)
RBC # BLD AUTO: 4.49 10E6/UL (ref 3.8–5.2)
SODIUM SERPL-SCNC: 141 MMOL/L (ref 135–145)
TRIGL SERPL-MCNC: 133 MG/DL
VIT B12 SERPL-MCNC: 692 PG/ML (ref 232–1245)
VIT D+METAB SERPL-MCNC: 30 NG/ML (ref 20–50)
WBC # BLD AUTO: 10.4 10E3/UL (ref 4–11)

## 2025-06-24 PROCEDURE — 1126F AMNT PAIN NOTED NONE PRSNT: CPT | Performed by: FAMILY MEDICINE

## 2025-06-24 PROCEDURE — 82728 ASSAY OF FERRITIN: CPT | Performed by: FAMILY MEDICINE

## 2025-06-24 PROCEDURE — 36415 COLL VENOUS BLD VENIPUNCTURE: CPT | Performed by: FAMILY MEDICINE

## 2025-06-24 PROCEDURE — 3074F SYST BP LT 130 MM HG: CPT | Performed by: FAMILY MEDICINE

## 2025-06-24 PROCEDURE — 80053 COMPREHEN METABOLIC PANEL: CPT | Performed by: FAMILY MEDICINE

## 2025-06-24 PROCEDURE — 99214 OFFICE O/P EST MOD 30 MIN: CPT | Mod: 25 | Performed by: FAMILY MEDICINE

## 2025-06-24 PROCEDURE — G0438 PPPS, INITIAL VISIT: HCPCS | Performed by: FAMILY MEDICINE

## 2025-06-24 PROCEDURE — 3044F HG A1C LEVEL LT 7.0%: CPT | Performed by: FAMILY MEDICINE

## 2025-06-24 PROCEDURE — 90480 ADMN SARSCOV2 VAC 1/ONLY CMP: CPT | Performed by: FAMILY MEDICINE

## 2025-06-24 PROCEDURE — 83036 HEMOGLOBIN GLYCOSYLATED A1C: CPT | Performed by: FAMILY MEDICINE

## 2025-06-24 PROCEDURE — 82607 VITAMIN B-12: CPT | Performed by: FAMILY MEDICINE

## 2025-06-24 PROCEDURE — 82306 VITAMIN D 25 HYDROXY: CPT | Performed by: FAMILY MEDICINE

## 2025-06-24 PROCEDURE — 80061 LIPID PANEL: CPT | Performed by: FAMILY MEDICINE

## 2025-06-24 PROCEDURE — 83550 IRON BINDING TEST: CPT | Performed by: FAMILY MEDICINE

## 2025-06-24 PROCEDURE — 3050F LDL-C >= 130 MG/DL: CPT | Performed by: FAMILY MEDICINE

## 2025-06-24 PROCEDURE — 91320 SARSCV2 VAC 30MCG TRS-SUC IM: CPT | Performed by: FAMILY MEDICINE

## 2025-06-24 PROCEDURE — 3078F DIAST BP <80 MM HG: CPT | Performed by: FAMILY MEDICINE

## 2025-06-24 PROCEDURE — 83540 ASSAY OF IRON: CPT | Performed by: FAMILY MEDICINE

## 2025-06-24 PROCEDURE — 85027 COMPLETE CBC AUTOMATED: CPT | Performed by: FAMILY MEDICINE

## 2025-06-24 RX ORDER — NEEDLES, SAFETY 25GX1"
1 NEEDLE, DISPOSABLE MISCELLANEOUS
Qty: 50 EACH | Refills: 1 | Status: SHIPPED | OUTPATIENT
Start: 2025-06-24

## 2025-06-24 RX ORDER — DEXTROAMPHETAMINE SACCHARATE, AMPHETAMINE ASPARTATE MONOHYDRATE, DEXTROAMPHETAMINE SULFATE AND AMPHETAMINE SULFATE 2.5; 2.5; 2.5; 2.5 MG/1; MG/1; MG/1; MG/1
10 CAPSULE, EXTENDED RELEASE ORAL DAILY
Qty: 30 CAPSULE | Refills: 0 | Status: SHIPPED | OUTPATIENT
Start: 2025-06-24

## 2025-06-24 RX ORDER — ELECTROLYTES/DEXTROSE
SOLUTION, ORAL ORAL
Qty: 100 EACH | Refills: 1 | Status: CANCELLED | OUTPATIENT
Start: 2025-06-24

## 2025-06-24 RX ORDER — RIVAROXABAN 20 MG/1
20 TABLET, FILM COATED ORAL DAILY
Qty: 90 TABLET | Refills: 3 | Status: SHIPPED | OUTPATIENT
Start: 2025-06-24

## 2025-06-24 ASSESSMENT — ACTIVITIES OF DAILY LIVING (ADL)
DOING_ERRANDS_INDEPENDENTLY_DIFFICULTY: NO
DIFFICULTY_COMMUNICATING: NO
DRESSING/BATHING_DIFFICULTY: NO
TOILETING_ISSUES: NO
FALL_HISTORY_WITHIN_LAST_SIX_MONTHS: NO
DIFFICULTY_EATING/SWALLOWING: NO
WEAR_GLASSES_OR_BLIND: YES
CHANGE_IN_FUNCTIONAL_STATUS_SINCE_ONSET_OF_CURRENT_ILLNESS/INJURY: NO
HEARING_DIFFICULTY_OR_DEAF: NO
WALKING_OR_CLIMBING_STAIRS_DIFFICULTY: NO
CONCENTRATING,_REMEMBERING_OR_MAKING_DECISIONS_DIFFICULTY: NO

## 2025-06-24 ASSESSMENT — PATIENT HEALTH QUESTIONNAIRE - PHQ9
10. IF YOU CHECKED OFF ANY PROBLEMS, HOW DIFFICULT HAVE THESE PROBLEMS MADE IT FOR YOU TO DO YOUR WORK, TAKE CARE OF THINGS AT HOME, OR GET ALONG WITH OTHER PEOPLE: SOMEWHAT DIFFICULT
SUM OF ALL RESPONSES TO PHQ QUESTIONS 1-9: 9
SUM OF ALL RESPONSES TO PHQ QUESTIONS 1-9: 9

## 2025-06-24 ASSESSMENT — PAIN SCALES - GENERAL: PAINLEVEL_OUTOF10: NO PAIN (0)

## 2025-06-24 NOTE — PROGRESS NOTES
Preventive Care Visit  [facility]  Pancho Mckeon MD, MD, Family Medicine  Jun 24, 2025  {Provider  Link to [facility] :[id number]}    Trenton was seen today for recheck medication, wellness visit and a.d.h.d.    Diagnoses and all orders for this visit:    Medicare annual wellness visit, initial    Lupus anticoagulant disorder  -     XARELTO ANTICOAGULANT 20 MG TABS tablet; Take 1 tablet (20 mg) by mouth daily.    Anxiety    Iron deficiency  -     Ferritin; Future  -     CBC with platelets; Future  -     Iron & Iron Binding Capacity; Future    Mild intermittent asthma without complication    Vitamin B12 deficiency (non anemic)  -     Vitamin B12; Future  -     CBC with platelets; Future    Restless legs syndrome (RLS)    Prediabetes  -     Hemoglobin A1c; Future    Dyslipidemia  -     Lipid panel reflex to direct LDL Fasting; Future  -     Comprehensive metabolic panel (BMP + Alb, Alk Phos, ALT, AST, Total. Bili, TP); Future    Vitamin D deficiency  -     Vitamin D Deficiency; Future    Attention deficit hyperactivity disorder (ADHD), predominantly inattentive type  -     amphetamine-dextroamphetamine (ADDERALL XR) 10 MG 24 hr capsule; Take 1 capsule (10 mg) by mouth daily.    Personal history of tobacco use, presenting hazards to health  -     CT Chest Lung Cancer Scrn Low Dose wo; Future    Other orders  -     COVID-19 12+ (PFIZER)       Subjective   Trenton is a 64 year old, presenting for the following:  Recheck Medication, Wellness Visit, and A.D.H.D        6/24/2025     8:56 AM   Additional Questions   Roomed by am cma      Do you have a current opioid prescription? No  Rx for Diazepam.  Do you use any other controlled substances or medications that are not prescribed by a provider?        Advance Care Planning  {The storyboard will display whether the patient has ACP docs on file. Hover over the Code section in the storyboard to access the ACP documents. :[id number]}  Document on file is a  Health Care Directive or POLST.        6/20/2025   General Health   How would you rate your overall physical health? Good   Feel stress (tense, anxious, or unable to sleep) Not at all         6/20/2025   Nutrition   Diet: Regular (no restrictions)         6/20/2025   Exercise   Days per week of moderate/strenous exercise 5 days   Average minutes spent exercising at this level 40 min         6/20/2025   Social Factors   Frequency of gathering with friends or relatives More than three times a week   Worry food won't last until get money to buy more No   Food not last or not have enough money for food? No   Do you have housing? (Housing is defined as stable permanent housing and does not include staying outside in a car, in a tent, in an abandoned building, in an overnight shelter, or couch-surfing.) No   Are you worried about losing your housing? No   Lack of transportation? No   Unable to get utilities (heat,electricity)? No   Want help with housing or utility concern? No   (!) HOUSING CONCERN PRESENT      6/24/2025   Fall Risk   Fallen 2 or more times in the past year? No   Trouble with walking or balance? No          6/20/2025   Activities of Daily Living- Home Safety   Needs help with the following daily activites None of the above   Safety concerns in the home None of the above         6/20/2025   Dental   Dentist two times every year? Yes         6/20/2025   Hearing Screening   Hearing concerns? (!) IT'S HARD TO FOLLOW A CONVERSATION IN A NOISY RESTAURANT OR CROWDED ROOM.   Would you like a referral for hearing testing? No         6/20/2025   Driving Risk Screening   Patient/family members have concerns about driving No         6/20/2025   General Alertness/Fatigue Screening   Have you been more tired than usual lately? (!) YES         6/20/2025   Urinary Incontinence Screening   Bothered by leaking urine in past 6 months No       Today's PHQ-9 Score:       6/24/2025     8:40 AM   PHQ-9 SCORE   PHQ-9 Total Score  MyChart 9 (Mild depression)   PHQ-9 Total Score 9        Patient-reported         6/20/2025   Substance Use   Alcohol more than 3/day or more than 7/wk No   Do you have a current opioid prescription? No   How severe/bad is pain from 1 to 10? 2/10   Do you use any other substances recreationally? No     Social History     Tobacco Use    Smoking status: Every Day     Current packs/day: 0.50     Average packs/day: 0.5 packs/day for 40.0 years (20.0 ttl pk-yrs)     Types: Cigarettes    Smokeless tobacco: Never   Vaping Use    Vaping status: Never Used   Substance Use Topics    Alcohol use: Yes     Comment: Occasional    Drug use: Never     {Provider  If there are gaps in the social history shown above, please follow the link to update and then refresh the note Link to Social and Substance History :[id number]}      8/21/2024   LAST FHS-7 RESULTS   1st degree relative breast or ovarian cancer Yes   Any relative bilateral breast cancer No   Any male have breast cancer No   Any ONE woman have BOTH breast AND ovarian cancer No   Any woman with breast cancer before 50yrs Yes   2 or more relatives with breast AND/OR ovarian cancer Yes   2 or more relatives with breast AND/OR bowel cancer Yes     {If any of the questions to the FHS7 are answered yes, consider referral for genetic counseling.    Additional indications for genetic referral include personal history of breast or ovarian cancer, genetic mutation in 1st degree relative which increases risk of breast cancer including BRCA1, BRCA2, DAVID, PALB 2, TP53, CHEK2, PTEN, CDH1, STK11 (per ACS) and/or 1st degree relative with history of pancreatic or high-risk prostate cancer (per NCCN):[id number]}     History of abnormal Pap smear:        ASCVD Risk   The 10-year ASCVD risk score (Fabiano NAVA, et al., 2019) is: 11.7%    Values used to calculate the score:      Age: 64 years      Sex: Female      Is Non- : No      Diabetic: No      Tobacco smoker: Yes       Systolic Blood Pressure: 122 mmHg      Is BP treated: No      HDL Cholesterol: 47 mg/dL      Total Cholesterol: 243 mg/dL    {Provider  REQUIRED FOR AWV Use the storyboard to review patient history, after sections have been marked as reviewed, refresh note to capture documentation:[id number]}  {Provider   REQUIRED AWV use this link to review and update sexual activity history  after section has been marked as reviewed, refresh note to capture documentation:[id number]}  Reviewed and updated as needed this visit by Provider                  Current providers sharing in care for this patient include:  Patient Care Team:  Pancho Mckeon MD as PCP - General (Family Medicine)  Pancho Mckeon MD as Assigned PCP  Jing Alberto AuD as Audiologist (Audiology)    The following health maintenance items are reviewed in Epic and correct as of today:  Health Maintenance   Topic Date Due    DEPRESSION ACTION PLAN  Never done    RSV VACCINE (1 - Risk 60-74 years 1-dose series) Never done    PNEUMOCOCCAL VACCINE 50+ YEARS (2 of 2 - PCV) 09/15/2021    NICOTINE/TOBACCO CESSATION COUNSELING Q 1 YR  10/20/2022    ANNUAL REVIEW OF HM ORDERS  06/27/2023    ASTHMA ACTION PLAN  06/27/2023    COVID-19 VACCINE (5 - 2024-25 season) 09/01/2024    LIPID  06/21/2025    MEDICARE ANNUAL WELLNESS VISIT  06/21/2025    INFLUENZA VACCINE (Season Ended) 09/01/2025    ASTHMA CONTROL TEST  12/24/2025    PHQ-9  12/24/2025    FALL RISK ASSESSMENT  06/24/2026    MAMMO SCREENING  08/21/2026    DIABETES SCREENING  06/21/2027    ADVANCE CARE PLANNING  06/21/2029    DTAP/TDAP/TD VACCINE (3 - Td or Tdap) 11/18/2029    COLORECTAL CANCER SCREENING  10/29/2031    DEXA  07/10/2039    HEPATITIS C SCREENING  Completed    ZOSTER VACCINE  Completed    HPV VACCINE  Aged Out    MENINGITIS VACCINE  Aged Out    LUNG CANCER SCREENING  Discontinued     Complete review of systems is obtained.  Other than the specific considerations noted above complete review of systems is  "negative.     Objective    Exam  /72   Pulse 77   Temp 98.6  F (37  C)   Resp 18   Ht 1.6 m (5' 3\")   Wt 64.4 kg (142 lb)   LMP  (LMP Unknown)   SpO2 97%   BMI 25.15 kg/m     Estimated body mass index is 25.15 kg/m  as calculated from the following:    Height as of this encounter: 1.6 m (5' 3\").    Weight as of this encounter: 64.4 kg (142 lb).    Physical Exam    General Appearance:    Alert, cooperative, no distress   Eyes:   No scleral icterus or conjunctival irritation       Ears:    Normal TM's and external ear canals, both ears   Throat:   Lips, mucosa, and tongue normal; teeth and gums normal   Neck:   Supple, symmetrical, trachea midline, no adenopathy;        thyroid:  No enlargement/tenderness/nodules   Lungs:     Clear to auscultation bilaterally, respirations unlabored, no wheezes or crackles   Heart:    Regular rate and rhythm,  No murmur   Abdomen:    Soft, no distention, no tenderness on palpation, no masses, no organomegaly     Extremities:  No edema, no joint swelling or redness, no evidence of any injuries   Skin:  No concerning skin findings, no suspicious moles, no rashes   Neurologic:  On gross examination there is no motor or sensory deficit.  Patient walks with a normal gait             6/24/2025   Mini Cog   Clock Draw Score 2 Normal   3 Item Recall 3 objects recalled   Mini Cog Total Score 5     {A Mini-Cog total score of 0-2 suggests the possibility of dementia, score of 3-5 suggests no dementia:[id number]}        6/21/2024   Vision Screen   Reason Vision Screen Not Completed Patient had exam in last 12 months        Data saved with a previous flowsheet row definition       Signed Electronically by: Pancho Mckeon MD  {Email feedback regarding this note to [email or web address]   :[id number]}  Answers submitted by the patient for this visit:  Patient Health Questionnaire (Submitted on 6/24/2025)  If you checked off any problems, how difficult have these " problems made it for you to do your work, take care of things at home, or get along with other people?: Somewhat difficult  PHQ9 TOTAL SCORE: 9

## 2025-07-05 ENCOUNTER — HOSPITAL ENCOUNTER (OUTPATIENT)
Dept: CT IMAGING | Facility: HOSPITAL | Age: 65
Discharge: HOME | End: 2025-07-05
Attending: FAMILY MEDICINE | Admitting: FAMILY MEDICINE
Payer: MEDICARE

## 2025-07-05 DIAGNOSIS — Z87.891 PERSONAL HISTORY OF TOBACCO USE, PRESENTING HAZARDS TO HEALTH: ICD-10-CM

## 2025-07-05 PROCEDURE — 71271 CT THORAX LUNG CANCER SCR C-: CPT

## 2025-07-17 ENCOUNTER — MYC REFILL (OUTPATIENT)
Dept: FAMILY MEDICINE | Facility: CLINIC | Age: 65
End: 2025-07-17
Payer: MEDICARE

## 2025-07-17 DIAGNOSIS — F90.0 ATTENTION DEFICIT HYPERACTIVITY DISORDER (ADHD), PREDOMINANTLY INATTENTIVE TYPE: ICD-10-CM

## 2025-07-17 RX ORDER — DEXTROAMPHETAMINE SACCHARATE, AMPHETAMINE ASPARTATE MONOHYDRATE, DEXTROAMPHETAMINE SULFATE AND AMPHETAMINE SULFATE 2.5; 2.5; 2.5; 2.5 MG/1; MG/1; MG/1; MG/1
10 CAPSULE, EXTENDED RELEASE ORAL DAILY
Qty: 30 CAPSULE | Refills: 0 | Status: SHIPPED | OUTPATIENT
Start: 2025-07-17

## 2025-07-23 DIAGNOSIS — E53.8 VITAMIN B12 DEFICIENCY (NON ANEMIC): ICD-10-CM

## 2025-07-23 RX ORDER — CYANOCOBALAMIN 1000 UG/ML
INJECTION, SOLUTION INTRAMUSCULAR; SUBCUTANEOUS
Qty: 3 ML | Refills: 3 | Status: SHIPPED | OUTPATIENT
Start: 2025-07-23

## 2025-07-29 ENCOUNTER — MYC REFILL (OUTPATIENT)
Dept: FAMILY MEDICINE | Facility: CLINIC | Age: 65
End: 2025-07-29
Payer: MEDICARE

## 2025-07-29 DIAGNOSIS — F41.9 ANXIETY: ICD-10-CM

## 2025-07-29 DIAGNOSIS — Z91.09 ENVIRONMENTAL ALLERGIES: ICD-10-CM

## 2025-07-29 DIAGNOSIS — J45.20 MILD INTERMITTENT ASTHMA WITHOUT COMPLICATION: ICD-10-CM

## 2025-07-29 DIAGNOSIS — F32.1 CURRENT MODERATE EPISODE OF MAJOR DEPRESSIVE DISORDER WITHOUT PRIOR EPISODE (H): ICD-10-CM

## 2025-07-29 DIAGNOSIS — K29.50 CHRONIC GASTRITIS WITHOUT BLEEDING, UNSPECIFIED GASTRITIS TYPE: Primary | ICD-10-CM

## 2025-07-29 RX ORDER — DICYCLOMINE HCL 20 MG
20 TABLET ORAL 4 TIMES DAILY PRN
Qty: 120 TABLET | Refills: 1 | Status: SHIPPED | OUTPATIENT
Start: 2025-07-29

## 2025-07-29 RX ORDER — ALBUTEROL SULFATE 90 UG/1
2 AEROSOL, METERED RESPIRATORY (INHALATION) EVERY 4 HOURS PRN
Qty: 18 G | Refills: 3 | Status: SHIPPED | OUTPATIENT
Start: 2025-07-29

## 2025-07-29 RX ORDER — DIAZEPAM 10 MG/1
TABLET ORAL
Qty: 15 TABLET | Refills: 0 | Status: SHIPPED | OUTPATIENT
Start: 2025-07-29

## 2025-07-29 RX ORDER — DIAZEPAM 10 MG/1
TABLET ORAL
Qty: 15 TABLET | Refills: 0 | OUTPATIENT
Start: 2025-07-29

## 2025-07-29 RX ORDER — SERTRALINE HYDROCHLORIDE 25 MG/1
25 TABLET, FILM COATED ORAL DAILY
Qty: 90 TABLET | Refills: 3 | Status: SHIPPED | OUTPATIENT
Start: 2025-07-29

## 2025-07-29 RX ORDER — CETIRIZINE HYDROCHLORIDE 10 MG/1
10 TABLET ORAL DAILY
Qty: 90 TABLET | Refills: 3 | Status: SHIPPED | OUTPATIENT
Start: 2025-07-29

## 2025-07-29 RX ORDER — CETIRIZINE HYDROCHLORIDE 10 MG/1
10 TABLET ORAL DAILY
Qty: 90 TABLET | Refills: 3 | OUTPATIENT
Start: 2025-07-29

## 2025-07-29 NOTE — TELEPHONE ENCOUNTER
This refill request is a duplicate request, previously received or sent.  Sent denial notification to pharmacy.    SHANEKA MendezN, RN  [facility]

## 2025-08-14 PROBLEM — R63.5 WEIGHT GAIN: Status: RESOLVED | Noted: 2022-06-27 | Resolved: 2025-08-14

## 2025-08-26 ENCOUNTER — TELEPHONE (OUTPATIENT)
Dept: FAMILY MEDICINE | Facility: CLINIC | Age: 65
End: 2025-08-26